# Patient Record
Sex: MALE | Race: WHITE | NOT HISPANIC OR LATINO | Employment: OTHER | RURAL
[De-identification: names, ages, dates, MRNs, and addresses within clinical notes are randomized per-mention and may not be internally consistent; named-entity substitution may affect disease eponyms.]

---

## 2017-02-14 ENCOUNTER — TELEPHONE (OUTPATIENT)
Dept: CARDIOLOGY | Facility: CLINIC | Age: 77
End: 2017-02-14

## 2017-02-14 NOTE — TELEPHONE ENCOUNTER
Patient dr office called in to have a new patient appointment with  in the [place] office. Is it okay to schedule the patient in the hold spot for 3/23? Pt has a new onset of afib,SOA, & Right bbb.    Douglas CASON-PCP/DX:NEW ONSET AFIB;RIGHT BBB;SOA/[facility] PPO/SW CANDY

## 2017-02-23 ENCOUNTER — OFFICE VISIT (OUTPATIENT)
Dept: CARDIOLOGY | Facility: CLINIC | Age: 77
End: 2017-02-23

## 2017-02-23 VITALS
HEART RATE: 101 BPM | WEIGHT: 230 LBS | HEIGHT: 72 IN | SYSTOLIC BLOOD PRESSURE: 170 MMHG | DIASTOLIC BLOOD PRESSURE: 94 MMHG | BODY MASS INDEX: 31.15 KG/M2

## 2017-02-23 DIAGNOSIS — R06.09 DYSPNEA ON EXERTION: ICD-10-CM

## 2017-02-23 DIAGNOSIS — I20.9 ANGINA PECTORIS (HCC): ICD-10-CM

## 2017-02-23 DIAGNOSIS — I48.19 PERSISTENT ATRIAL FIBRILLATION (HCC): Primary | ICD-10-CM

## 2017-02-23 PROCEDURE — 93000 ELECTROCARDIOGRAM COMPLETE: CPT | Performed by: INTERNAL MEDICINE

## 2017-02-23 PROCEDURE — 99204 OFFICE O/P NEW MOD 45 MIN: CPT | Performed by: INTERNAL MEDICINE

## 2017-02-23 NOTE — PROGRESS NOTES
Subjective:     Encounter Date:02/23/2017      Patient ID: Nito Corado is a 76 y.o. male.    Chief Complaint: AF, dyspnea    History of Present Illness     Dear Dr. Be,      I had the pleasure of seeing the patient in cardiac evaluation today.  As you well know, he is a libia 76-year-old man with a history of prior smoking.  He quit in 1987.   He had a knee replacement in the past, but this is the first time he has seen a doctor in many years.    He presented to your office with complaints of dyspnea.   He was found to be in atrial fibrillation with a right bundle branch block.   He states that walking from the parking lot into the office, or just up one flight of stairs is enough to make him out of breath.  He also has a dry nonproductive cough.        Because of these symptoms, he has been sent to me for evaluation.          Review of Systems   All other systems reviewed and are negative.    Family History   Problem Relation Age of Onset   • Tuberculosis Father      Social History   Substance Use Topics   • Smoking status: Never Smoker   • Smokeless tobacco: None   • Alcohol use No           ECG 12 Lead  Date/Time: 2/23/2017 2:52 PM  Performed by: EDUARDO OSHEA  Authorized by: EDUARDO OSHEA   Previous ECG: no previous ECG available  Rhythm: atrial fibrillation  BPM: 101  Conduction: right bundle branch block and LAFB               Objective:     Physical Exam   Constitutional: He is oriented to person, place, and time. He appears well-developed and well-nourished.   HENT:   Head: Normocephalic and atraumatic.   Neck: Normal range of motion. Neck supple.   Cardiovascular: Normal rate and normal heart sounds.  An irregularly irregular rhythm present.   Pulmonary/Chest: Effort normal and breath sounds normal.   Abdominal: Soft. Bowel sounds are normal.   Musculoskeletal: Normal range of motion.   Neurological: He is alert and oriented to person, place, and time.   Skin: Skin is warm and dry.   Psychiatric: He  has a normal mood and affect. His behavior is normal. Thought content normal.   Vitals reviewed.      Lab Review:       Assessment:          Diagnosis Plan   1. Persistent atrial fibrillation     2. Dyspnea on exertion     3. Angina pectoris            Plan:        It was a pleasure to  see the patient in cardiac evaluation today.  He is a libia 76-year-old man with multiple cardiac risk factors.  He has not seen a physician in many years.   He now has atrial fibrillation of unclear duration. He has a conduction abnormality and he has a dry cough with severe dyspnea.       There are many possible causes of his symptoms.  I think the atrial fibrillation is a big clue, but it may not be the only possibility.       I have talked to him at length about anticoagulation for stroke prevention. He is not interested in warfarin due to the need to check his blood on a regular basis.   He will start Eliquis 5 mg twice daily.       He will start on Metoprolol 25 mg twice daily for rate control.  I will see him back in four weeks to see how he is tolerating this treatment.  Once he has been anticoagulated for a sufficient duration of time, I will consider cardioversion at that point.       He has significant symptoms that may represent other underlying heart disease.   He will get an echocardiogram, as well as a Lexiscan Nuclear Stress test to assess this further.  If no other symptoms or cardiac causes of his symptoms can be identified, then he may have to look at a pulmonary evaluation, as well.       Atrial Fibrillation and Atrial Flutter  Assessment  • The patient has atrial fibrillation-initial episode  • This is non-valvular in etiology  • The patient's CHADS2-VASc score is 1  • A [id number]-CZXu score of 1 is considered an intermediate risk for a thromboembolic event    Plan  • Attempt to maintain sinus rhythm  • Add apixaban for antithrombotic therapy  • Add beta blocker for rate control

## 2017-03-02 ENCOUNTER — TELEPHONE (OUTPATIENT)
Dept: CARDIOLOGY | Facility: CLINIC | Age: 77
End: 2017-03-02

## 2017-03-02 NOTE — TELEPHONE ENCOUNTER
Patient calling to get Stress Test & Echo scheduled at [facility].    Dr Ramirez please place order so this can be scheduled.    Thanks!

## 2017-03-03 DIAGNOSIS — I20.9 ANGINA PECTORIS (HCC): Primary | ICD-10-CM

## 2017-03-03 DIAGNOSIS — I48.19 PERSISTENT ATRIAL FIBRILLATION (HCC): ICD-10-CM

## 2017-03-03 DIAGNOSIS — R06.09 DYSPNEA ON EXERTION: ICD-10-CM

## 2017-03-03 NOTE — TELEPHONE ENCOUNTER
These were already ordered on 2/27/17.  [facility] says that I shouldn't order them a second time.

## 2017-03-06 NOTE — TELEPHONE ENCOUNTER
Su,   I scheduled these test last week for [facility]. He was scheduled for 3/3/17 for 8am and 9am.   Thanks,   [place]

## 2017-03-08 ENCOUNTER — TELEPHONE (OUTPATIENT)
Dept: CARDIOLOGY | Facility: CLINIC | Age: 77
End: 2017-03-08

## 2017-03-08 NOTE — TELEPHONE ENCOUNTER
Patient's wife calling for Echo & Stress Test results from [facility].  I have requested them to fax results and will place them in your INBOX for review.    Also, she wanted me to let you know that he is still not feeling well, nauseated and dizzy, and want to know if that could still be side effects from medication.    Please call her at [phone].    Thanks!

## 2017-03-10 NOTE — TELEPHONE ENCOUNTER
Patient's wife calling back for test results ( in your INBOX).  She is concerned that he has been really dizzy lately.    Please call her at [phone].    Thanks!

## 2017-03-23 ENCOUNTER — OFFICE VISIT (OUTPATIENT)
Dept: CARDIOLOGY | Facility: CLINIC | Age: 77
End: 2017-03-23

## 2017-03-23 VITALS
DIASTOLIC BLOOD PRESSURE: 96 MMHG | WEIGHT: 227 LBS | HEIGHT: 72 IN | BODY MASS INDEX: 30.75 KG/M2 | HEART RATE: 79 BPM | SYSTOLIC BLOOD PRESSURE: 158 MMHG

## 2017-03-23 DIAGNOSIS — I48.19 PERSISTENT ATRIAL FIBRILLATION (HCC): Primary | ICD-10-CM

## 2017-03-23 PROCEDURE — 93000 ELECTROCARDIOGRAM COMPLETE: CPT | Performed by: INTERNAL MEDICINE

## 2017-03-23 PROCEDURE — 99213 OFFICE O/P EST LOW 20 MIN: CPT | Performed by: INTERNAL MEDICINE

## 2017-03-23 NOTE — PROGRESS NOTES
Subjective:     Encounter Date:03/23/2017      Patient ID: Nito Corado is a 76 y.o. male.    Chief Complaint: Persistent AF    History of Present Illness     Dear Dr. Be,     I had the pleasure of seeing your patient in cardiac followup today.  As you well know, he is a libia 76-year-old man with history of persistent atrial fibrillation.  This was a new diagnosis for which he was seen one month ago.  I started him on Eliquis which he seems to tolerate well.  He has been rate controlled.  He has significant exertional dyspnea but it is not clear this is related to his atrial fibrillation.      He was in the emergency room for a headache associated with nausea.  His workup there was negative for any significant pathology.  He is concerned about possible side effects to medications.          Review of Systems   All other systems reviewed and are negative.        ECG 12 Lead  Date/Time: 3/23/2017 2:21 PM  Performed by: EDUARDO OSHEA  Authorized by: EDUARDO OSHEA   Comparison: compared with previous ECG   Similar to previous ECG  Rhythm: atrial fibrillation  BPM: 79  Conduction: right bundle branch block  QRS axis: left                 Objective:     Physical Exam   Constitutional: He is oriented to person, place, and time. He appears well-developed and well-nourished.   HENT:   Head: Normocephalic and atraumatic.   Neck: Normal range of motion. Neck supple.   Cardiovascular: Normal rate and normal heart sounds.  An irregularly irregular rhythm present.   Pulmonary/Chest: Effort normal and breath sounds normal.   Abdominal: Soft. Bowel sounds are normal.   Musculoskeletal: Normal range of motion.   Neurological: He is alert and oriented to person, place, and time.   Skin: Skin is warm and dry.   Psychiatric: He has a normal mood and affect. His behavior is normal. Thought content normal.   Vitals reviewed.      Lab Review:       Assessment:          Diagnosis Plan   1. Persistent atrial fibrillation   Cardioversion External in Cardiology Department          Plan:        It was a pleasure to see the patient in cardiac followup today. He is doing the same after anticoagulation for atrial fibrillation. He is fairly limited in that he cannot walk across the room without getting out of breath. On the other hand, he really does not want aggressive therapy as far as atrial fibrillation. I did think 1 attempt at cardioversion would be reasonable to see if we can get him back to a sinus rhythm. We will see if his symptoms improve with this.     I do not think he is interested in intensification of his medical regimen at all. He will schedule his cardioversion at his earliest convenience.     Atrial Fibrillation and Atrial Flutter  Assessment  • The patient has persistent atrial fibrillation  • This is non-valvular in etiology  • The patient's CHADS2-VASc score is 2  • A GVX3FL6-HBOo score of 2 or more is considered a high risk for a thromboembolic event  • Apixaban prescribed    Plan  • Attempt to maintain sinus rhythm  • Continue apixaban for antithrombotic therapy, bleeding issues discussed  • Continue beta blocker for rate control

## 2017-04-06 ENCOUNTER — OUTSIDE FACILITY SERVICE (OUTPATIENT)
Dept: CARDIOLOGY | Facility: CLINIC | Age: 77
End: 2017-04-06

## 2017-04-06 DIAGNOSIS — I48.19 PERSISTENT ATRIAL FIBRILLATION (HCC): ICD-10-CM

## 2017-04-06 PROCEDURE — 92960 CARDIOVERSION ELECTRIC EXT: CPT | Performed by: INTERNAL MEDICINE

## 2017-05-04 ENCOUNTER — OFFICE VISIT (OUTPATIENT)
Dept: CARDIOLOGY | Facility: CLINIC | Age: 77
End: 2017-05-04

## 2017-05-04 VITALS
DIASTOLIC BLOOD PRESSURE: 78 MMHG | BODY MASS INDEX: 30.75 KG/M2 | HEART RATE: 69 BPM | SYSTOLIC BLOOD PRESSURE: 142 MMHG | HEIGHT: 72 IN | WEIGHT: 227 LBS

## 2017-05-04 DIAGNOSIS — I48.0 AF (PAROXYSMAL ATRIAL FIBRILLATION) (HCC): Primary | ICD-10-CM

## 2017-05-04 PROCEDURE — 93000 ELECTROCARDIOGRAM COMPLETE: CPT | Performed by: INTERNAL MEDICINE

## 2017-05-04 PROCEDURE — 99213 OFFICE O/P EST LOW 20 MIN: CPT | Performed by: INTERNAL MEDICINE

## 2017-05-04 RX ORDER — PROPAFENONE HYDROCHLORIDE 300 MG/1
150 TABLET, COATED ORAL EVERY 8 HOURS
Qty: 60 TABLET | Refills: 11 | Status: SHIPPED | OUTPATIENT
Start: 2017-05-04 | End: 2017-06-29 | Stop reason: SDUPTHER

## 2017-05-08 ENCOUNTER — TELEPHONE (OUTPATIENT)
Dept: CARDIOLOGY | Facility: CLINIC | Age: 77
End: 2017-05-08

## 2017-06-02 ENCOUNTER — TELEPHONE (OUTPATIENT)
Dept: CARDIOLOGY | Facility: CLINIC | Age: 77
End: 2017-06-02

## 2017-06-05 ENCOUNTER — TELEPHONE (OUTPATIENT)
Dept: CARDIOLOGY | Facility: CLINIC | Age: 77
End: 2017-06-05

## 2017-06-05 NOTE — TELEPHONE ENCOUNTER
Per Dr. Ramirez, patient should increase propafenone to a full tab TID, and patient will call back to let us know how he is doing.  Possible EKG later this week.  Spoke to patient's wife.

## 2017-06-05 NOTE — TELEPHONE ENCOUNTER
Patient's wife says that patient is short of breath and they feel like he may be back in afib.  They say that he is having difficulty walking very short distances.  They want him to come in and see Dr. Ramirez sooner than the 6/29 appt.  Please call her back and let her know what you would like for them to do .

## 2017-06-15 ENCOUNTER — OFFICE VISIT (OUTPATIENT)
Dept: CARDIOLOGY | Facility: CLINIC | Age: 77
End: 2017-06-15

## 2017-06-15 VITALS
SYSTOLIC BLOOD PRESSURE: 142 MMHG | BODY MASS INDEX: 30.75 KG/M2 | HEART RATE: 78 BPM | WEIGHT: 227 LBS | HEIGHT: 72 IN | DIASTOLIC BLOOD PRESSURE: 76 MMHG

## 2017-06-15 DIAGNOSIS — I48.19 PERSISTENT ATRIAL FIBRILLATION (HCC): Primary | ICD-10-CM

## 2017-06-15 PROCEDURE — 99213 OFFICE O/P EST LOW 20 MIN: CPT | Performed by: INTERNAL MEDICINE

## 2017-06-15 PROCEDURE — 93000 ELECTROCARDIOGRAM COMPLETE: CPT | Performed by: INTERNAL MEDICINE

## 2017-06-15 NOTE — PROGRESS NOTES
Subjective:     Encounter Date:06/15/2017      Patient ID: Nito Corado is a 76 y.o. male.    Chief Complaint: PAF    History of Present Illness     Dear Dr. Be:    I had the pleasure of seeing the patient in cardiac followup today. As you well know, he is a libia 76-year-old man with history of paroxysmal atrial fibrillation. He has been treated with apixaban for stroke prevention, which he has tolerated well. His atrial fibrillation makes him tired. He complains of dyspnea, palpitations and fatigue.    He had a cardioversion in the past, but this was successful in restoring sinus rhythm only for 10 minutes. I then loaded him on propafenone and performed a repeat cardioversion. This time he held in sinus rhythm just for about a day.     Unfortunately, he did not have sinus rhythm long enough to know whether he felt significantly better in sinus rhythm.    In general, he reports his continued complaints of generalized fatigue, dyspnea and palpitations.         Review of Systems   All other systems reviewed and are negative.        ECG 12 Lead  Date/Time: 6/15/2017 4:23 PM  Performed by: EDUARDO OSHEA  Authorized by: EDUARDO OSHEA   Comparison: compared with previous ECG   Similar to previous ECG  Rhythm: atrial fibrillation  BPM: 78  Conduction: right bundle branch block               Objective:     Physical Exam   Constitutional: He is oriented to person, place, and time. He appears well-developed and well-nourished.   HENT:   Head: Normocephalic and atraumatic.   Neck: Normal range of motion. Neck supple.   Cardiovascular: Normal rate and normal heart sounds.  An irregularly irregular rhythm present.   Pulmonary/Chest: Effort normal and breath sounds normal.   Abdominal: Soft. Bowel sounds are normal.   Musculoskeletal: Normal range of motion.   Neurological: He is alert and oriented to person, place, and time.   Skin: Skin is warm and dry.   Psychiatric: He has a normal mood and affect. His behavior is  normal. Thought content normal.   Vitals reviewed.      Lab Review:       Assessment:          Diagnosis Plan   1. Persistent atrial fibrillation            Plan:        It was a pleasure to see the patient in cardiac followup today. He has had 2 cardioversions, one of which occurred on propafenone. He was unable to maintain sinus rhythm for more than a day. He still thinks that it might be worthwhile to do something more aggressive to treat his atrial fibrillation. To this end, I have recommended that he see Dr. Marti for EP evaluation. Further management will depend on the results of this consultation.    Atrial Fibrillation and Atrial Flutter  Assessment  • The patient has persistent atrial fibrillation  • This is non-valvular in etiology  • The patient's CHADS2-VASc score is 1  • A [id number]-JNSz score of 1 is considered an intermediate risk for a thromboembolic event  • Apixaban prescribed    Plan  • Attempt to maintain sinus rhythm  • Continue apixaban for antithrombotic therapy, bleeding issues discussed  • Continue propafenone for rhythm control  • Continue beta blocker for rate control    Subjective - Objective  • The patient underwent cardioversion

## 2017-06-29 RX ORDER — PROPAFENONE HYDROCHLORIDE 300 MG/1
300 TABLET, COATED ORAL EVERY 8 HOURS
Qty: 270 TABLET | Refills: 1 | Status: SHIPPED | OUTPATIENT
Start: 2017-06-29 | End: 2017-07-12

## 2017-07-12 ENCOUNTER — OFFICE VISIT (OUTPATIENT)
Dept: CARDIOLOGY | Facility: CLINIC | Age: 77
End: 2017-07-12

## 2017-07-12 VITALS
SYSTOLIC BLOOD PRESSURE: 172 MMHG | WEIGHT: 226 LBS | DIASTOLIC BLOOD PRESSURE: 102 MMHG | BODY MASS INDEX: 30.61 KG/M2 | HEIGHT: 72 IN | HEART RATE: 78 BPM

## 2017-07-12 DIAGNOSIS — I48.19 PERSISTENT ATRIAL FIBRILLATION (HCC): Primary | ICD-10-CM

## 2017-07-12 DIAGNOSIS — E66.3 OVERWEIGHT ON EXAMINATION: ICD-10-CM

## 2017-07-12 PROCEDURE — 93000 ELECTROCARDIOGRAM COMPLETE: CPT | Performed by: INTERNAL MEDICINE

## 2017-07-12 PROCEDURE — 99203 OFFICE O/P NEW LOW 30 MIN: CPT | Performed by: INTERNAL MEDICINE

## 2017-07-12 NOTE — PROGRESS NOTES
Date of Office Visit: 2017  Encounter Provider: Scooby Marti MD  Place of Service: [facility] CARDIOLOGY  Patient Name: Nito Corado  : 1940    Subjective:     Encounter Date:2017      Patient ID: Nito Corado is a 76 y.o. male who has a cc of  Persistent AF/     Cc is soa, pretty and fatigue and weakness.   Fatigue started two years ago.   This spring he started with soa.   Staggers a little bit.     No sig CAD   No anginal chest pain,   No sig pretty,   No soa,   No fainting,  No orthostasis.   No edema.   Exercise tolerance: pretty poor exercise tolerance.     There have been no hospital admission since the last visit.     There have been no bleeding events.       Past Medical History:   Diagnosis Date   • Allergic rhinitis    • Benign hypertension    • History of bleeding ulcers    • Marginal perforation of tympanic membrane    • Otorrhea    • PAF (paroxysmal atrial fibrillation)        Social History     Social History   • Marital status:      Spouse name: N/A   • Number of children: N/A   • Years of education: N/A     Occupational History   • Not on file.     Social History Main Topics   • Smoking status: Never Smoker   • Smokeless tobacco: Not on file   • Alcohol use No   • Drug use: Not on file   • Sexual activity: Not on file     Other Topics Concern   • Not on file     Social History Narrative       Review of Systems   Constitution: Negative for fever and night sweats.   HENT: Negative for ear pain and stridor.    Eyes: Negative for discharge and visual halos.   Cardiovascular: Negative for cyanosis.   Respiratory: Negative for hemoptysis and sputum production.    Hematologic/Lymphatic: Negative for adenopathy.   Skin: Negative for nail changes and unusual hair distribution.   Musculoskeletal: Negative for gout and joint swelling.   Gastrointestinal: Negative for bowel incontinence and flatus.   Genitourinary: Negative for dysuria and flank pain.  "  Neurological: Negative for seizures and tremors.   Psychiatric/Behavioral: Negative for altered mental status. The patient is not nervous/anxious.             Objective:     Vitals:    07/12/17 0937   BP: (!) 172/102   Pulse: 78   Weight: 226 lb (103 kg)   Height: 72\" (182.9 cm)         Physical Exam   Constitutional: He is oriented to person, place, and time.   HENT:   Head: Normocephalic and atraumatic.   Eyes: Right eye exhibits no discharge. Left eye exhibits no discharge.   Neck: No JVD present. No thyromegaly present.   Cardiovascular: Normal rate.  An irregularly irregular rhythm present. Exam reveals no gallop and no friction rub.    No murmur heard.  Pulmonary/Chest: Effort normal and breath sounds normal. He has no rales.   Abdominal: Soft. Bowel sounds are normal. There is no tenderness.   Musculoskeletal: Normal range of motion. He exhibits no edema or deformity.   Neurological: He is alert and oriented to person, place, and time. He exhibits normal muscle tone.   Skin: Skin is warm and dry. No erythema.   Psychiatric: He has a normal mood and affect. His behavior is normal. Thought content normal.         ECG 12 Lead  Date/Time: 7/12/2017 10:11 AM  Performed by: PENELOPE PABLO  Authorized by: PENELOPE PABLO   Comparison: compared with previous ECG   Similar to previous ECG  Rhythm: atrial fibrillation  Conduction: right bundle branch block  Clinical impression: abnormal ECG            Lab Review:       Assessment:          Diagnosis Plan   1. Persistent atrial fibrillation            Plan:       The CHADSVASC score is 2   Anticoagulation a-ban   Heart rate control bb      For the problem of overweight/obesity, we discussed the importance of lifestyle measures and strategies for weight loss, such as improved nutrition, regular exercise and sleep hygiene.     We should stop propafenone--and probably abandon rhythm control     We should get him a sleep eval -- highly likely that he has it    I told him " he needs to eat less and move more.

## 2017-07-31 ENCOUNTER — TELEPHONE (OUTPATIENT)
Dept: CARDIOLOGY | Facility: CLINIC | Age: 77
End: 2017-07-31

## 2017-08-14 NOTE — TELEPHONE ENCOUNTER
No neeraj not scheduled him to see anyone else. When we spoke about it, I mentioned to you that I was going to message JM and ask him for a referral for the patient to go see another doctor that will prescribe him a medicine, but you said you would ask him. I've not heard anything from you. I'll message him and see if he has any recommendations for the patient, because I don't know of any.    Dr. Marti,   Do you know of any doctors the patient can go to that he can be prescribed sleep meds?  trm

## 2017-08-16 DIAGNOSIS — G47.33 OBSTRUCTIVE SLEEP APNEA SYNDROME: Primary | ICD-10-CM

## 2017-08-16 DIAGNOSIS — I48.91 ATRIAL FIBRILLATION, UNSPECIFIED TYPE (HCC): ICD-10-CM

## 2017-10-17 ENCOUNTER — APPOINTMENT (OUTPATIENT)
Dept: SLEEP MEDICINE | Facility: HOSPITAL | Age: 77
End: 2017-10-17
Attending: INTERNAL MEDICINE

## 2017-11-27 ENCOUNTER — TELEPHONE (OUTPATIENT)
Dept: CARDIOLOGY | Facility: CLINIC | Age: 77
End: 2017-11-27

## 2017-11-27 NOTE — TELEPHONE ENCOUNTER
"Pt's wife called.  She states Mr. Corado has been using a CPAP machine for the past 5 wks and he is still SOA.  He is also in Afib.  They would like an appt with Dr. Marti to see what the \"next step\" would be.  Can you please help with an appt?  Or maybe he can see Marci?  Thanks/[facility]    # [phone]  "

## 2017-12-07 ENCOUNTER — APPOINTMENT (OUTPATIENT)
Dept: GENERAL RADIOLOGY | Facility: HOSPITAL | Age: 77
End: 2017-12-07
Attending: INTERNAL MEDICINE

## 2017-12-07 ENCOUNTER — HOSPITAL ENCOUNTER (OUTPATIENT)
Facility: HOSPITAL | Age: 77
Setting detail: OBSERVATION
Discharge: HOME | End: 2017-12-08
Attending: INTERNAL MEDICINE | Admitting: INTERNAL MEDICINE

## 2017-12-07 PROBLEM — D72.829 ELEVATED WBC COUNT: Status: ACTIVE | Noted: 2017-12-07

## 2017-12-07 PROBLEM — R06.02 SHORTNESS OF BREATH: Status: ACTIVE | Noted: 2017-12-07

## 2017-12-07 PROBLEM — R06.00 DYSPNEA: Status: ACTIVE | Noted: 2017-12-07

## 2017-12-07 PROBLEM — R10.9 ABDOMINAL PAIN: Status: ACTIVE | Noted: 2017-12-07

## 2017-12-07 PROBLEM — I48.91 ATRIAL FIBRILLATION (HCC): Status: ACTIVE | Noted: 2017-07-12

## 2017-12-07 PROBLEM — R11.0 NAUSEA: Status: ACTIVE | Noted: 2017-12-07

## 2017-12-07 LAB
ALBUMIN SERPL-MCNC: 3.6 G/DL (ref 3.5–5.2)
ALBUMIN/GLOB SERPL: 0.8 G/DL
ALP SERPL-CCNC: 59 U/L (ref 39–117)
ALT SERPL W P-5'-P-CCNC: 18 U/L (ref 1–41)
ANION GAP SERPL CALCULATED.3IONS-SCNC: 12.9 MMOL/L
AST SERPL-CCNC: 17 U/L (ref 1–40)
B PERT DNA SPEC QL NAA+PROBE: NOT DETECTED
BACTERIA UR QL AUTO: ABNORMAL /HPF
BASOPHILS # BLD AUTO: 0.01 10*3/MM3 (ref 0–0.2)
BASOPHILS NFR BLD AUTO: 0.1 % (ref 0–1.5)
BILIRUB SERPL-MCNC: 1.1 MG/DL (ref 0.1–1.2)
BILIRUB UR QL STRIP: NEGATIVE
BUN BLD-MCNC: 21 MG/DL (ref 8–23)
BUN/CREAT SERPL: 19.6 (ref 7–25)
C PNEUM DNA NPH QL NAA+NON-PROBE: NOT DETECTED
CALCIUM SPEC-SCNC: 9 MG/DL (ref 8.6–10.5)
CHLORIDE SERPL-SCNC: 101 MMOL/L (ref 98–107)
CLARITY UR: CLEAR
CO2 SERPL-SCNC: 23.1 MMOL/L (ref 22–29)
COLOR UR: ABNORMAL
CREAT BLD-MCNC: 1.07 MG/DL (ref 0.76–1.27)
D-LACTATE SERPL-SCNC: 1.2 MMOL/L (ref 0.5–2)
DEPRECATED RDW RBC AUTO: 45 FL (ref 37–54)
EOSINOPHIL # BLD AUTO: 0.01 10*3/MM3 (ref 0–0.7)
EOSINOPHIL NFR BLD AUTO: 0.1 % (ref 0.3–6.2)
ERYTHROCYTE [DISTWIDTH] IN BLOOD BY AUTOMATED COUNT: 13.2 % (ref 11.5–14.5)
FLUAV H1 2009 PAND RNA NPH QL NAA+PROBE: NOT DETECTED
FLUAV H1 HA GENE NPH QL NAA+PROBE: NOT DETECTED
FLUAV H3 RNA NPH QL NAA+PROBE: NOT DETECTED
FLUAV SUBTYP SPEC NAA+PROBE: NOT DETECTED
FLUBV RNA ISLT QL NAA+PROBE: NOT DETECTED
GFR SERPL CREATININE-BSD FRML MDRD: 67 ML/MIN/1.73
GLOBULIN UR ELPH-MCNC: 4.7 GM/DL
GLUCOSE BLD-MCNC: 137 MG/DL (ref 65–99)
GLUCOSE UR STRIP-MCNC: NEGATIVE MG/DL
HADV DNA SPEC NAA+PROBE: NOT DETECTED
HCOV 229E RNA SPEC QL NAA+PROBE: NOT DETECTED
HCOV HKU1 RNA SPEC QL NAA+PROBE: NOT DETECTED
HCOV NL63 RNA SPEC QL NAA+PROBE: NOT DETECTED
HCOV OC43 RNA SPEC QL NAA+PROBE: NOT DETECTED
HCT VFR BLD AUTO: 44.2 % (ref 40.4–52.2)
HGB BLD-MCNC: 14.4 G/DL (ref 13.7–17.6)
HGB UR QL STRIP.AUTO: ABNORMAL
HMPV RNA NPH QL NAA+NON-PROBE: NOT DETECTED
HPIV1 RNA SPEC QL NAA+PROBE: NOT DETECTED
HPIV2 RNA SPEC QL NAA+PROBE: NOT DETECTED
HPIV3 RNA NPH QL NAA+PROBE: NOT DETECTED
HPIV4 P GENE NPH QL NAA+PROBE: NOT DETECTED
HYALINE CASTS UR QL AUTO: ABNORMAL /LPF
IMM GRANULOCYTES # BLD: 0.05 10*3/MM3 (ref 0–0.03)
IMM GRANULOCYTES NFR BLD: 0.3 % (ref 0–0.5)
KETONES UR QL STRIP: NEGATIVE
LEUKOCYTE ESTERASE UR QL STRIP.AUTO: NEGATIVE
LYMPHOCYTES # BLD AUTO: 0.93 10*3/MM3 (ref 0.9–4.8)
LYMPHOCYTES NFR BLD AUTO: 5 % (ref 19.6–45.3)
M PNEUMO IGG SER IA-ACNC: NOT DETECTED
MCH RBC QN AUTO: 30.8 PG (ref 27–32.7)
MCHC RBC AUTO-ENTMCNC: 32.6 G/DL (ref 32.6–36.4)
MCV RBC AUTO: 94.4 FL (ref 79.8–96.2)
MONOCYTES # BLD AUTO: 1.69 10*3/MM3 (ref 0.2–1.2)
MONOCYTES NFR BLD AUTO: 9.1 % (ref 5–12)
NEUTROPHILS # BLD AUTO: 15.87 10*3/MM3 (ref 1.9–8.1)
NEUTROPHILS NFR BLD AUTO: 85.4 % (ref 42.7–76)
NITRITE UR QL STRIP: NEGATIVE
NT-PROBNP SERPL-MCNC: 2516 PG/ML (ref 0–1800)
PH UR STRIP.AUTO: <=5 [PH] (ref 5–8)
PLATELET # BLD AUTO: 176 10*3/MM3 (ref 140–500)
PMV BLD AUTO: 10.2 FL (ref 6–12)
POTASSIUM BLD-SCNC: 4.2 MMOL/L (ref 3.5–5.2)
PROCALCITONIN SERPL-MCNC: 0.21 NG/ML (ref 0.1–0.25)
PROT SERPL-MCNC: 8.3 G/DL (ref 6–8.5)
PROT UR QL STRIP: ABNORMAL
RBC # BLD AUTO: 4.68 10*6/MM3 (ref 4.6–6)
RBC # UR: ABNORMAL /HPF
REF LAB TEST METHOD: ABNORMAL
RHINOVIRUS RNA SPEC NAA+PROBE: NOT DETECTED
RSV RNA NPH QL NAA+NON-PROBE: NOT DETECTED
SODIUM BLD-SCNC: 137 MMOL/L (ref 136–145)
SP GR UR STRIP: >=1.03 (ref 1–1.03)
SQUAMOUS #/AREA URNS HPF: ABNORMAL /HPF
TROPONIN T SERPL-MCNC: <0.01 NG/ML (ref 0–0.03)
UROBILINOGEN UR QL STRIP: ABNORMAL
WBC NRBC COR # BLD: 18.56 10*3/MM3 (ref 4.5–10.7)
WBC UR QL AUTO: ABNORMAL /HPF

## 2017-12-07 PROCEDURE — 87633 RESP VIRUS 12-25 TARGETS: CPT | Performed by: HOSPITALIST

## 2017-12-07 PROCEDURE — G0378 HOSPITAL OBSERVATION PER HR: HCPCS

## 2017-12-07 PROCEDURE — 81001 URINALYSIS AUTO W/SCOPE: CPT | Performed by: HOSPITALIST

## 2017-12-07 PROCEDURE — 87798 DETECT AGENT NOS DNA AMP: CPT | Performed by: HOSPITALIST

## 2017-12-07 PROCEDURE — 71020 HC CHEST PA AND LATERAL: CPT

## 2017-12-07 PROCEDURE — 83880 ASSAY OF NATRIURETIC PEPTIDE: CPT | Performed by: NURSE PRACTITIONER

## 2017-12-07 PROCEDURE — 93010 ELECTROCARDIOGRAM REPORT: CPT | Performed by: INTERNAL MEDICINE

## 2017-12-07 PROCEDURE — 80053 COMPREHEN METABOLIC PANEL: CPT | Performed by: INTERNAL MEDICINE

## 2017-12-07 PROCEDURE — 87486 CHLMYD PNEUM DNA AMP PROBE: CPT | Performed by: HOSPITALIST

## 2017-12-07 PROCEDURE — G0379 DIRECT REFER HOSPITAL OBSERV: HCPCS

## 2017-12-07 PROCEDURE — 96374 THER/PROPH/DIAG INJ IV PUSH: CPT

## 2017-12-07 PROCEDURE — 25010000002 ONDANSETRON PER 1 MG: Performed by: NURSE PRACTITIONER

## 2017-12-07 PROCEDURE — 93005 ELECTROCARDIOGRAM TRACING: CPT | Performed by: INTERNAL MEDICINE

## 2017-12-07 PROCEDURE — 96375 TX/PRO/DX INJ NEW DRUG ADDON: CPT

## 2017-12-07 PROCEDURE — 87581 M.PNEUMON DNA AMP PROBE: CPT | Performed by: HOSPITALIST

## 2017-12-07 PROCEDURE — 99219 PR INITIAL OBSERVATION CARE/DAY 50 MINUTES: CPT | Performed by: INTERNAL MEDICINE

## 2017-12-07 PROCEDURE — 25010000002 FUROSEMIDE PER 20 MG: Performed by: INTERNAL MEDICINE

## 2017-12-07 PROCEDURE — 83605 ASSAY OF LACTIC ACID: CPT | Performed by: HOSPITALIST

## 2017-12-07 PROCEDURE — 84145 PROCALCITONIN (PCT): CPT | Performed by: HOSPITALIST

## 2017-12-07 PROCEDURE — 84484 ASSAY OF TROPONIN QUANT: CPT | Performed by: INTERNAL MEDICINE

## 2017-12-07 PROCEDURE — 85025 COMPLETE CBC W/AUTO DIFF WBC: CPT | Performed by: INTERNAL MEDICINE

## 2017-12-07 RX ORDER — FUROSEMIDE 10 MG/ML
60 INJECTION INTRAMUSCULAR; INTRAVENOUS ONCE
Status: COMPLETED | OUTPATIENT
Start: 2017-12-07 | End: 2017-12-07

## 2017-12-07 RX ORDER — POTASSIUM CHLORIDE 750 MG/1
40 CAPSULE, EXTENDED RELEASE ORAL ONCE
Status: COMPLETED | OUTPATIENT
Start: 2017-12-07 | End: 2017-12-07

## 2017-12-07 RX ORDER — ONDANSETRON 2 MG/ML
4 INJECTION INTRAMUSCULAR; INTRAVENOUS ONCE
Status: COMPLETED | OUTPATIENT
Start: 2017-12-07 | End: 2017-12-07

## 2017-12-07 RX ADMIN — FUROSEMIDE 60 MG: 10 INJECTION, SOLUTION INTRAMUSCULAR; INTRAVENOUS at 17:21

## 2017-12-07 RX ADMIN — METOPROLOL TARTRATE 25 MG: 25 TABLET ORAL at 20:39

## 2017-12-07 RX ADMIN — POTASSIUM CHLORIDE 40 MEQ: 750 CAPSULE, EXTENDED RELEASE ORAL at 17:21

## 2017-12-07 RX ADMIN — APIXABAN 5 MG: 2.5 TABLET, FILM COATED ORAL at 17:21

## 2017-12-07 RX ADMIN — METOPROLOL TARTRATE 25 MG: 25 TABLET ORAL at 12:17

## 2017-12-07 RX ADMIN — APIXABAN 5 MG: 2.5 TABLET, FILM COATED ORAL at 12:17

## 2017-12-07 RX ADMIN — ONDANSETRON 4 MG: 2 INJECTION INTRAMUSCULAR; INTRAVENOUS at 09:30

## 2017-12-07 NOTE — H&P
Electrophysiology History & Physical    Date of Hospital Visit: 2017  5:08 AM  Encounter Provider: Scooby Marti MD  Place of Service: [facility]  Patient Name: Nito Corado  :1940      Chief complaint: shortness of breath     [id number] Score: 2      History of Present Illness: Mr. Corado is a 77 year old patient of [facility] and Dr. Lee Ramirez that has a documented history of Paroxysmal  Afib (on apixaban) s/p DCCV, sleep apnea (on CPAP), HTN and obesity. His last stress test was in  at [facility] that showed small to moderate sized area of reversible ischemia involving the apex and inferior wall at the apex. His last echo was in  at [facility] with an EF of 50-55 % moderately dilated LA, moderate AR and mild TR.     He had last seen me in the office in July at which time he reported that he was short of breath and fatigued. He denied any chest pain, palpitations or syncope. At the time of this appointment he was in atrial fibrillation with a VR of 78 and /102. It was decided to stop his propafenone and abandon rhythm control.      He presented to [facility] ED with the complaint of cough, shortness of breath and palpitations that started a day or 2 ago but was worse yesterday morning. He of course was in afib but HR was elevated, 130.-150. This was accompanied with SOA, chest tightness that both became worse with exertion. He reports that he has had a cold over the last few days and has been feeling bloated and nauseated.  Chest x-ray was performed that showed mild vascular prominence without infiltrate. He was given 20 mg of IV Lasix and started on Cardizem drip. He has an elevated white count and received one dose of Rocephin. He is now off the Cardizem gtt and feel nauseated.         Past Medical History:   Diagnosis Date   • Allergic rhinitis    • Atrial fibrillation    • Benign hypertension    • History of bleeding ulcers    • Marginal perforation of  tympanic membrane    • Otorrhea    • PAF (paroxysmal atrial fibrillation)          Past Surgical History:   Procedure Laterality Date   • KNEE SURGERY         Family History   Problem Relation Age of Onset   • Tuberculosis Father        Prescriptions Prior to Admission   Medication Sig Dispense Refill Last Dose   • apixaban (ELIQUIS) 5 MG tablet tablet Take 1 tablet by mouth 2 (Two) Times a Day. 60 tablet 11 Taking   • metoprolol tartrate (LOPRESSOR) 25 MG tablet Take 1 tablet by mouth 2 (Two) Times a Day. 60 tablet 11 Taking   • triamcinolone (KENALOG) 0.1 % ointment Apply  topically 1 (One) Time As Needed.   Taking       Current Meds  No current facility-administered medications on file prior to encounter.      Current Outpatient Prescriptions on File Prior to Encounter   Medication Sig Dispense Refill   • apixaban (ELIQUIS) 5 MG tablet tablet Take 1 tablet by mouth 2 (Two) Times a Day. 60 tablet 11   • metoprolol tartrate (LOPRESSOR) 25 MG tablet Take 1 tablet by mouth 2 (Two) Times a Day. 60 tablet 11   • triamcinolone (KENALOG) 0.1 % ointment Apply  topically 1 (One) Time As Needed.           Social History     Social History   • Marital status:      Spouse name: N/A   • Number of children: N/A   • Years of education: N/A     Occupational History   • Not on file.     Social History Main Topics   • Smoking status: Former Smoker     Quit date: 12/7/1970   • Smokeless tobacco: Former User   • Alcohol use 0.6 oz/week     1 Shots of liquor per week   • Drug use: No   • Sexual activity: Defer     Other Topics Concern   • Not on file     Social History Narrative         REVIEW OF SYSTEMS: All systems reviewed. Pertinent positives identified in HPI. All other systems are negative.     12 point ROS was performed and is negative except as outlined in HPI           Objective:     Vitals:    12/07/17 0522 12/07/17 0552 12/07/17 0722 12/07/17 1136   BP: (!) 182/88 157/85 159/88 137/80   BP Location: Left arm  Left  "arm Left arm   Patient Position: Lying  Lying Lying   Pulse: 115 96 95 92   Resp: 21      Temp: 97.4 °F (36.3 °C)  98 °F (36.7 °C) 97.6 °F (36.4 °C)   TempSrc: Oral  Oral Oral   SpO2: 94% 94%  90%   Weight: 102 kg (224 lb 3.2 oz)      Height: 182.9 cm (72\")        Body mass index is 30.41 kg/(m^2).  Flowsheet Rows         First Filed Value    Admission Height  182.9 cm (72\") Documented at 12/07/2017 0522    Admission Weight  102 kg (224 lb 3.2 oz) Documented at 12/07/2017 0522                           Physical Exam:   Physical Exam   Constitutional: He is oriented to person, place, and time. He appears well-developed and well-nourished. No distress.   HENT:   Head: Normocephalic and atraumatic.   Eyes: Conjunctivae are normal. No scleral icterus.   Neck: Neck supple.   Cardiovascular: Normal rate.  An irregularly irregular rhythm present.   Irregularly, irregular   Pulmonary/Chest: Effort normal. No respiratory distress. He has rhonchi.   Abdominal: Soft.   Musculoskeletal: Normal range of motion. He exhibits no edema.   Neurological: He is alert and oriented to person, place, and time.   Skin: Skin is warm and dry.   Psychiatric: He has a normal mood and affect.                                                            Lab Review:        Results from last 7 days  Lab Units 12/07/17  0959   SODIUM mmol/L 137   POTASSIUM mmol/L 4.2   CHLORIDE mmol/L 101   CO2 mmol/L 23.1   BUN mg/dL 21   CREATININE mg/dL 1.07   GLUCOSE mg/dL 137*   CALCIUM mg/dL 9.0   AST (SGOT) U/L 17   ALT (SGPT) U/L 18    KDH:  WBC 21.1, hemoglobin 15, hematocrit 47, platelets 233  , K4.4, BUN 21, creatinine 1.10  Troponin 0.02, proBNP 3220    Results from last 7 days  Lab Units 12/07/17  0937   TROPONIN T ng/mL <0.010       Results from last 7 days  Lab Units 12/07/17  0937   WBC 10*3/mm3 18.56*   HEMOGLOBIN g/dL 14.4   HEMATOCRIT % 44.2   PLATELETS 10*3/mm3 176                                         Imaging:     Imaging Results (most " recent)     None            EKG:       I personally viewed and interpreted the patient's EKG/Telemetry data    Assessment:    Principal Problem:    Elevated WBC count  Active Problems:    Atrial fibrillation    Abdominal pain    Nausea    Dyspnea    Shortness of breath       Plan:       This is a patient of Dr. Ramirez.   I saw one time for consult for AF  See my note. I rec a trial of rate-control. Not a good candidate for rhythm control as he had not yet had MAXINE eval--among other reasons.   He then had an abrupt onset of weakness and cough and generalized weakness.,   He was transferred here from [place] and unbeknownst to me was admitted to me. Not sure why.   On exam today he has controlled af, no jvd, no edema and b/l rhonchi --   His wbc count is high and his symptoms are c/w with an infectious etiology.     His trop was negative. BNP was slightly high.     We will ask [facility] to admit, we will recheck a CXR, and bnp and repeat an echo.     He could have a mixed diastolic HF picture (previous echo shows normal LV function; nuclear also normal)           Scooby Marti MD  [facility]  12/07/17  1:35 PM

## 2017-12-07 NOTE — PLAN OF CARE
Problem: Patient Care Overview (Adult)  Goal: Plan of Care Review  Outcome: Ongoing (interventions implemented as appropriate)    12/07/17 0026   Coping/Psychosocial Response Interventions   Plan Of Care Reviewed With patient   Patient Care Overview   Progress progress towards functional goals is fair   Outcome Evaluation   Outcome Summary/Follow up Plan PT remains in Afib HR 90's. VSS. C/O nausea this am, tx with Zofran with positive results. [facility] consulted for cold/flu s/s. RT viral and flu swabs negative. UA sent. Had CXR. Got 1X dose of Lasix and KCL. WBC's remain elevated. Will continue to monitor.        Goal: Adult Individualization and Mutuality  Outcome: Ongoing (interventions implemented as appropriate)  Goal: Discharge Needs Assessment  Outcome: Ongoing (interventions implemented as appropriate)    Problem: Arrhythmia/Dysrhythmia (Symptomatic) (Adult)  Goal: Signs and Symptoms of Listed Potential Problems Will be Absent or Manageable (Arrhythmia/Dysrhythmia)  Outcome: Ongoing (interventions implemented as appropriate)    Problem: Pain, Acute (Adult)  Goal: Identify Related Risk Factors and Signs and Symptoms  Outcome: Ongoing (interventions implemented as appropriate)  Goal: Acceptable Pain Control/Comfort Level  Outcome: Ongoing (interventions implemented as appropriate)

## 2017-12-07 NOTE — CONSULTS
Consults    Patient Care Team:  No Known Provider as PCP - General    Chief complaint:soa    Subjective     History of Present Illness      Mr. Corado is a 77 year old patient who is a patient of Dr. Lee Ramirez and Dr Marti that has a history of Paroxysmal  Afib (on apixaban), sleep apnea (on CPAP), HTN and obesity. His last stress test was in 2015 at [facility] that showed small to moderate sized area of reversible ischemia involving the apex and inferior wall at the apex. Her last echo was in 2015 at [facility] with an EF of 50-55 %.        The patient presented to [facility] ED with the complaint of palpitations and was found to be in atrial fibrillation with a VR of 130-150. This was accompanied with SOA, chest tightness that both became worse with exertion. The patient was started on diltiazem for rate control and was transferred to [facility] for further eval.  Here at [facility] the patient's heart rate is better and he is off cardizem.    The patient also reports that he has had a cold over the last few days and has been feeling bloated and nauseated.   Chest x-ray was performed that showed mild vascular prominence without infiltrate, the patient was foud to have an elevated WBCof 21K.  He received one dose of Rocephin at the outside hospital. The patient this am continue to have nausea. The patient denies any abd pain.               Review of Systems   Constitutional: Negative for activity change and appetite change.   HENT: Positive for rhinorrhea. Negative for dental problem.    Respiratory: Negative for apnea and shortness of breath.    Cardiovascular: Negative for chest pain and palpitations.   Gastrointestinal: Positive for nausea. Negative for abdominal distention and abdominal pain.   Endocrine: Negative for cold intolerance and polydipsia.   Genitourinary: Negative for difficulty urinating and dysuria.   Musculoskeletal: Negative for arthralgias.   Skin: Negative for color change and pallor.   Neurological:  Negative for dizziness and numbness.   Hematological: Negative for adenopathy.   Psychiatric/Behavioral: Negative for agitation and behavioral problems.        Past Medical History:   Diagnosis Date   • Allergic rhinitis    • Atrial fibrillation    • Benign hypertension    • History of bleeding ulcers    • Marginal perforation of tympanic membrane    • Otorrhea    • PAF (paroxysmal atrial fibrillation)    ,   Past Surgical History:   Procedure Laterality Date   • KNEE SURGERY     ,   Family History   Problem Relation Age of Onset   • Tuberculosis Father    ,   Social History   Substance Use Topics   • Smoking status: Former Smoker     Quit date: 12/7/1970   • Smokeless tobacco: Former User   • Alcohol use 0.6 oz/week     1 Shots of liquor per week   ,   Prescriptions Prior to Admission   Medication Sig Dispense Refill Last Dose   • apixaban (ELIQUIS) 5 MG tablet tablet Take 1 tablet by mouth 2 (Two) Times a Day. 60 tablet 11 Taking   • metoprolol tartrate (LOPRESSOR) 25 MG tablet Take 1 tablet by mouth 2 (Two) Times a Day. 60 tablet 11 Taking   • triamcinolone (KENALOG) 0.1 % ointment Apply  topically 1 (One) Time As Needed.   Taking   , Scheduled Meds:    apixaban 5 mg Oral BID   metoprolol tartrate 25 mg Oral BID   ondansetron 4 mg Intravenous Once   , Continuous Infusions:   , PRN Meds:   and Allergies:  Review of patient's allergies indicates no known allergies.    Objective      Vital Signs  Temp:  [97.4 °F (36.3 °C)-98 °F (36.7 °C)] 98 °F (36.7 °C)  Heart Rate:  [] 95  Resp:  [21] 21  BP: (157-188)/(85-88) 159/88    Physical Exam   Constitutional: He appears well-developed and well-nourished. No distress.   HENT:   Head: Normocephalic and atraumatic.   Nose: Nose normal.   Mouth/Throat: Oropharynx is clear and moist. No oropharyngeal exudate.   Eyes: Conjunctivae and EOM are normal. No scleral icterus.   Neck: No JVD present. No tracheal deviation present. No thyromegaly present.   Cardiovascular: Normal  rate and normal heart sounds.  Exam reveals no gallop and no friction rub.    No murmur heard.  irregular   Pulmonary/Chest: Effort normal and breath sounds normal. No stridor. No respiratory distress. He has no wheezes. He has no rales.   Abdominal: Soft. Bowel sounds are normal. He exhibits no distension and no mass. Tenderness:  minimal. There is no rebound and no guarding.   abd is soft and non tender with particular attention to the RUQ   Musculoskeletal: He exhibits no edema, tenderness or deformity.   Lymphadenopathy:     He has no cervical adenopathy.   Neurological: He is alert. No cranial nerve deficit.   Skin: Skin is warm and dry. No rash noted. He is not diaphoretic.   Psychiatric: He has a normal mood and affect. His behavior is normal.       Results Review:    I reviewed the patient's new clinical results.  I personally viewed and interpreted the patient's EKG/Telemetry data      Assessment/Plan     Principal Problem:    Elevated WBC count  Active Problems:    Abdominal pain    Atrial fibrillation    Nausea    Dyspnea      Assessment & Plan      Elevated WBC count- check u/a, repeat cbc and cmp      Atrial fibrillation- the patient came in on cardizem but is now off of it      Nausea- better, he denies any abdominal pain- cmp to check on the LFT    Likely upper resp infection      Dyspnea- is resolved since the improvement of the HR    Discussed with Nerissa Perez    Addendum:  Labs reviewed 12/7 pm and unremarkable, wbc is still elevated    I discussed the patients findings and my recommendations with patient and family    Stewart Vizcarra MD  12/07/17  9:43 AM    Time:

## 2017-12-07 NOTE — PLAN OF CARE
Problem: Patient Care Overview (Adult)  Goal: Plan of Care Review  Outcome: Ongoing (interventions implemented as appropriate)    12/07/17 0550   Coping/Psychosocial Response Interventions   Plan Of Care Reviewed With patient   Patient Care Overview   Progress progress towards functional goals is fair   Outcome Evaluation   Outcome Summary/Follow up Plan pt is a direct admit from [facility] for a fib will keep npo till seen vitals are stable admission assessment done awaiting md to see pt this am

## 2017-12-08 ENCOUNTER — APPOINTMENT (OUTPATIENT)
Dept: CARDIOLOGY | Facility: HOSPITAL | Age: 77
End: 2017-12-08
Attending: INTERNAL MEDICINE

## 2017-12-08 VITALS
WEIGHT: 224.2 LBS | SYSTOLIC BLOOD PRESSURE: 125 MMHG | TEMPERATURE: 97.5 F | OXYGEN SATURATION: 95 % | BODY MASS INDEX: 30.37 KG/M2 | HEIGHT: 72 IN | HEART RATE: 87 BPM | RESPIRATION RATE: 18 BRPM | DIASTOLIC BLOOD PRESSURE: 90 MMHG

## 2017-12-08 LAB
ALBUMIN SERPL-MCNC: 3.8 G/DL (ref 3.5–5.2)
ALBUMIN/GLOB SERPL: 0.8 G/DL
ALP SERPL-CCNC: 64 U/L (ref 39–117)
ALT SERPL W P-5'-P-CCNC: 13 U/L (ref 1–41)
ANION GAP SERPL CALCULATED.3IONS-SCNC: 12.7 MMOL/L
AST SERPL-CCNC: 13 U/L (ref 1–40)
BASOPHILS # BLD AUTO: 0.02 10*3/MM3 (ref 0–0.2)
BASOPHILS NFR BLD AUTO: 0.2 % (ref 0–1.5)
BH CV ECHO MEAS - ACS: 1.8 CM
BH CV ECHO MEAS - AI DEC SLOPE: 304 CM/SEC^2
BH CV ECHO MEAS - AI MAX PG: 75 MMHG
BH CV ECHO MEAS - AI MAX VEL: 433 CM/SEC
BH CV ECHO MEAS - AI P1/2T: 417.2 MSEC
BH CV ECHO MEAS - AO MEAN PG (FULL): 2 MMHG
BH CV ECHO MEAS - AO MEAN PG: 5 MMHG
BH CV ECHO MEAS - AO ROOT AREA (BSA CORRECTED): 1.3
BH CV ECHO MEAS - AO ROOT AREA: 6.6 CM^2
BH CV ECHO MEAS - AO ROOT DIAM: 2.9 CM
BH CV ECHO MEAS - AO V2 MAX: 149 CM/SEC
BH CV ECHO MEAS - AO V2 MEAN: 96 CM/SEC
BH CV ECHO MEAS - AO V2 VTI: 25.3 CM
BH CV ECHO MEAS - ASC AORTA: 3 CM
BH CV ECHO MEAS - AVA(I,A): 2.8 CM^2
BH CV ECHO MEAS - AVA(I,D): 2.8 CM^2
BH CV ECHO MEAS - BSA(HAYCOCK): 2.3 M^2
BH CV ECHO MEAS - BSA: 2.2 M^2
BH CV ECHO MEAS - BZI_BMI: 30.4 KILOGRAMS/M^2
BH CV ECHO MEAS - BZI_METRIC_HEIGHT: 182.9 CM
BH CV ECHO MEAS - BZI_METRIC_WEIGHT: 101.6 KG
BH CV ECHO MEAS - CONTRAST EF (2CH): 62.6 ML/M^2
BH CV ECHO MEAS - CONTRAST EF 4CH: 59.3 ML/M^2
BH CV ECHO MEAS - EDV(CUBED): 91.1 ML
BH CV ECHO MEAS - EDV(MOD-SP2): 123 ML
BH CV ECHO MEAS - EDV(MOD-SP4): 123 ML
BH CV ECHO MEAS - EDV(TEICH): 92.4 ML
BH CV ECHO MEAS - EF(CUBED): 75.9 %
BH CV ECHO MEAS - EF(MOD-SP2): 62.6 %
BH CV ECHO MEAS - EF(MOD-SP4): 59.3 %
BH CV ECHO MEAS - EF(TEICH): 68 %
BH CV ECHO MEAS - ESV(CUBED): 22 ML
BH CV ECHO MEAS - ESV(MOD-SP2): 46 ML
BH CV ECHO MEAS - ESV(MOD-SP4): 50 ML
BH CV ECHO MEAS - ESV(TEICH): 29.6 ML
BH CV ECHO MEAS - FS: 37.8 %
BH CV ECHO MEAS - IVS/LVPW: 1
BH CV ECHO MEAS - IVSD: 1.2 CM
BH CV ECHO MEAS - LAT PEAK E' VEL: 8 CM/SEC
BH CV ECHO MEAS - LV DIASTOLIC VOL/BSA (35-75): 55 ML/M^2
BH CV ECHO MEAS - LV MASS(C)D: 198.1 GRAMS
BH CV ECHO MEAS - LV MASS(C)DI: 88.6 GRAMS/M^2
BH CV ECHO MEAS - LV MEAN PG: 3 MMHG
BH CV ECHO MEAS - LV SYSTOLIC VOL/BSA (12-30): 22.4 ML/M^2
BH CV ECHO MEAS - LV V1 MAX: 130 CM/SEC
BH CV ECHO MEAS - LV V1 MEAN: 83.9 CM/SEC
BH CV ECHO MEAS - LV V1 VTI: 22.7 CM
BH CV ECHO MEAS - LVIDD: 4.5 CM
BH CV ECHO MEAS - LVIDS: 2.8 CM
BH CV ECHO MEAS - LVLD AP2: 7.4 CM
BH CV ECHO MEAS - LVLD AP4: 7.7 CM
BH CV ECHO MEAS - LVLS AP2: 6.2 CM
BH CV ECHO MEAS - LVLS AP4: 6.5 CM
BH CV ECHO MEAS - LVOT AREA (M): 3.1 CM^2
BH CV ECHO MEAS - LVOT AREA: 3.1 CM^2
BH CV ECHO MEAS - LVOT DIAM: 2 CM
BH CV ECHO MEAS - LVPWD: 1.2 CM
BH CV ECHO MEAS - MED PEAK E' VEL: 7 CM/SEC
BH CV ECHO MEAS - MV DEC SLOPE: 419 CM/SEC^2
BH CV ECHO MEAS - MV DEC TIME: 0.14 SEC
BH CV ECHO MEAS - MV E MAX VEL: 97.1 CM/SEC
BH CV ECHO MEAS - MV MEAN PG: 3 MMHG
BH CV ECHO MEAS - MV P1/2T MAX VEL: 113 CM/SEC
BH CV ECHO MEAS - MV P1/2T: 79 MSEC
BH CV ECHO MEAS - MV V2 MEAN: 75.2 CM/SEC
BH CV ECHO MEAS - MV V2 VTI: 30.4 CM
BH CV ECHO MEAS - MVA P1/2T LCG: 1.9 CM^2
BH CV ECHO MEAS - MVA(P1/2T): 2.8 CM^2
BH CV ECHO MEAS - MVA(VTI): 2.3 CM^2
BH CV ECHO MEAS - PA ACC SLOPE: 0 CM/SEC^2
BH CV ECHO MEAS - PA ACC TIME: 0.11 SEC
BH CV ECHO MEAS - PA MAX PG: 4.5 MMHG
BH CV ECHO MEAS - PA PR(ACCEL): 31.3 MMHG
BH CV ECHO MEAS - PA V2 MAX: 106 CM/SEC
BH CV ECHO MEAS - QP/QS: 1
BH CV ECHO MEAS - RAP SYSTOLE: 3 MMHG
BH CV ECHO MEAS - RV MEAN PG: 2 MMHG
BH CV ECHO MEAS - RV V1 MEAN: 64.7 CM/SEC
BH CV ECHO MEAS - RV V1 VTI: 17.6 CM
BH CV ECHO MEAS - RVOT AREA: 4.2 CM^2
BH CV ECHO MEAS - RVOT DIAM: 2.3 CM
BH CV ECHO MEAS - RVSP: 18.8 MMHG
BH CV ECHO MEAS - SI(AO): 74.8 ML/M^2
BH CV ECHO MEAS - SI(CUBED): 30.9 ML/M^2
BH CV ECHO MEAS - SI(LVOT): 31.9 ML/M^2
BH CV ECHO MEAS - SI(MOD-SP2): 34.4 ML/M^2
BH CV ECHO MEAS - SI(MOD-SP4): 32.7 ML/M^2
BH CV ECHO MEAS - SI(TEICH): 28.1 ML/M^2
BH CV ECHO MEAS - SV(AO): 167.1 ML
BH CV ECHO MEAS - SV(CUBED): 69.2 ML
BH CV ECHO MEAS - SV(LVOT): 71.3 ML
BH CV ECHO MEAS - SV(MOD-SP2): 77 ML
BH CV ECHO MEAS - SV(MOD-SP4): 73 ML
BH CV ECHO MEAS - SV(RVOT): 73.1 ML
BH CV ECHO MEAS - SV(TEICH): 62.9 ML
BH CV ECHO MEAS - TAPSE (>1.6): 3.4 CM2
BH CV ECHO MEAS - TR MAX VEL: 199 CM/SEC
BH CV VAS BP RIGHT ARM: NORMAL MMHG
BH CV XLRA - RV BASE: 3.4 CM
BH CV XLRA - TDI S': 13 CM/SEC
BILIRUB SERPL-MCNC: 1 MG/DL (ref 0.1–1.2)
BUN BLD-MCNC: 33 MG/DL (ref 8–23)
BUN/CREAT SERPL: 23.6 (ref 7–25)
CALCIUM SPEC-SCNC: 9.1 MG/DL (ref 8.6–10.5)
CHLORIDE SERPL-SCNC: 101 MMOL/L (ref 98–107)
CO2 SERPL-SCNC: 23.3 MMOL/L (ref 22–29)
CREAT BLD-MCNC: 1.4 MG/DL (ref 0.76–1.27)
DEPRECATED RDW RBC AUTO: 45.1 FL (ref 37–54)
E/E' RATIO: 13
EOSINOPHIL # BLD AUTO: 0.13 10*3/MM3 (ref 0–0.7)
EOSINOPHIL NFR BLD AUTO: 1.1 % (ref 0.3–6.2)
ERYTHROCYTE [DISTWIDTH] IN BLOOD BY AUTOMATED COUNT: 13 % (ref 11.5–14.5)
GFR SERPL CREATININE-BSD FRML MDRD: 49 ML/MIN/1.73
GLOBULIN UR ELPH-MCNC: 4.9 GM/DL
GLUCOSE BLD-MCNC: 140 MG/DL (ref 65–99)
HCT VFR BLD AUTO: 46.2 % (ref 40.4–52.2)
HGB BLD-MCNC: 14.9 G/DL (ref 13.7–17.6)
IMM GRANULOCYTES # BLD: 0.03 10*3/MM3 (ref 0–0.03)
IMM GRANULOCYTES NFR BLD: 0.3 % (ref 0–0.5)
LEFT ATRIUM VOLUME INDEX: 33 ML/M2
LV EF 2D ECHO EST: 59 %
LYMPHOCYTES # BLD AUTO: 1.24 10*3/MM3 (ref 0.9–4.8)
LYMPHOCYTES NFR BLD AUTO: 10.4 % (ref 19.6–45.3)
MAXIMAL PREDICTED HEART RATE: 145 BPM
MCH RBC QN AUTO: 30.5 PG (ref 27–32.7)
MCHC RBC AUTO-ENTMCNC: 32.3 G/DL (ref 32.6–36.4)
MCV RBC AUTO: 94.5 FL (ref 79.8–96.2)
MONOCYTES # BLD AUTO: 1.16 10*3/MM3 (ref 0.2–1.2)
MONOCYTES NFR BLD AUTO: 9.8 % (ref 5–12)
NEUTROPHILS # BLD AUTO: 9.3 10*3/MM3 (ref 1.9–8.1)
NEUTROPHILS NFR BLD AUTO: 78.2 % (ref 42.7–76)
NRBC BLD MANUAL-RTO: 0 /100 WBC (ref 0–0)
PLATELET # BLD AUTO: 197 10*3/MM3 (ref 140–500)
PMV BLD AUTO: 10.8 FL (ref 6–12)
POTASSIUM BLD-SCNC: 3.9 MMOL/L (ref 3.5–5.2)
PROCALCITONIN SERPL-MCNC: 0.25 NG/ML (ref 0.1–0.25)
PROT SERPL-MCNC: 8.7 G/DL (ref 6–8.5)
RBC # BLD AUTO: 4.89 10*6/MM3 (ref 4.6–6)
SODIUM BLD-SCNC: 137 MMOL/L (ref 136–145)
STRESS TARGET HR: 123 BPM
WBC NRBC COR # BLD: 11.88 10*3/MM3 (ref 4.5–10.7)

## 2017-12-08 PROCEDURE — 93306 TTE W/DOPPLER COMPLETE: CPT | Performed by: INTERNAL MEDICINE

## 2017-12-08 PROCEDURE — 80053 COMPREHEN METABOLIC PANEL: CPT | Performed by: INTERNAL MEDICINE

## 2017-12-08 PROCEDURE — 25010000002 PERFLUTREN (DEFINITY) 8.476 MG IN SODIUM CHLORIDE 0.9 % 10 ML INJECTION: Performed by: INTERNAL MEDICINE

## 2017-12-08 PROCEDURE — 84145 PROCALCITONIN (PCT): CPT | Performed by: HOSPITALIST

## 2017-12-08 PROCEDURE — G0378 HOSPITAL OBSERVATION PER HR: HCPCS

## 2017-12-08 PROCEDURE — 85025 COMPLETE CBC W/AUTO DIFF WBC: CPT | Performed by: HOSPITALIST

## 2017-12-08 PROCEDURE — 99217 PR OBSERVATION CARE DISCHARGE MANAGEMENT: CPT | Performed by: INTERNAL MEDICINE

## 2017-12-08 PROCEDURE — 93306 TTE W/DOPPLER COMPLETE: CPT

## 2017-12-08 RX ADMIN — APIXABAN 5 MG: 2.5 TABLET, FILM COATED ORAL at 08:17

## 2017-12-08 RX ADMIN — PERFLUTREN 3 ML: 6.52 INJECTION, SUSPENSION INTRAVENOUS at 11:26

## 2017-12-08 RX ADMIN — METOPROLOL TARTRATE 25 MG: 25 TABLET ORAL at 04:58

## 2017-12-08 NOTE — PLAN OF CARE
Problem: Patient Care Overview (Adult)  Goal: Plan of Care Review  Outcome: Ongoing (interventions implemented as appropriate)  Goal: Adult Individualization and Mutuality  Outcome: Ongoing (interventions implemented as appropriate)  Goal: Discharge Needs Assessment  Outcome: Ongoing (interventions implemented as appropriate)    Problem: Arrhythmia/Dysrhythmia (Symptomatic) (Adult)  Goal: Signs and Symptoms of Listed Potential Problems Will be Absent or Manageable (Arrhythmia/Dysrhythmia)  Outcome: Ongoing (interventions implemented as appropriate)    Problem: Pain, Acute (Adult)  Goal: Identify Related Risk Factors and Signs and Symptoms  Outcome: Ongoing (interventions implemented as appropriate)  Goal: Acceptable Pain Control/Comfort Level  Outcome: Ongoing (interventions implemented as appropriate)

## 2017-12-08 NOTE — DISCHARGE SUMMARY
Nito Corado  [id number]    Date of Admit: 12/7/2017  Date of Discharge:  12/8/2017    Discharge Diagnosis:  Active Hospital Problems (** Indicates Principal Problem)    Diagnosis Date Noted   • **Elevated WBC count [D72.829] 12/07/2017   • Abdominal pain [R10.9] 12/07/2017   • Nausea [R11.0] 12/07/2017   • Dyspnea [R06.00] 12/07/2017   • Shortness of breath [R06.02] 12/07/2017   • Atrial fibrillation [I48.91] 07/12/2017      Resolved Hospital Problems    Diagnosis Date Noted Date Resolved   No resolved problems to display.           Hospital Course:     Mr Corado is a 77 year old patient of esme and Dr Marti with a documented history of PAF on apixaban s/p cardioversion, sleep apnea on CPAP, HTN, and obesity.  He presented to [facility] ED yesterday with complaints of palpitations and was found to be in atrial fibrillation with RVR and -150.  This was accompanied with SOA, chest tightness.  The patient was started on diltiazem drip and transferred here for further evaluation.  He also complained of having a cold over the last few day. His WBC was elevated to 21,000 and one dose of IV Rocephin was given there     Upon arrival, he was seen by Dr Marti.  His heart rate was down and Cardizem drip was stopped.  He recommended rate control not rhythm control.  ECHO was done that showed EF 59%, mild to moderate aortic regurgitation, RA dilated, and LA volume increased.  His HR has stayed down in the 80's, WBC have decreased from 18 to 11.8 creatinine is elevated from 1.0 to 1.4 after Lasix 80 mg given yesterday. He is feeling much better today and wants to go home.  He will follow up with me in 4 weeks and will need to watch his creatinine.     I suspect he had a viral illness with transient AF/RVR.  Now better.  OK to home.    Procedures Performed    ECHO  · Limited 2D imaging of cardiac valves  · Left ventricular systolic function is normal. Calculated EF = 59.3%. Estimated EF was in agreement with the  calculated EF. Estimated EF = 59%. Normal left ventricular cavity size noted. All left ventricular wall segments contract normally. Left ventricular wall thickness is consistent with mild concentric hypertrophy. Left ventricular diastolic function was indeterminate. Elevated left atrial pressure.  · Left atrial volume is mildly increased.  · Right atrial cavity size is mildly dilated.  · Mild to moderate aortic valve regurgitation is present.  · The tricuspid valve is grossly normal. Trace tricuspid valve regurgitation is present. Estimated right ventricular systolic pressure from tricuspid regurgitation is normal (<35 mmHg).           Consults     Date and Time Order Name Status Description    12/7/2017 0921 Inpatient Consult to Internal Medicine            Discharge Medications     Your medication list      CONTINUE taking these medications       Instructions Last Dose Given Next Dose Due    apixaban 5 MG tablet tablet   Commonly known as:  ELIQUIS        Take 1 tablet by mouth 2 (Two) Times a Day.         metoprolol tartrate 25 MG tablet   Commonly known as:  LOPRESSOR        Take 1 tablet by mouth 2 (Two) Times a Day.         triamcinolone 0.1 % ointment   Commonly known as:  KENALOG                    Discharge Diet:   Diet Instructions     **Follow a heart healthy diet.**           Healthy Heart Diet    Activity at Discharge:   Activity Instructions     *Gradually resume normal activity.**           As tolerated    Discharge disposition: Home    Condition on Discharge: Stable    Follow-up Appointments  Future Appointments  Date Time Provider Department Center   3/2/2018 11:20 AM Scooby Marti MD [id number] None     Additional Instructions for the Follow-ups that You Need to Schedule     Discharge Follow-up with Specified Provider: Dr. Ramirez; 1 Month    As directed    To:  Dr. Ramirez   Follow Up:  1 Month                 Test Results Pending at Discharge       Lee Ramirez MD  12/08/17  4:30  PM

## 2017-12-08 NOTE — PLAN OF CARE
Problem: Patient Care Overview (Adult)  Goal: Plan of Care Review  Outcome: Outcome(s) achieved Date Met:  12/08/17  Goal: Adult Individualization and Mutuality  Outcome: Outcome(s) achieved Date Met:  12/08/17  Goal: Discharge Needs Assessment  Outcome: Outcome(s) achieved Date Met:  12/08/17    Problem: Arrhythmia/Dysrhythmia (Symptomatic) (Adult)  Goal: Signs and Symptoms of Listed Potential Problems Will be Absent or Manageable (Arrhythmia/Dysrhythmia)  Outcome: Outcome(s) achieved Date Met:  12/08/17    Problem: Pain, Acute (Adult)  Goal: Identify Related Risk Factors and Signs and Symptoms  Outcome: Outcome(s) achieved Date Met:  12/08/17  Goal: Acceptable Pain Control/Comfort Level  Outcome: Ongoing (interventions implemented as appropriate)

## 2017-12-08 NOTE — PROGRESS NOTES
[facility]     LOS: 0 days     Subjective:  soa is much better  No chest pain  No abdominal pain  Eat good  Energy is much better    Objective:    Vital Signs:  Temp:  [97.6 °F (36.4 °C)-98 °F (36.7 °C)] 98 °F (36.7 °C)  Heart Rate:  [] 115  Resp:  [16-18] 18  BP: (107-151)/(66-85) 107/78    General Appearance: no acute distress, appears comfortable  Heart: regular rate and rhythm  Lungs: clear to auscultation bilaterally, respirations unlabored, good air entry  Abdomen: soft, non-tender, no guarding, no rebound, non-distended  Extremities: no edema, no cyanosis  Neurology: speech normal, CN grossly normal  Psychiatric: normal mood and affect    Results Review:    Glucose   Date Value Ref Range Status   12/08/2017 140 (H) 65 - 99 mg/dL Final   12/07/2017 137 (H) 65 - 99 mg/dL Final       Results from last 7 days  Lab Units 12/08/17  0401   WBC 10*3/mm3 11.88*   HEMOGLOBIN g/dL 14.9   HEMATOCRIT % 46.2   PLATELETS 10*3/mm3 197       Results from last 7 days  Lab Units 12/08/17  0401   SODIUM mmol/L 137   POTASSIUM mmol/L 3.9   CHLORIDE mmol/L 101   CO2 mmol/L 23.3   BUN mg/dL 33*   CREATININE mg/dL 1.40*   CALCIUM mg/dL 9.1   BILIRUBIN mg/dL 1.0   ALK PHOS U/L 64   ALT (SGPT) U/L 13   AST (SGOT) U/L 13   GLUCOSE mg/dL 140*               Results from last 7 days  Lab Units 12/07/17  0937   TROPONIN T ng/mL <0.010     Cultures:       I have reviewed daily medications and changes in CPOE    Scheduled meds    apixaban 5 mg Oral BID   metoprolol tartrate 25 mg Oral Q8H          PRN meds        Principal Problem:    Elevated WBC count  Active Problems:    Abdominal pain    Atrial fibrillation    Nausea    Dyspnea    Shortness of breath  creatinine elevation likely from the Iv lasix       Assessment/Plan:      Elevated WBC count is resolved     nausea but denies abdominal pain      Atrial fibrillation with rapid response    Nausea      Dyspnea      Shortness of breath  procal is up  slightly but wbc is much better and temperature curve is totally normal    Heart rate is noted to be elevated this am      Plan:  Cbc and bmp in one week    procal minimally higher but this is likely related to the slight worse kidney function.    Chest xray may now show pneumonia but patient is feeling much better and temp curve is totally normal and o2 sats 94%. WBC is now near normal. He did get a dose of antibiotics at [facility] per the family prior to coming to [facility].  Patient needs to do deep breathing exercised.  -f/u chest xray in 1 weeks or as recommended by PMD    Stewart Vizcarra MD  12/08/17  8:38 AM

## 2017-12-08 NOTE — PLAN OF CARE
Problem: Patient Care Overview (Adult)  Goal: Plan of Care Review  Outcome: Ongoing (interventions implemented as appropriate)    12/08/17 0506   Coping/Psychosocial Response Interventions   Plan Of Care Reviewed With patient   Patient Care Overview   Progress no change   Outcome Evaluation   Outcome Summary/Follow up Plan Pt remains in afib 80's-90's, denies chest pain/discomfort, palpatations or soa, VSS       Goal: Discharge Needs Assessment  Outcome: Ongoing (interventions implemented as appropriate)    Problem: Arrhythmia/Dysrhythmia (Symptomatic) (Adult)  Goal: Signs and Symptoms of Listed Potential Problems Will be Absent or Manageable (Arrhythmia/Dysrhythmia)  Outcome: Ongoing (interventions implemented as appropriate)    Problem: Pain, Acute (Adult)  Goal: Identify Related Risk Factors and Signs and Symptoms  Outcome: Ongoing (interventions implemented as appropriate)  Goal: Acceptable Pain Control/Comfort Level  Outcome: Ongoing (interventions implemented as appropriate)

## 2017-12-08 NOTE — DISCHARGE INSTR - LAB
**You will need to have a CMP, CBC and CXR with your Primary care doctor in one week. Call to set this up.**

## 2018-01-11 ENCOUNTER — OFFICE VISIT (OUTPATIENT)
Dept: CARDIOLOGY | Facility: CLINIC | Age: 78
End: 2018-01-11

## 2018-01-11 VITALS
HEIGHT: 72 IN | DIASTOLIC BLOOD PRESSURE: 70 MMHG | SYSTOLIC BLOOD PRESSURE: 132 MMHG | BODY MASS INDEX: 27.5 KG/M2 | WEIGHT: 203 LBS | HEART RATE: 82 BPM

## 2018-01-11 DIAGNOSIS — I48.20 CHRONIC ATRIAL FIBRILLATION (HCC): Primary | ICD-10-CM

## 2018-01-11 PROCEDURE — 93000 ELECTROCARDIOGRAM COMPLETE: CPT | Performed by: INTERNAL MEDICINE

## 2018-01-11 PROCEDURE — 99213 OFFICE O/P EST LOW 20 MIN: CPT | Performed by: INTERNAL MEDICINE

## 2018-02-05 NOTE — PROGRESS NOTES
Subjective:     Encounter Date:01/11/2018      Patient ID: Nito Corado is a 77 y.o. male.    Chief Complaint: CAF    History of Present Illness    Dear Dr. Be,     I had the pleasure of seeing your patient Nito Corado in cardiac followup today.  As you well know, he is a libia 77-year-old man with history of paroxysmal atrial fibrillation.  He is on apixaban for stroke prevention.  He has had cardioversion in the past which maintained sinus rhythm only for a short.  The propafenone did not help maintain sinus rhythm.      He was admitted to the hospital last month with some dyspnea and palpitations.  He was treated for atrial fibrillation with rapid response and did well.      Since his hospitalization, he continues to do well.  He has no complaints of angina or heart failure.  He remains in atrial fibrillation, but his rate is now well controlled.          Review of Systems   All other systems reviewed and are negative.        ECG 12 Lead  Date/Time: 1/11/2018 11:53 AM  Performed by: EDUARDO OSHEA  Authorized by: EDUARDO OSHEA   Comparison: compared with previous ECG   Similar to previous ECG  Rhythm: atrial fibrillation  BPM: 82                 Objective:     Physical Exam   Constitutional: He is oriented to person, place, and time. He appears well-developed and well-nourished.   HENT:   Head: Normocephalic and atraumatic.   Neck: Normal range of motion. Neck supple.   Cardiovascular: Normal rate and normal heart sounds.  An irregularly irregular rhythm present.   Pulmonary/Chest: Effort normal and breath sounds normal.   Abdominal: Soft. Bowel sounds are normal.   Musculoskeletal: Normal range of motion.   Neurological: He is alert and oriented to person, place, and time.   Skin: Skin is warm and dry.   Psychiatric: He has a normal mood and affect. His behavior is normal. Thought content normal.   Vitals reviewed.      Lab Review:       Assessment:          Diagnosis Plan   1. AF (paroxysmal atrial  fibrillation)            Plan:       It was a pleasure to see your patient in cardiac follow-up today.  He is doing very well from the cardiac standpoint.  His atrial fibrillation is well controlled on his current regimen.  He remains anticoagulated.      I have made no changes at this time.  He will see me again at his previously scheduled appointment in six months.      Atrial Fibrillation and Atrial Flutter  Assessment  • The patient has permanent atrial fibrillation  • This is non-valvular in etiology  • The patient's CHADS2-VASc score is 2  • A CDT1GV0-DPGl score of 2 or more is considered a high risk for a thromboembolic event  • Apixaban prescribed    Plan  • Continue in atrial fibrillation with rate control  • Continue apixaban for antithrombotic therapy, bleeding issues discussed  • Continue beta blocker for rate control    Subjective - Objective  • The patient underwent cardioversion

## 2018-04-02 RX ORDER — APIXABAN 5 MG/1
TABLET, FILM COATED ORAL
Qty: 60 TABLET | Refills: 11 | Status: SHIPPED | OUTPATIENT
Start: 2018-04-02 | End: 2019-02-14 | Stop reason: SDUPTHER

## 2018-07-12 ENCOUNTER — OFFICE VISIT (OUTPATIENT)
Dept: CARDIOLOGY | Facility: CLINIC | Age: 78
End: 2018-07-12

## 2018-07-12 VITALS
HEIGHT: 72 IN | SYSTOLIC BLOOD PRESSURE: 144 MMHG | HEART RATE: 76 BPM | WEIGHT: 232 LBS | BODY MASS INDEX: 31.42 KG/M2 | DIASTOLIC BLOOD PRESSURE: 82 MMHG

## 2018-07-12 DIAGNOSIS — G47.33 OBSTRUCTIVE SLEEP APNEA SYNDROME: ICD-10-CM

## 2018-07-12 DIAGNOSIS — R06.09 DYSPNEA ON EXERTION: ICD-10-CM

## 2018-07-12 DIAGNOSIS — I48.20 CHRONIC ATRIAL FIBRILLATION (HCC): Primary | ICD-10-CM

## 2018-07-12 PROCEDURE — 93000 ELECTROCARDIOGRAM COMPLETE: CPT | Performed by: INTERNAL MEDICINE

## 2018-07-12 PROCEDURE — 99213 OFFICE O/P EST LOW 20 MIN: CPT | Performed by: INTERNAL MEDICINE

## 2018-07-12 NOTE — PROGRESS NOTES
Subjective:     Encounter Date:07/12/2018      Patient ID: Nito Corado is a 77 y.o. male.    Chief Complaint: PAF, sleep apnea, dyspnea    History of Present Illness    He is a libia 77-year-old man with chronic atrial fibrillation. I have tried twice to cardiovert him, both of which only lasted a short period of time. Propafenone did not seem to help maintain sinus rhythm. The longest he was in normal rhythm was about 1 day. Over that time, he did not notice a difference in his symptoms.    He has been maintained on apixaban for stroke prevention and metoprolol for rate control.    He continues to suffer from chronic dyspnea. He thinks that it may be atrial fibrillation related. He saw Dr. Marti of the arrhythmia service who did not think ablation would be appropriate for him.        Review of Systems   All other systems reviewed and are negative.        ECG 12 Lead  Date/Time: 7/12/2018 11:22 AM  Performed by: EDUARDO OSHEA  Authorized by: EDUARDO OSHEA   Comparison: compared with previous ECG   Similar to previous ECG  Rhythm: atrial fibrillation  BPM: 76  Q waves: II, III and aVF                 Objective:     Physical Exam   Constitutional: He is oriented to person, place, and time. He appears well-developed and well-nourished.   HENT:   Head: Normocephalic and atraumatic.   Neck: Normal range of motion. Neck supple.   Cardiovascular: Normal rate and normal heart sounds.  An irregularly irregular rhythm present.   Pulmonary/Chest: Effort normal and breath sounds normal.   Abdominal: Soft. Bowel sounds are normal.   Musculoskeletal: Normal range of motion.   Neurological: He is alert and oriented to person, place, and time.   Skin: Skin is warm and dry.   Psychiatric: He has a normal mood and affect. His behavior is normal. Thought content normal.   Vitals reviewed.      Lab Review:       Assessment:          Diagnosis Plan   1. Chronic atrial fibrillation (CMS/HCC)     2. Obstructive sleep apnea syndrome      3. Dyspnea on exertion            Plan:       It was a pleasure to see your patient in cardiac followup today. He continues to suffer from chronic atrial fibrillation. He is well protected with apixaban for stroke prevention and metoprolol for rate control.    I do not think that further attempts at treating his atrial fibrillation will be fruitful. I have urged him to try to find other ways to help improve his dyspnea and fatigue. I think he needs to find a new primary care physician, as Dr. Be as retired. He will see me again in 6 months or sooner if symptoms warrant.    Atrial Fibrillation and Atrial Flutter  Assessment  • The patient has permanent atrial fibrillation  • This is non-valvular in etiology  • The patient's CHADS2-VASc score is 2  • A [id number]-VGDp score of 2 or more is considered a high risk for a thromboembolic event  • Apixaban prescribed    Plan  • Continue in atrial fibrillation with rate control  • Continue apixaban for antithrombotic therapy, bleeding issues discussed  • Continue beta blocker for rate control    Subjective - Objective  • The patient underwent cardioversion

## 2018-09-20 ENCOUNTER — TELEPHONE (OUTPATIENT)
Dept: CARDIOLOGY | Facility: CLINIC | Age: 78
End: 2018-09-20

## 2019-01-10 ENCOUNTER — OFFICE VISIT (OUTPATIENT)
Dept: CARDIOLOGY | Facility: CLINIC | Age: 79
End: 2019-01-10

## 2019-01-10 VITALS
WEIGHT: 235 LBS | HEART RATE: 69 BPM | SYSTOLIC BLOOD PRESSURE: 122 MMHG | HEIGHT: 72 IN | DIASTOLIC BLOOD PRESSURE: 62 MMHG | BODY MASS INDEX: 31.83 KG/M2

## 2019-01-10 DIAGNOSIS — R06.09 DYSPNEA ON EXERTION: ICD-10-CM

## 2019-01-10 DIAGNOSIS — I48.20 CHRONIC ATRIAL FIBRILLATION (HCC): Primary | ICD-10-CM

## 2019-01-10 PROCEDURE — 99213 OFFICE O/P EST LOW 20 MIN: CPT | Performed by: INTERNAL MEDICINE

## 2019-01-10 PROCEDURE — 93000 ELECTROCARDIOGRAM COMPLETE: CPT | Performed by: INTERNAL MEDICINE

## 2019-01-10 RX ORDER — BUDESONIDE AND FORMOTEROL FUMARATE DIHYDRATE 80; 4.5 UG/1; UG/1
2 AEROSOL RESPIRATORY (INHALATION)
COMMUNITY
End: 2020-07-17

## 2019-01-10 NOTE — PROGRESS NOTES
Subjective:     Encounter Date:01/10/2019      Patient ID: Nito Corado is a 78 y.o. male.    Chief Complaint: CAF, dyspnea    History of Present Illness    Dear Dr. Weaver:    I had the pleasure of seeing your patient in cardiac followup today. As you well know, he is a libia 78-year-old man with history of chronic atrial fibrillation. We tried cardioverting him twice and he only lasted in sinus rhythm a short time. When he was in sinus rhythm, he did not have any change in his symptoms. He tried propafenone which did not help maintain in sinus rhythm.    He remains on apixaban for stroke prevention. He is on metoprolol for rate control. He saw Dr. Marti for possible AFib ablation, but he was advised to just stay on rate control and anticoagulation.    The patient continues to complain of generalized fatigue. He does not do much exercise. He says that if he can hold onto something like a shopping cart, he can actually walk quite a bit.        Review of Systems   All other systems reviewed and are negative.        ECG 12 Lead  Date/Time: 1/10/2019 11:55 AM  Performed by: Lee Ramirez MD  Authorized by: Lee Ramirez MD   Comparison: compared with previous ECG   Similar to previous ECG  Rhythm: atrial fibrillation  BPM: 69  Conduction: LAFB               Objective:     Physical Exam   Constitutional: He is oriented to person, place, and time. He appears well-developed and well-nourished.   HENT:   Head: Normocephalic and atraumatic.   Neck: Normal range of motion. Neck supple.   Cardiovascular: Normal rate and normal heart sounds. An irregularly irregular rhythm present.   Pulmonary/Chest: Effort normal and breath sounds normal.   Abdominal: Soft. Bowel sounds are normal.   Musculoskeletal: Normal range of motion.   Neurological: He is alert and oriented to person, place, and time.   Skin: Skin is warm and dry.   Psychiatric: He has a normal mood and affect. His behavior is normal. Thought content normal.    Vitals reviewed.      Lab Review:       Assessment:          Diagnosis Plan   1. Chronic atrial fibrillation (CMS/HCC)     2. Dyspnea on exertion            Plan:       It was a pleasure to see your patient in cardiac followup today. He is stable from the cardiac standpoint. His symptoms limit his activity. I do not see any major cardiac cause for this. I think he would benefit from increasing his physical activity. He states that you have recommended an exercise program, which I strongly support. He will see me again in 6 months or sooner if symptoms warrant.        Atrial Fibrillation and Atrial Flutter  Assessment  • The patient has permanent atrial fibrillation  • This is non-valvular in etiology  • The patient's CHADS2-VASc score is 2  • A [id number] score of 2 or more is considered a high risk for a thromboembolic event  • Apixaban prescribed    Plan  • Continue in atrial fibrillation with rate control  • Continue apixaban for antithrombotic therapy, bleeding issues discussed  • Continue beta blocker for rate control    Subjective - Objective  • The patient underwent cardioversion

## 2019-05-02 ENCOUNTER — OFFICE VISIT (OUTPATIENT)
Dept: CARDIOLOGY | Facility: CLINIC | Age: 79
End: 2019-05-02

## 2019-05-02 VITALS
HEIGHT: 72 IN | DIASTOLIC BLOOD PRESSURE: 110 MMHG | WEIGHT: 231 LBS | SYSTOLIC BLOOD PRESSURE: 196 MMHG | BODY MASS INDEX: 31.29 KG/M2 | HEART RATE: 72 BPM

## 2019-05-02 DIAGNOSIS — I48.20 CHRONIC ATRIAL FIBRILLATION (HCC): Primary | ICD-10-CM

## 2019-05-02 PROCEDURE — 99213 OFFICE O/P EST LOW 20 MIN: CPT | Performed by: INTERNAL MEDICINE

## 2019-05-02 PROCEDURE — 93000 ELECTROCARDIOGRAM COMPLETE: CPT | Performed by: INTERNAL MEDICINE

## 2019-05-02 NOTE — PROGRESS NOTES
Subjective:     Encounter Date:05/02/2019      Patient ID: Nito Corado is a 78 y.o. male.    Chief Complaint: CAF    History of Present Illness    Dear Dr. Be,     I had the pleasure of seeing your patient in cardiac followup today. As you well know, he is a libia 78-year-old man with history of chronic atrial fibrillation. He has had cardioversion twice and maintained sinus rhythm only for a short period of time. When he did so he had no change in his symptoms.     He tried propafenone which did not help maintain sinus rhythm. He saw Dr. Marti for possible Afib ablation but in the end decided to be treated medically.    Since I have last seen him he reports doing well. He has no complaints of angina, dyspnea, or palpitations. He remains in Afib and is anticoagulated with apixaban.     Recently he has been suffering from allergies. He was placed on several medications including steroids. I think this is the cause of his hypertension today.         Review of Systems   All other systems reviewed and are negative.        ECG 12 Lead  Date/Time: 5/2/2019 11:46 AM  Performed by: Lee Ramirez MD  Authorized by: Lee Ramirez MD   Comparison: compared with previous ECG   Similar to previous ECG  Rhythm: atrial fibrillation  BPM: 72                 Objective:     Physical Exam   Constitutional: He is oriented to person, place, and time. He appears well-developed and well-nourished.   HENT:   Head: Normocephalic and atraumatic.   Neck: Normal range of motion. Neck supple.   Cardiovascular: Normal rate and normal heart sounds. An irregularly irregular rhythm present.   Pulmonary/Chest: Effort normal and breath sounds normal.   Abdominal: Soft. Bowel sounds are normal.   Musculoskeletal: Normal range of motion.   Neurological: He is alert and oriented to person, place, and time.   Skin: Skin is warm and dry.   Psychiatric: He has a normal mood and affect. His behavior is normal. Thought content normal.   Vitals  reviewed.      Lab Review:       Assessment:          Diagnosis Plan   1. Chronic atrial fibrillation (CMS/HCC)            Plan:       It was a pleasure to see your patient in cardiac followup today. He is doing quite well from the cardiac standpoint without any complaints. I think his acute hypertension is likely due to his steroid use for allergies. I asked him to track his blood pressure and see that it resolves once his steroids wean down.    He remains on anticoagulation for atrial fibrillation. He will see us again in 6 months or sooner if symptoms warrant.         Atrial Fibrillation and Atrial Flutter  Assessment  • The patient has permanent atrial fibrillation  • This is non-valvular in etiology  • The patient's CHADS2-VASc score is 2  • A AIL9RR3-YALc score of 2 or more is considered a high risk for a thromboembolic event  • Apixaban prescribed    Plan  • Continue in atrial fibrillation with rate control  • Continue apixaban for antithrombotic therapy, bleeding issues discussed  • Continue beta blocker for rate control    Subjective - Objective  • The patient underwent cardioversion

## 2020-01-02 RX ORDER — METOPROLOL TARTRATE 50 MG/1
50 TABLET, FILM COATED ORAL 2 TIMES DAILY
Refills: 0 | COMMUNITY
Start: 2019-10-07

## 2020-01-03 ENCOUNTER — OFFICE VISIT (OUTPATIENT)
Dept: CARDIOLOGY | Facility: CLINIC | Age: 80
End: 2020-01-03

## 2020-01-03 VITALS
DIASTOLIC BLOOD PRESSURE: 80 MMHG | SYSTOLIC BLOOD PRESSURE: 148 MMHG | BODY MASS INDEX: 31.26 KG/M2 | HEART RATE: 65 BPM | HEIGHT: 72 IN | WEIGHT: 230.8 LBS

## 2020-01-03 DIAGNOSIS — R53.83 OTHER FATIGUE: ICD-10-CM

## 2020-01-03 DIAGNOSIS — I48.21 PERMANENT ATRIAL FIBRILLATION (HCC): Primary | ICD-10-CM

## 2020-01-03 DIAGNOSIS — I10 ESSENTIAL HYPERTENSION: ICD-10-CM

## 2020-01-03 DIAGNOSIS — R06.09 DYSPNEA ON EXERTION: ICD-10-CM

## 2020-01-03 PROCEDURE — 99214 OFFICE O/P EST MOD 30 MIN: CPT | Performed by: INTERNAL MEDICINE

## 2020-01-03 PROCEDURE — 93000 ELECTROCARDIOGRAM COMPLETE: CPT | Performed by: INTERNAL MEDICINE

## 2020-01-03 RX ORDER — AMLODIPINE AND OLMESARTAN MEDOXOMIL 5; 40 MG/1; MG/1
1 TABLET ORAL DAILY
COMMUNITY
End: 2020-07-17 | Stop reason: DRUGHIGH

## 2020-01-03 NOTE — PROGRESS NOTES
Subjective:     Encounter Date:01/03/2020      Patient ID: Nito Corado is a 79 y.o. male.    Chief Complaint:  History of Present Illness    This is a 79-year-old with a history of chronic atrial fibrillation, hypertension, who presents for follow-up.      The patient was previously followed by Dr. Ramirez who he last saw in 5/2019.  He initially saw Dr. Ramirez in 2/2017 with new diagnosis of atrial fibrillation.  When he saw Dr. Ramirez at the time was the first time he had began seeing physicians again.  He was seen by Dr. Be around that time with complaints of dyspnea.  He was noted to be in atrial fibrillation with a new right bundle branch block at the time.  It was unclear how long he had been in atrial fibrillation.  It was recommended that he start metoprolol tartrate 25 mg twice a day for rate control and apixaban for anticoagulation.  It was also set up for a stress test and an echocardiogram.  These were performed in 3/2017.  His stress test showed evidence of apical ischemia.  His echocardiogram showed normal left ventricular systolic function wall motion with an EF of 50 to 55%, moderately dilated left atrium, and moderate aortic regurgitation.      At the time Dr. Ramirez recommended proceeding with a cardioversion to see if this would help with his symptoms.  He underwent cardioversion in 3/2017 but he only lasted in sinus rhythm for about 10 minutes.  He was then placed on propafenone and known and another attempted a cardioversion was performed in 5/2017.  This time around he remained in atrial fibrillation for just about a day.  He noted no significant change in his symptoms the day he remained in sinus rhythm.  He was then evaluated by Dr. Marti in 7/2017.  When he saw Dr. Marti at that time he reported fatigue starting 2 years prior but that the shortness of breath started earlier that year.  Dr. Marti did not recommend any further attempts to obtain sinus rhythm and recommended that the  patient stop propafenone and undergo a sleep evaluation for sleep apnea.  It appears that since then he has been diagnosed with sleep apnea and is been compliant with CPAP.    He presents today for routine follow-up.  He continues to have fatigue, dyspnea on exertion that is largely unchanged.  He denies any chest pain, palpitations, orthopnea, near-syncope or syncope, or lower extremity edema.  His activity is largely limited by the above symptoms.  He has not really made any efforts to increase his activity or exercise.  He remains compliant with his metoprolol and apixaban.  He has been started on olmesartan/amlodipine by Dr. Patel for his hypertension.    Review of Systems   Constitution: Positive for malaise/fatigue.   HENT: Negative for hearing loss, hoarse voice, nosebleeds and sore throat.    Eyes: Negative for pain.   Cardiovascular: Positive for dyspnea on exertion. Negative for chest pain, claudication, cyanosis, irregular heartbeat, leg swelling, near-syncope, orthopnea, palpitations, paroxysmal nocturnal dyspnea and syncope.   Respiratory: Negative for shortness of breath and snoring.    Endocrine: Negative for cold intolerance, heat intolerance, polydipsia, polyphagia and polyuria.   Skin: Negative for itching and rash.   Musculoskeletal: Negative for arthritis, falls, joint pain, joint swelling, muscle cramps, muscle weakness and myalgias.   Gastrointestinal: Negative for constipation, diarrhea, dysphagia, heartburn, hematemesis, hematochezia, melena, nausea and vomiting.   Genitourinary: Negative for frequency, hematuria and hesitancy.   Neurological: Positive for light-headedness. Negative for excessive daytime sleepiness, dizziness, headaches, numbness and weakness.   Psychiatric/Behavioral: Negative for depression. The patient is not nervous/anxious.          Current Outpatient Medications:   •  amlodipine-olmesartan (CAROLANN) 5-40 MG per tablet, Take 1 tablet by mouth Daily., Disp: , Rfl:   •   "apixaban (ELIQUIS) 5 MG tablet tablet, Take 1 tablet by mouth 2 (Two) Times a Day., Disp: 180 tablet, Rfl: 3  •  budesonide-formoterol (SYMBICORT) 80-4.5 MCG/ACT inhaler, Inhale 2 puffs 2 (Two) Times a Day., Disp: , Rfl:   •  metoprolol tartrate (LOPRESSOR) 50 MG tablet, Take 50 mg by mouth 2 (Two) Times a Day., Disp: , Rfl: 0    Past Medical History:   Diagnosis Date   • Allergic rhinitis    • Atrial fibrillation (CMS/HCC)    • Benign hypertension    • History of bleeding ulcers    • Marginal perforation of tympanic membrane    • Otorrhea    • PAF (paroxysmal atrial fibrillation) (CMS/HCC)        Past Surgical History:   Procedure Laterality Date   • KNEE SURGERY         Family History   Problem Relation Age of Onset   • Tuberculosis Father        Social History     Tobacco Use   • Smoking status: Former Smoker     Last attempt to quit: 1970     Years since quittin.1   • Smokeless tobacco: Former User   Substance Use Topics   • Alcohol use: Yes     Alcohol/week: 1.0 standard drinks     Types: 1 Shots of liquor per week   • Drug use: No         ECG 12 Lead  Date/Time: 1/3/2020 3:51 PM  Performed by: Nita Kumar MD  Authorized by: Nita Kumar MD   Comparison: compared with previous ECG   Similar to previous ECG  Rhythm: atrial fibrillation  Conduction: left anterior fascicular block               Objective:     Visit Vitals  /80   Pulse 65   Ht 182.9 cm (72\")   Wt 105 kg (230 lb 12.8 oz)   BMI 31.30 kg/m²         Physical Exam   Constitutional: He is oriented to person, place, and time. He appears well-developed and well-nourished.   HENT:   Head: Normocephalic and atraumatic.   Neck: No JVD present. Carotid bruit is not present.   Cardiovascular: Normal rate, S1 normal and S2 normal. An irregularly irregular rhythm present. Exam reveals no gallop.   No murmur heard.  Pulses:       Radial pulses are 2+ on the right side, and 2+ on the left side.   No bilateral lower extremity edema "   Pulmonary/Chest: Effort normal and breath sounds normal.   Abdominal: Soft. Normal appearance.   Neurological: He is alert and oriented to person, place, and time.   Skin: Skin is warm, dry and intact.   Psychiatric: He has a normal mood and affect.       Lab Review:       Assessment:          Diagnosis Plan   1. Permanent atrial fibrillation     2. Essential hypertension     3. Dyspnea on exertion     4. Other fatigue            Plan:       1.  Dyspnea on exertion/fatigue.  Symptoms appear to be largely stable.  It is unclear how much of his symptoms are related to his atrial fibrillation if at all.  Unfortunately we do not have any options to get him back into sinus rhythm.  On review of his prior cardiac work-up and it does appear that his stress test did show a mild abnormality in 2017 and his echocardiogram at that time showed aortic regurgitation.  Since his symptoms appear to be largely stable I do not think we need to repeat any work-up at this point but will consider a repeat stress test and echocardiogram if his symptoms continue to progress.  I also encouraged the patient to try and work on increasing his activity since this may help improve or at least prevent his exercise tolerance from continuing to worsen.  2.  Hypertension.  Mildly elevated in the office today.  He may need further titration of his antihypertensive medications.  Will defer this to Dr. Weaver.   3.  Atrial fibrillation.  Continue rate control with metoprolol tartrate.  Continue anticoagulation with apixaban.    We will plan on seeing the patient back again in 6 months.

## 2020-07-17 ENCOUNTER — OFFICE VISIT (OUTPATIENT)
Dept: CARDIOLOGY | Facility: CLINIC | Age: 80
End: 2020-07-17

## 2020-07-17 VITALS
OXYGEN SATURATION: 98 % | HEART RATE: 55 BPM | DIASTOLIC BLOOD PRESSURE: 88 MMHG | WEIGHT: 224.8 LBS | BODY MASS INDEX: 30.45 KG/M2 | HEIGHT: 72 IN | SYSTOLIC BLOOD PRESSURE: 154 MMHG

## 2020-07-17 DIAGNOSIS — G47.33 OSA ON CPAP: ICD-10-CM

## 2020-07-17 DIAGNOSIS — I10 ESSENTIAL HYPERTENSION: ICD-10-CM

## 2020-07-17 DIAGNOSIS — R06.09 DYSPNEA ON EXERTION: ICD-10-CM

## 2020-07-17 DIAGNOSIS — R53.83 OTHER FATIGUE: ICD-10-CM

## 2020-07-17 DIAGNOSIS — E66.3 OVERWEIGHT ON EXAMINATION: ICD-10-CM

## 2020-07-17 DIAGNOSIS — I48.21 PERMANENT ATRIAL FIBRILLATION (HCC): Primary | ICD-10-CM

## 2020-07-17 DIAGNOSIS — Z99.89 OSA ON CPAP: ICD-10-CM

## 2020-07-17 PROBLEM — R06.02 SHORTNESS OF BREATH: Status: RESOLVED | Noted: 2017-12-07 | Resolved: 2020-07-17

## 2020-07-17 PROBLEM — D72.829 ELEVATED WBC COUNT: Status: RESOLVED | Noted: 2017-12-07 | Resolved: 2020-07-17

## 2020-07-17 PROBLEM — R10.9 ABDOMINAL PAIN: Status: RESOLVED | Noted: 2017-12-07 | Resolved: 2020-07-17

## 2020-07-17 PROBLEM — R11.0 NAUSEA: Status: RESOLVED | Noted: 2017-12-07 | Resolved: 2020-07-17

## 2020-07-17 PROCEDURE — 99214 OFFICE O/P EST MOD 30 MIN: CPT | Performed by: INTERNAL MEDICINE

## 2020-07-17 PROCEDURE — 93000 ELECTROCARDIOGRAM COMPLETE: CPT | Performed by: INTERNAL MEDICINE

## 2020-07-17 RX ORDER — CETIRIZINE HYDROCHLORIDE 10 MG/1
10 TABLET ORAL DAILY
COMMUNITY
Start: 2020-06-22

## 2020-07-17 RX ORDER — MONTELUKAST SODIUM 10 MG/1
10 TABLET ORAL NIGHTLY
COMMUNITY

## 2020-07-17 RX ORDER — AMLODIPINE AND OLMESARTAN MEDOXOMIL 10; 40 MG/1; MG/1
1 TABLET ORAL DAILY
COMMUNITY
Start: 2020-06-01

## 2020-07-17 NOTE — PROGRESS NOTES
Subjective:     Encounter Date:07/17/2020      Patient ID: Nito Corado is a 79 y.o. male.    Chief Complaint:  History of Present Illness    This is a 79-year-old with a history of chronic atrial fibrillation, hypertension, who presents for follow-up.       He presents for follow-up today.  Since I saw him last he reports that he feels like his dyspnea on exertion and fatigue has been progressing.  He was seen by Dr. Patel in follow-up echocardiogram was ordered.  He reports that he ended up postponing this because of the COVID-19 pandemic.  He denies any chest pain, palpitations, orthopnea, near-syncope or syncope, or lower extremity edema.  He admits he really has not tried increasing his activity because he just got so out of breath with any attempt.    Prior History:  The patient was previously followed by Dr. Ramirez who he last saw in 5/2019.  He initially saw Dr. Ramirez in 2/2017 with new diagnosis of atrial fibrillation.  When he saw Dr. Ramirez at the time was the first time he had began seeing physicians again.  He was seen by Dr. Be around that time with complaints of dyspnea.  He was noted to be in atrial fibrillation with a new right bundle branch block at the time.  It was unclear how long he had been in atrial fibrillation.  It was recommended that he start metoprolol tartrate 25 mg twice a day for rate control and apixaban for anticoagulation.  It was also set up for a stress test and an echocardiogram.  These were performed in 3/2017.  His stress test showed evidence of apical ischemia.  His echocardiogram showed normal left ventricular systolic function wall motion with an EF of 50 to 55%, moderately dilated left atrium, and moderate aortic regurgitation.       At the time Dr. Ramirez recommended proceeding with a cardioversion to see if this would help with his symptoms.  He underwent cardioversion in 3/2017 but he only lasted in sinus rhythm for about 10 minutes.  He was then placed on propafenone  and known and another attempted a cardioversion was performed in 5/2017.  This time around he remained in atrial fibrillation for just about a day.  He noted no significant change in his symptoms the day he remained in sinus rhythm.  He was then evaluated by Dr. Marti in 7/2017.  When he saw Dr. Marti at that time he reported fatigue starting 2 years prior but that the shortness of breath started earlier that year.  Dr. Marti did not recommend any further attempts to obtain sinus rhythm and recommended that the patient stop propafenone and undergo a sleep evaluation for sleep apnea.  It appears that since then he has been diagnosed with sleep apnea and has been compliant with CPAP.     I saw him initially in 1/2020 at which time his symptoms were largely unchanged.  I recommended that he try and increase his activity before proceeding with further cardiac work up.       Review of Systems   Constitution: Negative for malaise/fatigue.   HENT: Negative for hearing loss, hoarse voice, nosebleeds and sore throat.    Eyes: Negative for pain.   Cardiovascular: Positive for dyspnea on exertion and leg swelling. Negative for chest pain, claudication, cyanosis, irregular heartbeat, near-syncope, orthopnea, palpitations, paroxysmal nocturnal dyspnea and syncope.   Respiratory: Negative for shortness of breath and snoring.    Endocrine: Negative for cold intolerance, heat intolerance, polydipsia, polyphagia and polyuria.   Skin: Negative for itching and rash.   Musculoskeletal: Negative for arthritis, falls, joint pain, joint swelling, muscle cramps, muscle weakness and myalgias.   Gastrointestinal: Negative for constipation, diarrhea, dysphagia, heartburn, hematemesis, hematochezia, melena, nausea and vomiting.   Genitourinary: Negative for frequency, hematuria and hesitancy.   Neurological: Negative for excessive daytime sleepiness, dizziness, headaches, light-headedness, numbness and weakness.  "  Psychiatric/Behavioral: Negative for depression. The patient is not nervous/anxious.          Current Outpatient Medications:   •  amlodipine-olmesartan (CAROLANN) 10-40 MG per tablet, Take 1 tablet by mouth Daily., Disp: , Rfl:   •  apixaban (Eliquis) 5 MG tablet tablet, Take 1 tablet by mouth 2 (Two) Times a Day., Disp: 180 tablet, Rfl: 3  •  cetirizine (zyrTEC) 10 MG tablet, Take 10 mg by mouth Daily., Disp: , Rfl:   •  Fluticasone Furoate-Vilanterol (Breo Ellipta) 200-25 MCG/INH inhaler, Inhale 1 puff Daily., Disp: , Rfl:   •  metoprolol tartrate (LOPRESSOR) 50 MG tablet, Take 50 mg by mouth 2 (Two) Times a Day., Disp: , Rfl: 0  •  montelukast (SINGULAIR) 10 MG tablet, Take 10 mg by mouth Every Night., Disp: , Rfl:     Past Medical History:   Diagnosis Date   • Allergic rhinitis    • Atrial fibrillation (CMS/HCC)    • Benign hypertension    • History of bleeding ulcers    • Marginal perforation of tympanic membrane    • Otorrhea    • PAF (paroxysmal atrial fibrillation) (CMS/HCC)        Past Surgical History:   Procedure Laterality Date   • KNEE SURGERY         Family History   Problem Relation Age of Onset   • Tuberculosis Father        Social History     Tobacco Use   • Smoking status: Former Smoker     Last attempt to quit: 1970     Years since quittin.6   • Smokeless tobacco: Former User   Substance Use Topics   • Alcohol use: Yes     Alcohol/week: 1.0 standard drinks     Types: 1 Shots of liquor per week   • Drug use: No         ECG 12 Lead  Date/Time: 2020 1:37 PM  Performed by: Nita Kumar MD  Authorized by: Nita Kumar MD   Comparison: compared with previous ECG   Similar to previous ECG  Rhythm: atrial fibrillation  Conduction: left anterior fascicular block               Objective:     Visit Vitals  /88 (BP Location: Right arm, Patient Position: Sitting, Cuff Size: Adult)   Pulse 55   Ht 182.9 cm (72\")   Wt 102 kg (224 lb 12.8 oz)   SpO2 98%   BMI 30.49 kg/m²         " Physical Exam   Constitutional: He is oriented to person, place, and time. He appears well-developed and well-nourished.   HENT:   Head: Normocephalic and atraumatic.   Eyes: Pupils are equal, round, and reactive to light. Conjunctivae, EOM and lids are normal.   Neck: Normal range of motion and full passive range of motion without pain. Neck supple. No JVD present. Carotid bruit is not present.   Cardiovascular: Normal rate, S1 normal and S2 normal. An irregularly irregular rhythm present. Exam reveals no gallop.   No murmur heard.  Pulses:       Radial pulses are 2+ on the right side, and 2+ on the left side.   Pulmonary/Chest: Effort normal and breath sounds normal.   Abdominal: Soft. Normal appearance.   Musculoskeletal: He exhibits no edema.   Lymphadenopathy:     He has no cervical adenopathy.   Neurological: He is alert and oriented to person, place, and time.   Skin: Skin is warm, dry and intact.   Psychiatric: He has a normal mood and affect.         Assessment:          Diagnosis Plan   1. Permanent atrial fibrillation (CMS/HCC)     2. Essential hypertension     3. Other fatigue     4. Dyspnea on exertion     5. Overweight on examination     6. MAXINE on CPAP            Plan:       1.  Dyspnea on exertion/fatigue.  Symptoms are ongoing continue to progress.  His cardiac work-ups in the past have been unremarkable.  His symptoms never really improved when he was briefly cardioverted to sinus rhythm.  I still suspect that deconditioning is playing a role in his symptoms but I think it is reasonable to repeat a cardiac work-up since his been about 3 years since he is had anything evaluated.  I recommended the patient that we go ahead and pursue a stress test and echocardiogram since the pandemic is likely to be an ongoing issue.  We will going to place another order for stress test and an echocardiogram.  2.  Hypertension.  Elevated in the office today but he reports it is normally well controlled.  3.  Chronic  atrial fibrillation.  Rate controlled on metoprolol tartrate.  On anticoagulation with apixaban.  Again I do not believe that his symptoms are related to his atrial fibrillation since he had no improvement when he was briefly cardioverted to sinus rhythm.    We will call and discuss results of the stress test and the echocardiogram and determine further recommendations based on those results.

## 2020-08-03 ENCOUNTER — TELEPHONE (OUTPATIENT)
Dept: CARDIOLOGY | Facility: CLINIC | Age: 80
End: 2020-08-03

## 2020-08-03 NOTE — TELEPHONE ENCOUNTER
Patient's wife calling to get Nito scheduled for Echo and Stress test at [facility].    She can be reached at [phone].    Thanks!

## 2020-08-14 NOTE — TELEPHONE ENCOUNTER
Patient is scheduled for Echo and Nuclear Stress test on 8/24 and Honey with [facility] Pre Cert Dept is needing orders and authorizations.    Honey can be reached at [phone].    Thanks!

## 2020-08-20 NOTE — TELEPHONE ENCOUNTER
[phone]    Honey herrera/ [facility] re: wanting to know if these tests need Authorizations.  Please advise.    EZ Hoffman

## 2020-08-21 NOTE — TELEPHONE ENCOUNTER
Patient is scheduled on Monday for these tests.    Can either of you help with precerting these so that patient doesn't have to be rescheduled.  He is on the schedule for Monday at [facility].    Thank you!

## 2020-08-31 NOTE — TELEPHONE ENCOUNTER
Honey with [facility] called today stating that she has not received authorization codes for testing yet.  Patient's tests have been moved to 9/3.    Please fax authorizations to [phone].  Questions?  She can be reached at [phone].    Thanks!

## 2020-09-03 ENCOUNTER — TELEPHONE (OUTPATIENT)
Dept: CARDIOLOGY | Facility: CLINIC | Age: 80
End: 2020-09-03

## 2020-09-03 DIAGNOSIS — R94.39 ABNORMAL NUCLEAR STRESS TEST: Primary | ICD-10-CM

## 2020-09-03 NOTE — TELEPHONE ENCOUNTER
GENNARO in case the patient calls back.  I called and left a message asking for call back to go over his stress test results.

## 2020-09-04 NOTE — TELEPHONE ENCOUNTER
Wife called back, states that he would like to think about this and he will let us know if/when he decides to move forward.

## 2020-09-04 NOTE — TELEPHONE ENCOUNTER
Called and discussed stress test and echo results.  He is still having dyspnea and fatigue on exertion.  Recommended proceeding with cardiac catheterization due to findings of inferior and apical ischemia.  I went over the procedure, risk, benefits at length with the patient's wife.  She is going to discuss this with the patient and will let us know if they want to proceed.

## 2020-09-15 NOTE — TELEPHONE ENCOUNTER
Patient's wife called today, Mr. Corado would like to go ahead and move forward with procedure.

## 2020-09-16 ENCOUNTER — TRANSCRIBE ORDERS (OUTPATIENT)
Dept: OBSTETRICS AND GYNECOLOGY | Facility: CLINIC | Age: 80
End: 2020-09-16

## 2020-09-16 DIAGNOSIS — Z01.818 OTHER SPECIFIED PRE-OPERATIVE EXAMINATION: Primary | ICD-10-CM

## 2020-09-16 PROBLEM — R94.39 ABNORMAL NUCLEAR STRESS TEST: Status: ACTIVE | Noted: 2020-09-16

## 2020-09-17 ENCOUNTER — TRANSCRIBE ORDERS (OUTPATIENT)
Dept: CARDIOLOGY | Facility: CLINIC | Age: 80
End: 2020-09-17

## 2020-09-17 ENCOUNTER — TELEPHONE (OUTPATIENT)
Dept: CARDIOLOGY | Facility: CLINIC | Age: 80
End: 2020-09-17

## 2020-09-17 DIAGNOSIS — Z01.810 PREPROCEDURAL CARDIOVASCULAR EXAMINATION: ICD-10-CM

## 2020-09-17 DIAGNOSIS — Z13.6 SCREENING FOR CARDIOVASCULAR CONDITION: Primary | ICD-10-CM

## 2020-09-17 NOTE — TELEPHONE ENCOUNTER
Called patient's wife.  She is requesting couple of doses of antianxiety medication.  He is required this in the past before procedures.  Can this be something sent in through epic?

## 2020-09-17 NOTE — TELEPHONE ENCOUNTER
Patient is scheduled for cath on Monday and his wife is calling today requesting something for Danial's nerves.  Anytime he has to go to hospital for anything it worries him to death and he gets really bad anxiety.  She said it would help him tremendously.    Please advise or call patient to discuss.    She can be reached at [phone].    Thanks!

## 2020-09-18 ENCOUNTER — LAB (OUTPATIENT)
Dept: LAB | Facility: HOSPITAL | Age: 80
End: 2020-09-18

## 2020-09-18 DIAGNOSIS — Z13.6 SCREENING FOR CARDIOVASCULAR CONDITION: ICD-10-CM

## 2020-09-18 DIAGNOSIS — Z01.818 OTHER SPECIFIED PRE-OPERATIVE EXAMINATION: ICD-10-CM

## 2020-09-18 DIAGNOSIS — Z01.810 PREPROCEDURAL CARDIOVASCULAR EXAMINATION: ICD-10-CM

## 2020-09-18 LAB
ANION GAP SERPL CALCULATED.3IONS-SCNC: 11.3 MMOL/L (ref 5–15)
BASOPHILS # BLD AUTO: 0.04 10*3/MM3 (ref 0–0.2)
BASOPHILS NFR BLD AUTO: 0.6 % (ref 0–1.5)
BUN SERPL-MCNC: 29 MG/DL (ref 8–23)
BUN/CREAT SERPL: 22.3 (ref 7–25)
CALCIUM SPEC-SCNC: 9.7 MG/DL (ref 8.6–10.5)
CHLORIDE SERPL-SCNC: 108 MMOL/L (ref 98–107)
CO2 SERPL-SCNC: 17.7 MMOL/L (ref 22–29)
CREAT SERPL-MCNC: 1.3 MG/DL (ref 0.76–1.27)
DEPRECATED RDW RBC AUTO: 42.7 FL (ref 37–54)
EOSINOPHIL # BLD AUTO: 0.29 10*3/MM3 (ref 0–0.4)
EOSINOPHIL NFR BLD AUTO: 4.3 % (ref 0.3–6.2)
ERYTHROCYTE [DISTWIDTH] IN BLOOD BY AUTOMATED COUNT: 13.2 % (ref 12.3–15.4)
GFR SERPL CREATININE-BSD FRML MDRD: 53 ML/MIN/1.73
GLUCOSE SERPL-MCNC: 100 MG/DL (ref 65–99)
HCT VFR BLD AUTO: 43.9 % (ref 37.5–51)
HGB BLD-MCNC: 15 G/DL (ref 13–17.7)
IMM GRANULOCYTES # BLD AUTO: 0.02 10*3/MM3 (ref 0–0.05)
IMM GRANULOCYTES NFR BLD AUTO: 0.3 % (ref 0–0.5)
LYMPHOCYTES # BLD AUTO: 1.85 10*3/MM3 (ref 0.7–3.1)
LYMPHOCYTES NFR BLD AUTO: 27.4 % (ref 19.6–45.3)
MCH RBC QN AUTO: 29.9 PG (ref 26.6–33)
MCHC RBC AUTO-ENTMCNC: 34.2 G/DL (ref 31.5–35.7)
MCV RBC AUTO: 87.6 FL (ref 79–97)
MONOCYTES # BLD AUTO: 0.91 10*3/MM3 (ref 0.1–0.9)
MONOCYTES NFR BLD AUTO: 13.5 % (ref 5–12)
NEUTROPHILS NFR BLD AUTO: 3.65 10*3/MM3 (ref 1.7–7)
NEUTROPHILS NFR BLD AUTO: 53.9 % (ref 42.7–76)
NRBC BLD AUTO-RTO: 0 /100 WBC (ref 0–0.2)
PLATELET # BLD AUTO: 215 10*3/MM3 (ref 140–450)
PMV BLD AUTO: 9.6 FL (ref 6–12)
POTASSIUM SERPL-SCNC: 4.4 MMOL/L (ref 3.5–5.2)
RBC # BLD AUTO: 5.01 10*6/MM3 (ref 4.14–5.8)
SODIUM SERPL-SCNC: 137 MMOL/L (ref 136–145)
WBC # BLD AUTO: 6.76 10*3/MM3 (ref 3.4–10.8)

## 2020-09-18 PROCEDURE — C9803 HOPD COVID-19 SPEC COLLECT: HCPCS

## 2020-09-18 PROCEDURE — 36415 COLL VENOUS BLD VENIPUNCTURE: CPT

## 2020-09-18 PROCEDURE — 85025 COMPLETE CBC W/AUTO DIFF WBC: CPT

## 2020-09-18 PROCEDURE — 80048 BASIC METABOLIC PNL TOTAL CA: CPT

## 2020-09-18 PROCEDURE — U0004 COV-19 TEST NON-CDC HGH THRU: HCPCS

## 2020-09-19 LAB — SARS-COV-2 RNA NOSE QL NAA+PROBE: NOT DETECTED

## 2020-09-21 ENCOUNTER — HOSPITAL ENCOUNTER (OUTPATIENT)
Facility: HOSPITAL | Age: 80
Setting detail: HOSPITAL OUTPATIENT SURGERY
Discharge: HOME | End: 2020-09-21
Attending: INTERNAL MEDICINE | Admitting: INTERNAL MEDICINE

## 2020-09-21 VITALS
OXYGEN SATURATION: 96 % | SYSTOLIC BLOOD PRESSURE: 134 MMHG | RESPIRATION RATE: 18 BRPM | HEIGHT: 72 IN | WEIGHT: 225 LBS | BODY MASS INDEX: 30.48 KG/M2 | HEART RATE: 61 BPM | DIASTOLIC BLOOD PRESSURE: 84 MMHG | TEMPERATURE: 97.1 F

## 2020-09-21 DIAGNOSIS — R94.39 ABNORMAL NUCLEAR STRESS TEST: ICD-10-CM

## 2020-09-21 LAB — ACT BLD: 318 SECONDS (ref 82–152)

## 2020-09-21 PROCEDURE — 93458 L HRT ARTERY/VENTRICLE ANGIO: CPT | Performed by: INTERNAL MEDICINE

## 2020-09-21 PROCEDURE — 25010000002 MIDAZOLAM PER 1 MG: Performed by: INTERNAL MEDICINE

## 2020-09-21 PROCEDURE — C1769 GUIDE WIRE: HCPCS | Performed by: INTERNAL MEDICINE

## 2020-09-21 PROCEDURE — C1887 CATHETER, GUIDING: HCPCS | Performed by: INTERNAL MEDICINE

## 2020-09-21 PROCEDURE — 93571 IV DOP VEL&/PRESS C FLO 1ST: CPT | Performed by: INTERNAL MEDICINE

## 2020-09-21 PROCEDURE — 25010000002 FENTANYL CITRATE (PF) 100 MCG/2ML SOLUTION: Performed by: INTERNAL MEDICINE

## 2020-09-21 PROCEDURE — 85347 COAGULATION TIME ACTIVATED: CPT

## 2020-09-21 PROCEDURE — C1894 INTRO/SHEATH, NON-LASER: HCPCS | Performed by: INTERNAL MEDICINE

## 2020-09-21 PROCEDURE — 99153 MOD SED SAME PHYS/QHP EA: CPT | Performed by: INTERNAL MEDICINE

## 2020-09-21 PROCEDURE — 25010000002 BH (CUPID ONLY) ADENOSINE 6 MG/100ML MIXTURE: Performed by: INTERNAL MEDICINE

## 2020-09-21 PROCEDURE — 25010000002 HEPARIN (PORCINE) PER 1000 UNITS: Performed by: INTERNAL MEDICINE

## 2020-09-21 PROCEDURE — S0260 H&P FOR SURGERY: HCPCS | Performed by: INTERNAL MEDICINE

## 2020-09-21 PROCEDURE — 0 IOPAMIDOL PER 1 ML: Performed by: INTERNAL MEDICINE

## 2020-09-21 PROCEDURE — 99152 MOD SED SAME PHYS/QHP 5/>YRS: CPT | Performed by: INTERNAL MEDICINE

## 2020-09-21 RX ORDER — LIDOCAINE HYDROCHLORIDE 10 MG/ML
0.1 INJECTION, SOLUTION EPIDURAL; INFILTRATION; INTRACAUDAL; PERINEURAL ONCE AS NEEDED
Status: DISCONTINUED | OUTPATIENT
Start: 2020-09-21 | End: 2020-09-21 | Stop reason: HOSPADM

## 2020-09-21 RX ORDER — ACETAMINOPHEN 500 MG
500 TABLET ORAL EVERY 6 HOURS PRN
COMMUNITY
End: 2021-06-18

## 2020-09-21 RX ORDER — MIDAZOLAM HYDROCHLORIDE 1 MG/ML
1 INJECTION INTRAMUSCULAR; INTRAVENOUS ONCE
Status: COMPLETED | OUTPATIENT
Start: 2020-09-21 | End: 2020-09-21

## 2020-09-21 RX ORDER — SODIUM CHLORIDE 0.9 % (FLUSH) 0.9 %
10 SYRINGE (ML) INJECTION AS NEEDED
Status: DISCONTINUED | OUTPATIENT
Start: 2020-09-21 | End: 2020-09-21 | Stop reason: HOSPADM

## 2020-09-21 RX ORDER — SODIUM CHLORIDE 0.9 % (FLUSH) 0.9 %
3 SYRINGE (ML) INJECTION EVERY 12 HOURS SCHEDULED
Status: DISCONTINUED | OUTPATIENT
Start: 2020-09-21 | End: 2020-09-21 | Stop reason: HOSPADM

## 2020-09-21 RX ORDER — HEPARIN SODIUM 1000 [USP'U]/ML
INJECTION, SOLUTION INTRAVENOUS; SUBCUTANEOUS AS NEEDED
Status: DISCONTINUED | OUTPATIENT
Start: 2020-09-21 | End: 2020-09-21 | Stop reason: HOSPADM

## 2020-09-21 RX ORDER — LIDOCAINE HYDROCHLORIDE 20 MG/ML
INJECTION, SOLUTION INFILTRATION; PERINEURAL AS NEEDED
Status: DISCONTINUED | OUTPATIENT
Start: 2020-09-21 | End: 2020-09-21 | Stop reason: HOSPADM

## 2020-09-21 RX ORDER — SODIUM CHLORIDE 9 MG/ML
75 INJECTION, SOLUTION INTRAVENOUS CONTINUOUS
Status: DISCONTINUED | OUTPATIENT
Start: 2020-09-21 | End: 2020-09-21 | Stop reason: HOSPADM

## 2020-09-21 RX ORDER — ASPIRIN 81 MG/1
81 TABLET ORAL DAILY
Start: 2020-09-21

## 2020-09-21 RX ORDER — MIDAZOLAM HYDROCHLORIDE 1 MG/ML
INJECTION INTRAMUSCULAR; INTRAVENOUS AS NEEDED
Status: DISCONTINUED | OUTPATIENT
Start: 2020-09-21 | End: 2020-09-21 | Stop reason: HOSPADM

## 2020-09-21 RX ORDER — ACETAMINOPHEN 325 MG/1
650 TABLET ORAL EVERY 4 HOURS PRN
Status: DISCONTINUED | OUTPATIENT
Start: 2020-09-21 | End: 2020-09-21 | Stop reason: HOSPADM

## 2020-09-21 RX ORDER — FENTANYL CITRATE 50 UG/ML
INJECTION, SOLUTION INTRAMUSCULAR; INTRAVENOUS AS NEEDED
Status: DISCONTINUED | OUTPATIENT
Start: 2020-09-21 | End: 2020-09-21 | Stop reason: HOSPADM

## 2020-09-21 RX ADMIN — SODIUM CHLORIDE 75 ML/HR: 9 INJECTION, SOLUTION INTRAVENOUS at 08:57

## 2020-09-21 RX ADMIN — MIDAZOLAM 1 MG: 1 INJECTION INTRAMUSCULAR; INTRAVENOUS at 09:04

## 2020-09-21 NOTE — DISCHARGE INSTRUCTIONS
[facility]  [address] [place] [address]    Coronary Angiogram (Radial/Ulnar Approach) After Care    Refer to this sheet in the next few weeks. These instructions provide you with information on caring for yourself after your procedure. Your caregiver may also give you more specific instructions. Your treatment has been planned according to current medical practices, but problems sometimes occur. Call your caregiver if you have any problems or questions after your procedure.    Home Care Instructions:  · You may shower the day after the procedure. Remove the bandage (dressing) and gently wash the site with plain soap and water. Gently pat the site dry. You may apply a band aid daily for 2 days if desired.    · Do not apply powder or lotion to the site.  · Do not submerge the affected site in water for 3 to 5 days or until the site is completely healed.   · Do not lift, push or pull anything over 5 pounds for 5 days after your procedure. As a reference, a gallon of milk weighs 8 pounds.   · Inspect the site at least twice daily. You may notice some bruising at the site and it may be tender for 1 to 2 weeks.     · Increase your fluid intake for the next 2 days.    · Keep arm elevated for 24 hours. For the remainder of the day, keep your arm in “Pledge of Allegiance” position when up and about.     · You may drive 24 hours after the procedure unless otherwise instructed by your caregiver.  · Do not operate machinery or power tools for 24 hours.  · A responsible adult should be with you for the first 24 hours after you arrive home. Do not make any important legal decisions or sign legal papers for 24 hours.  Do not drink alcohol for 24 hours.    · Metformin or any medications containing Metformin should not be taken for 48 hours after your procedure.      Call Your Doctor if:   · You have unusual pain at the radial/ulnar (wrist) site.  · You have redness, warmth, swelling, or pain at the  radial/ulnar (wrist) site.  · You have drainage (other than a small amount of blood on the dressing).  · You have chills or a fever > 101.  · Your arm becomes pale or dark, cool, tingly, or numb.  · You have heavy bleeding from the site, hold pressure on the site for 20 minutes.  If the bleeding stops, apply a fresh bandage and call your cardiologist.  However, if you continue to have bleeding, call 911.

## 2020-09-21 NOTE — H&P
[facility] History And Physical Assessment    Patient Name: Nito Corado  Age/Sex: 80 y.o. male  : 1940  MRN: [id number]    Date of Hospital Admission: 2020  Date of Encounter Visit: 20  Encounter Provider: Nita Kumar MD  Place of Service: [facility] CARDIOLOGY  Patient Care Team:  Jean Weaver MD as PCP - General (Family Medicine)    Subjective:     Chief Complaint:  Abnormal stress test    History of Present Illness:  See HPI from 2020.  Since then a stress test showed evidence of inferior and apical ischemia.  He continues to have dyspnea and fatigue on exertion.  Plan for cardiac catheterization today.     HPI from 2020.  This is a 79-year-old with a history of chronic atrial fibrillation, hypertension, who presents for follow-up.       He presents for follow-up today.  Since I saw him last he reports that he feels like his dyspnea on exertion and fatigue has been progressing.  He was seen by Dr. Patel in follow-up echocardiogram was ordered.  He reports that he ended up postponing this because of the COVID-19 pandemic.  He denies any chest pain, palpitations, orthopnea, near-syncope or syncope, or lower extremity edema.  He admits he really has not tried increasing his activity because he just got so out of breath with any attempt.     Prior History:  The patient was previously followed by Dr. Ramirez who he last saw in 2019.  He initially saw Dr. Ramirez in 2017 with new diagnosis of atrial fibrillation.  When he saw Dr. Ramirez at the time was the first time he had began seeing physicians again.  He was seen by Dr. Be around that time with complaints of dyspnea.  He was noted to be in atrial fibrillation with a new right bundle branch block at the time.  It was unclear how long he had been in atrial fibrillation.  It was recommended that he start metoprolol tartrate 25 mg twice a day for rate control and apixaban for  anticoagulation.  It was also set up for a stress test and an echocardiogram.  These were performed in 3/2017.  His stress test showed evidence of apical ischemia.  His echocardiogram showed normal left ventricular systolic function wall motion with an EF of 50 to 55%, moderately dilated left atrium, and moderate aortic regurgitation.       At the time Dr. Ramirez recommended proceeding with a cardioversion to see if this would help with his symptoms.  He underwent cardioversion in 3/2017 but he only lasted in sinus rhythm for about 10 minutes.  He was then placed on propafenone and known and another attempted a cardioversion was performed in 5/2017.  This time around he remained in atrial fibrillation for just about a day.  He noted no significant change in his symptoms the day he remained in sinus rhythm.  He was then evaluated by Dr. Marti in 7/2017.  When he saw Dr. Marti at that time he reported fatigue starting 2 years prior but that the shortness of breath started earlier that year.  Dr. Marti did not recommend any further attempts to obtain sinus rhythm and recommended that the patient stop propafenone and undergo a sleep evaluation for sleep apnea.  It appears that since then he has been diagnosed with sleep apnea and has been compliant with CPAP.     I saw him initially in 1/2020 at which time his symptoms were largely unchanged.  I recommended that he try and increase his activity before proceeding with further cardiac work up.     Past Medical History:  Past Medical History:   Diagnosis Date   • Allergic rhinitis    • Atrial fibrillation (CMS/HCC)    • Benign hypertension    • History of bleeding ulcers    • Marginal perforation of tympanic membrane    • Otorrhea    • PAF (paroxysmal atrial fibrillation) (CMS/HCC)        Past Surgical History:   Procedure Laterality Date   • EXTERNAL EAR SURGERY Left    • KNEE SURGERY         Home Medications:   Medications Prior to Admission   Medication Sig  Dispense Refill Last Dose   • acetaminophen (TYLENOL) 500 MG tablet Take 500 mg by mouth Every 6 (Six) Hours As Needed for Mild Pain .   2020 at Unknown time   • amlodipine-olmesartan (CAROLANN) 10-40 MG per tablet Take 1 tablet by mouth Daily.   2020 at Unknown time   • apixaban (Eliquis) 5 MG tablet tablet Take 1 tablet by mouth 2 (Two) Times a Day. 180 tablet 3 2020   • cetirizine (zyrTEC) 10 MG tablet Take 10 mg by mouth Daily.   2020 at Unknown time   • metoprolol tartrate (LOPRESSOR) 50 MG tablet Take 50 mg by mouth 2 (Two) Times a Day.  0 2020 at Unknown time   • montelukast (SINGULAIR) 10 MG tablet Take 10 mg by mouth Every Night.   2020 at Unknown time   • Fluticasone Furoate-Vilanterol (Breo Ellipta) 200-25 MCG/INH inhaler Inhale 1 puff Daily.   More than a month at Unknown time       Allergies:  No Known Allergies    Past Social History:  Social History     Socioeconomic History   • Marital status:      Spouse name: Not on file   • Number of children: Not on file   • Years of education: Not on file   • Highest education level: Not on file   Tobacco Use   • Smoking status: Former Smoker     Quit date: 1970     Years since quittin.8   • Smokeless tobacco: Former User   Substance and Sexual Activity   • Alcohol use: Not Currently   • Drug use: No   • Sexual activity: Defer       Past Family History:  Family History   Problem Relation Age of Onset   • Tuberculosis Father        Review of Systems:   All systems reviewed. Pertinent positives identified in HPI. All other systems are negative.    Objective:   Temp:  [97.1 °F (36.2 °C)] 97.1 °F (36.2 °C)  Heart Rate:  [68] 68  Resp:  [20] 20  BP: (159)/(78) 159/78  No intake or output data in the 24 hours ending 20 0854  Body mass index is 30.52 kg/m².      20  0849   Weight: 102 kg (225 lb)     Weight change:     Physical Exam:   General Appearance:    Alert, cooperative, in no acute distress   Head:     Normocephalic, without obvious abnormality, atraumatic   Eyes:            Lids and lashes normal, conjunctivae and sclerae normal, no   icterus, no pallor, corneas clear, PERRLA   Ears:    Ears appear intact with no abnormalities noted   Neck:   No adenopathy, supple, trachea midline, no thyromegaly, no   carotid bruit, no JVD   Lungs:     Clear to auscultation,respirations regular, even and unlabored    Heart:    Regular rhythm and normal rate, normal S1 and S2, no murmur, no gallop, no rub, no click   Chest Wall:    No abnormalities observed   Abdomen:     Normal bowel sounds, no masses, no organomegaly, soft        non-tender, non-distended, no guarding, no rebound  tenderness   Extremities:   Moves all extremities well, no edema, no cyanosis, no redness   Pulses:   Pulses palpable and equal bilaterally. Normal radial, carotid, femoral, dorsalis pedis and posterior tibial pulses bilaterally. Normal abdominal aorta   Skin:  Psychiatric:   No bleeding, bruising or rash    Alert and oriented x 3, normal mood and affect         Lab Review:   Results from last 7 days   Lab Units 09/18/20  1036   SODIUM mmol/L 137   POTASSIUM mmol/L 4.4   CHLORIDE mmol/L 108*   CO2 mmol/L 17.7*   BUN mg/dL 29*   CREATININE mg/dL 1.30*   GLUCOSE mg/dL 100*   CALCIUM mg/dL 9.7         Results from last 7 days   Lab Units 09/18/20  1036   WBC 10*3/mm3 6.76   HEMOGLOBIN g/dL 15.0   HEMATOCRIT % 43.9   PLATELETS 10*3/mm3 215     I personally viewed and interpreted the patient's EKG    Assessment/Plan:     1. Abnormal stress test.  With inferior and apical ischemia.  Still with symptoms of dyspnea and fatigue with exertion.    2. Chronic atrial fibrillation. Apixaban on hold.   3. Hypertension    -  For cardiac catheterization today    Nita Kumar MD  09/21/20  08:54 EDT

## 2020-10-13 ENCOUNTER — TELEPHONE (OUTPATIENT)
Dept: CARDIOLOGY | Facility: CLINIC | Age: 80
End: 2020-10-13

## 2020-10-13 NOTE — TELEPHONE ENCOUNTER
Faxed request from Dr. Tristan Shultz, requesting clearance for patient to have laproscopic cholecystectomy.    He is on eliquis and aspirin 81mg.    Patient was last seen by you on 7/17/20.  Please advise.  Thanks!

## 2020-12-15 RX ORDER — TAMSULOSIN HYDROCHLORIDE 0.4 MG/1
1 CAPSULE ORAL DAILY
COMMUNITY
Start: 2020-10-15 | End: 2020-12-18 | Stop reason: ALTCHOICE

## 2020-12-15 RX ORDER — INFLUENZA A VIRUS A/MICHIGAN/45/2015 X-275 (H1N1) ANTIGEN (FORMALDEHYDE INACTIVATED), INFLUENZA A VIRUS A/SINGAPORE/INFIMH-16-0019/2016 IVR-186 (H3N2) ANTIGEN (FORMALDEHYDE INACTIVATED), INFLUENZA B VIRUS B/PHUKET/3073/2013 ANTIGEN (FORMALDEHYDE INACTIVATED), AND INFLUENZA B VIRUS B/MARYLAND/15/2016 BX-69A ANTIGEN (FORMALDEHYDE INACTIVATED) 60; 60; 60; 60 UG/.7ML; UG/.7ML; UG/.7ML; UG/.7ML
INJECTION, SUSPENSION INTRAMUSCULAR
COMMUNITY
Start: 2020-09-10 | End: 2020-12-18

## 2020-12-18 ENCOUNTER — OFFICE VISIT (OUTPATIENT)
Dept: CARDIOLOGY | Facility: CLINIC | Age: 80
End: 2020-12-18

## 2020-12-18 VITALS
DIASTOLIC BLOOD PRESSURE: 72 MMHG | BODY MASS INDEX: 29.26 KG/M2 | WEIGHT: 216 LBS | HEART RATE: 65 BPM | SYSTOLIC BLOOD PRESSURE: 140 MMHG | HEIGHT: 72 IN

## 2020-12-18 DIAGNOSIS — R53.83 OTHER FATIGUE: ICD-10-CM

## 2020-12-18 DIAGNOSIS — Z99.89 OSA ON CPAP: ICD-10-CM

## 2020-12-18 DIAGNOSIS — G47.33 OSA ON CPAP: ICD-10-CM

## 2020-12-18 DIAGNOSIS — I10 ESSENTIAL HYPERTENSION: ICD-10-CM

## 2020-12-18 DIAGNOSIS — I48.21 PERMANENT ATRIAL FIBRILLATION (HCC): Primary | ICD-10-CM

## 2020-12-18 DIAGNOSIS — I25.10 CORONARY ARTERY DISEASE INVOLVING NATIVE CORONARY ARTERY OF NATIVE HEART WITHOUT ANGINA PECTORIS: ICD-10-CM

## 2020-12-18 DIAGNOSIS — I35.1 AORTIC VALVE INSUFFICIENCY, ETIOLOGY OF CARDIAC VALVE DISEASE UNSPECIFIED: ICD-10-CM

## 2020-12-18 PROBLEM — R94.39 ABNORMAL NUCLEAR STRESS TEST: Status: RESOLVED | Noted: 2020-09-16 | Resolved: 2020-12-18

## 2020-12-18 PROCEDURE — 99214 OFFICE O/P EST MOD 30 MIN: CPT | Performed by: INTERNAL MEDICINE

## 2020-12-18 PROCEDURE — 93000 ELECTROCARDIOGRAM COMPLETE: CPT | Performed by: INTERNAL MEDICINE

## 2020-12-18 RX ORDER — ATORVASTATIN CALCIUM 10 MG/1
10 TABLET, FILM COATED ORAL DAILY
Qty: 30 TABLET | Refills: 5 | Status: SHIPPED | OUTPATIENT
Start: 2020-12-18 | End: 2021-06-18

## 2020-12-18 NOTE — PROGRESS NOTES
Subjective:     Encounter Date:12/18/2020      Patient ID: Nito Corado is a 80 y.o. male.    Chief Complaint:  History of Present Illness    This is a 80-year-old with a history of chronic atrial fibrillation, hypertension, who presents for follow-up.       He presents for follow-up.  He was started on aspirin following his cardiac catheterization.  Since his cardiac catheterization he was admitted with acute cholecystitis.  The plan is to proceed with cholecystectomy eventually.  He continues to complain of a lot of fatigue and dyspnea on exertion.  He reports he lost a lot of muscle mass during his hospitalization.  He has been able to gain some weight by eating a lot of sweets.  He denies any chest discomfort, orthopnea, near-syncope or syncope.  He has been having some episodes of palpitations.  He is not having any bleeding issues with the addition of aspirin.     Prior History:  The patient was previously followed by Dr. Ramirez who he last saw in 5/2019.  He initially saw Dr. Ramirez in 2/2017 with new diagnosis of atrial fibrillation.  When he saw Dr. Ramirez at the time was the first time he had began seeing physicians again.  He was seen by Dr. Be around that time with complaints of dyspnea.  He was noted to be in atrial fibrillation with a new right bundle branch block at the time.  It was unclear how long he had been in atrial fibrillation.  It was recommended that he start metoprolol tartrate 25 mg twice a day for rate control and apixaban for anticoagulation.  It was also set up for a stress test and an echocardiogram.  These were performed in 3/2017.  His stress test showed evidence of apical ischemia.  His echocardiogram showed normal left ventricular systolic function wall motion with an EF of 50 to 55%, moderately dilated left atrium, and moderate aortic regurgitation.       At the time Dr. Ramirez recommended proceeding with a cardioversion to see if this would help with his symptoms.  He underwent  cardioversion in 3/2017 but he only lasted in sinus rhythm for about 10 minutes.  He was then placed on propafenone and known and another attempted a cardioversion was performed in 5/2017.  This time around he remained in atrial fibrillation for just about a day.  He noted no significant change in his symptoms the day he remained in sinus rhythm.  He was then evaluated by Dr. Marti in 7/2017.  When he saw Dr. Marti at that time he reported fatigue starting 2 years prior but that the shortness of breath started earlier that year.  Dr. Marti did not recommend any further attempts to obtain sinus rhythm and recommended that the patient stop propafenone and undergo a sleep evaluation for sleep apnea.  It appears that since then he has been diagnosed with sleep apnea and has been compliant with CPAP.     I saw him initially in 1/2020 at which time his symptoms were largely unchanged.  I recommended that he try and increase his activity before proceeding with further cardiac work up.     In follow-up in 7/2020 he reported progression in his dyspnea on exertion and fatigue.  He admitted he really had not tried increasing his activity because of the dyspnea associated with any attempt.  Following that office visit I recommended proceeding with a stress test and an echocardiogram.  He underwent both a stress test and the echocardiogram on 9/3/2020.  His echocardiogram showed normal left ventricular systolic function wall motion with an EF of 55 to 60% and evidence of severe aortic regurgitation.  Stress test showed findings of inferior and apical ischemia.  Recommended proceeding with a cardiac catheterization which was performed on 9/21/2020.  This showed mild to moderate nonobstructive coronary artery disease primarily involving a ramus intermedius branch.  FFR of this vessel was normal at 0.95.  Recommended medical management at that time.    Review of Systems   Constitution: Positive for malaise/fatigue and  weight loss.   HENT: Negative for hearing loss, hoarse voice, nosebleeds and sore throat.    Eyes: Negative for pain.   Cardiovascular: Positive for dyspnea on exertion and palpitations. Negative for chest pain, claudication, cyanosis, irregular heartbeat, leg swelling, near-syncope, orthopnea, paroxysmal nocturnal dyspnea and syncope.   Respiratory: Negative for shortness of breath and snoring.    Endocrine: Negative for cold intolerance, heat intolerance, polydipsia, polyphagia and polyuria.   Skin: Negative for itching and rash.   Musculoskeletal: Negative for arthritis, falls, joint pain, joint swelling, muscle cramps, muscle weakness and myalgias.   Gastrointestinal: Negative for constipation, diarrhea, dysphagia, heartburn, hematemesis, hematochezia, melena, nausea and vomiting.   Genitourinary: Negative for frequency, hematuria and hesitancy.   Neurological: Negative for excessive daytime sleepiness, dizziness, headaches, light-headedness, numbness and weakness.   Psychiatric/Behavioral: Negative for depression. The patient is not nervous/anxious.          Current Outpatient Medications:   •  acetaminophen (TYLENOL) 500 MG tablet, Take 500 mg by mouth Every 6 (Six) Hours As Needed for Mild Pain ., Disp: , Rfl:   •  amlodipine-olmesartan (CAROLANN) 10-40 MG per tablet, Take 1 tablet by mouth Daily., Disp: , Rfl:   •  apixaban (Eliquis) 5 MG tablet tablet, Take 1 tablet by mouth 2 (Two) Times a Day. Resume on 9/22/2020, Disp: 180 tablet, Rfl: 3  •  aspirin (aspirin) 81 MG EC tablet, Take 1 tablet by mouth Daily., Disp:  , Rfl:   •  cetirizine (zyrTEC) 10 MG tablet, Take 10 mg by mouth Daily., Disp: , Rfl:   •  Fluticasone Furoate-Vilanterol (Breo Ellipta) 200-25 MCG/INH inhaler, Inhale 1 puff Daily., Disp: , Rfl:   •  metoprolol tartrate (LOPRESSOR) 50 MG tablet, Take 50 mg by mouth 2 (Two) Times a Day., Disp: , Rfl: 0  •  montelukast (SINGULAIR) 10 MG tablet, Take 10 mg by mouth Every Night., Disp: , Rfl:     Past  "Medical History:   Diagnosis Date   • Allergic rhinitis    • Atrial fibrillation (CMS/HCC)    • Benign hypertension    • History of bleeding ulcers    • Marginal perforation of tympanic membrane    • Otorrhea    • PAF (paroxysmal atrial fibrillation) (CMS/HCC)        Past Surgical History:   Procedure Laterality Date   • CARDIAC CATHETERIZATION N/A 2020    Procedure: Coronary angiography;  Surgeon: Nita Kumar MD;  Location:  [facility];  Service: Cardiovascular;  Laterality: N/A;   • CARDIAC CATHETERIZATION N/A 2020    Procedure: Left heart cath;  Surgeon: Nita Kumar MD;  Location:  [facility];  Service: Cardiovascular;  Laterality: N/A;   • CARDIAC CATHETERIZATION N/A 2020    Procedure: Left ventriculography;  Surgeon: Nita Kumar MD;  Location:  [facility];  Service: Cardiovascular;  Laterality: N/A;   • CARDIAC CATHETERIZATION  2020    Procedure: Functional Flow [place];  Surgeon: Nita Kumar MD;  Location:  [facility];  Service: Cardiovascular;;   • EXTERNAL EAR SURGERY Left    • KNEE SURGERY         Family History   Problem Relation Age of Onset   • Tuberculosis Father    • No Known Problems Mother        Social History     Tobacco Use   • Smoking status: Former Smoker     Quit date: 1970     Years since quittin.0   • Smokeless tobacco: Former User   Substance Use Topics   • Alcohol use: Not Currently   • Drug use: No         ECG 12 Lead    Date/Time: 2020 12:45 PM  Performed by: Nita Kumar MD  Authorized by: Nita Kumar MD   Rhythm: atrial fibrillation  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 Well-developed.   Eyes:      General: Lids are normal.    "   Conjunctiva/sclera: Conjunctivae normal.      Pupils: Pupils are equal, round, and reactive to light.   HENT:      Head: Normocephalic and atraumatic.   Neck:      Musculoskeletal: Full passive range of motion without pain, normal range of motion and neck supple.      Vascular: No carotid bruit or JVD.      Lymphadenopathy: No cervical adenopathy.   Pulmonary:      Effort: Pulmonary effort is normal.      Breath sounds: Normal breath sounds.   Cardiovascular:      Normal rate. Irregularly irregular rhythm.      No gallop.   Pulses:     Radial: 2+ bilaterally.  Edema:     Peripheral edema absent.   Abdominal:      Palpations: Abdomen is soft.   Skin:     General: Skin is warm and dry.   Neurological:      Mental Status: Alert and oriented to person, place, and time.               Assessment:          Diagnosis Plan   1. Permanent atrial fibrillation (CMS/HCC)     2. Coronary artery disease involving native coronary artery of native heart without angina pectoris     3. Essential hypertension     4. MAXINE on CPAP     5. Other fatigue            Plan:       1.  Dyspnea/fatigue.   2.  Aortic regurgitation  3.  Coronary artery disease.  Primarily moderate single-vessel nonobstructive disease.  On aspirin.  We will go ahead and start a low-dose of atorvastatin 10 mg daily.  4.  Hypertension.  Well-controlled on his current medications.  5.  Obstructive sleep apnea.  Compliant with CPAP.  6.  Chronic atrial fibrillation.  Rate controlled on metoprolol.  He is on chronic anticoagulation with apixaban.  7.  Cholecystitis    We will further review the echocardiogram images and aortic valve regurgitation and determine if further work-up of this is needed.    ADDENDUM (12/28/2020):  I reviewed the echocardiographic images with Dr. Dowling as a second opinion last week.  She agrees that the aortic valve regurgitation is at least moderate but there is some concern that it is more in the severe range.  Fortunately however his left  ventricular size and function remains normal.  She recommended that we arrange for a limited echocardiogram to further evaluate the aortic valve at the main office.  I called the patient's wife today and discussed this recommendation.  She reports that she will need to arrange for transportation.  I will go ahead and place an order.  I recommended that we get this done in the next couple of weeks.

## 2021-01-18 ENCOUNTER — HOSPITAL ENCOUNTER (OUTPATIENT)
Dept: CARDIOLOGY | Facility: HOSPITAL | Age: 81
Discharge: HOME | End: 2021-01-18
Admitting: INTERNAL MEDICINE

## 2021-01-18 VITALS
SYSTOLIC BLOOD PRESSURE: 120 MMHG | HEIGHT: 72 IN | BODY MASS INDEX: 29.26 KG/M2 | DIASTOLIC BLOOD PRESSURE: 80 MMHG | OXYGEN SATURATION: 95 % | WEIGHT: 216 LBS | HEART RATE: 62 BPM

## 2021-01-18 DIAGNOSIS — I35.1 AORTIC VALVE INSUFFICIENCY, ETIOLOGY OF CARDIAC VALVE DISEASE UNSPECIFIED: ICD-10-CM

## 2021-01-18 PROCEDURE — 93308 TTE F-UP OR LMTD: CPT | Performed by: INTERNAL MEDICINE

## 2021-01-18 PROCEDURE — 93321 DOPPLER ECHO F-UP/LMTD STD: CPT | Performed by: INTERNAL MEDICINE

## 2021-01-18 PROCEDURE — 93321 DOPPLER ECHO F-UP/LMTD STD: CPT

## 2021-01-18 PROCEDURE — 93325 DOPPLER ECHO COLOR FLOW MAPG: CPT

## 2021-01-18 PROCEDURE — 93308 TTE F-UP OR LMTD: CPT

## 2021-01-18 PROCEDURE — 93325 DOPPLER ECHO COLOR FLOW MAPG: CPT | Performed by: INTERNAL MEDICINE

## 2021-01-19 LAB
BH CV ECHO MEAS - ACS: 2.4 CM
BH CV ECHO MEAS - AI DEC SLOPE: 258.7 CM/SEC^2
BH CV ECHO MEAS - AI MAX PG: 84.8 MMHG
BH CV ECHO MEAS - AI MAX VEL: 460.4 CM/SEC
BH CV ECHO MEAS - AI P1/2T: 521.2 MSEC
BH CV ECHO MEAS - AO MAX PG (FULL): 4.2 MMHG
BH CV ECHO MEAS - AO MAX PG: 12.7 MMHG
BH CV ECHO MEAS - AO MEAN PG (FULL): 2.3 MMHG
BH CV ECHO MEAS - AO MEAN PG: 6.9 MMHG
BH CV ECHO MEAS - AO ROOT AREA (BSA CORRECTED): 1.4
BH CV ECHO MEAS - AO ROOT AREA: 7.8 CM^2
BH CV ECHO MEAS - AO ROOT DIAM: 3.2 CM
BH CV ECHO MEAS - AO V2 MAX: 178.1 CM/SEC
BH CV ECHO MEAS - AO V2 MEAN: 122.8 CM/SEC
BH CV ECHO MEAS - AO V2 VTI: 39.3 CM
BH CV ECHO MEAS - AVA(I,A): 3.2 CM^2
BH CV ECHO MEAS - AVA(I,D): 3.2 CM^2
BH CV ECHO MEAS - AVA(V,A): 3.3 CM^2
BH CV ECHO MEAS - AVA(V,D): 3.3 CM^2
BH CV ECHO MEAS - BSA(HAYCOCK): 2.3 M^2
BH CV ECHO MEAS - BSA: 2.2 M^2
BH CV ECHO MEAS - BZI_BMI: 29.3 KILOGRAMS/M^2
BH CV ECHO MEAS - BZI_METRIC_HEIGHT: 182.9 CM
BH CV ECHO MEAS - BZI_METRIC_WEIGHT: 98 KG
BH CV ECHO MEAS - EDV(MOD-SP2): 74 ML
BH CV ECHO MEAS - EDV(MOD-SP4): 89 ML
BH CV ECHO MEAS - EDV(TEICH): 127.8 ML
BH CV ECHO MEAS - EF(CUBED): 64 %
BH CV ECHO MEAS - EF(MOD-BP): 59.4 %
BH CV ECHO MEAS - EF(MOD-SP2): 56.8 %
BH CV ECHO MEAS - EF(MOD-SP4): 61.8 %
BH CV ECHO MEAS - EF(TEICH): 55.2 %
BH CV ECHO MEAS - ESV(MOD-SP2): 32 ML
BH CV ECHO MEAS - ESV(MOD-SP4): 34 ML
BH CV ECHO MEAS - ESV(TEICH): 57.3 ML
BH CV ECHO MEAS - FS: 28.9 %
BH CV ECHO MEAS - IVS/LVPW: 1.1
BH CV ECHO MEAS - IVSD: 1.4 CM
BH CV ECHO MEAS - LV DIASTOLIC VOL/BSA (35-75): 40.4 ML/M^2
BH CV ECHO MEAS - LV MASS(C)D: 307.1 GRAMS
BH CV ECHO MEAS - LV MASS(C)DI: 139.5 GRAMS/M^2
BH CV ECHO MEAS - LV MAX PG: 8.5 MMHG
BH CV ECHO MEAS - LV MEAN PG: 4.5 MMHG
BH CV ECHO MEAS - LV SYSTOLIC VOL/BSA (12-30): 15.4 ML/M^2
BH CV ECHO MEAS - LV V1 MAX: 145.5 CM/SEC
BH CV ECHO MEAS - LV V1 MEAN: 100.7 CM/SEC
BH CV ECHO MEAS - LV V1 VTI: 31.2 CM
BH CV ECHO MEAS - LVIDD: 5.2 CM
BH CV ECHO MEAS - LVIDS: 3.7 CM
BH CV ECHO MEAS - LVLD AP2: 8.1 CM
BH CV ECHO MEAS - LVLD AP4: 8.8 CM
BH CV ECHO MEAS - LVLS AP2: 8.3 CM
BH CV ECHO MEAS - LVLS AP4: 7.6 CM
BH CV ECHO MEAS - LVOT AREA (M): 4.2 CM^2
BH CV ECHO MEAS - LVOT AREA: 4.1 CM^2
BH CV ECHO MEAS - LVOT DIAM: 2.3 CM
BH CV ECHO MEAS - LVPWD: 1.4 CM
BH CV ECHO MEAS - SI(AO): 139.5 ML/M^2
BH CV ECHO MEAS - SI(CUBED): 40.2 ML/M^2
BH CV ECHO MEAS - SI(LVOT): 57.9 ML/M^2
BH CV ECHO MEAS - SI(MOD-SP2): 19.1 ML/M^2
BH CV ECHO MEAS - SI(MOD-SP4): 25 ML/M^2
BH CV ECHO MEAS - SI(TEICH): 32 ML/M^2
BH CV ECHO MEAS - SUP REN AO DIAM: 2.2 CM
BH CV ECHO MEAS - SV(AO): 307.1 ML
BH CV ECHO MEAS - SV(CUBED): 88.5 ML
BH CV ECHO MEAS - SV(LVOT): 127.5 ML
BH CV ECHO MEAS - SV(MOD-SP2): 42 ML
BH CV ECHO MEAS - SV(MOD-SP4): 55 ML
BH CV ECHO MEAS - SV(TEICH): 70.5 ML
BH CV VAS BP RIGHT ARM: NORMAL MMHG
LV EF 2D ECHO EST: 59 %
MAXIMAL PREDICTED HEART RATE: 142 BPM
STRESS TARGET HR: 121 BPM

## 2021-02-01 ENCOUNTER — ANTICOAGULATION VISIT (OUTPATIENT)
Dept: PHARMACY | Facility: HOSPITAL | Age: 81
End: 2021-02-01

## 2021-02-17 ENCOUNTER — ANTICOAGULATION VISIT (OUTPATIENT)
Dept: PHARMACY | Facility: HOSPITAL | Age: 81
End: 2021-02-17

## 2021-06-18 ENCOUNTER — OFFICE VISIT (OUTPATIENT)
Dept: CARDIOLOGY | Facility: CLINIC | Age: 81
End: 2021-06-18

## 2021-06-18 VITALS
BODY MASS INDEX: 28.94 KG/M2 | HEART RATE: 65 BPM | HEIGHT: 72 IN | SYSTOLIC BLOOD PRESSURE: 132 MMHG | DIASTOLIC BLOOD PRESSURE: 72 MMHG | WEIGHT: 213.7 LBS

## 2021-06-18 DIAGNOSIS — R53.83 OTHER FATIGUE: ICD-10-CM

## 2021-06-18 DIAGNOSIS — I10 ESSENTIAL HYPERTENSION: ICD-10-CM

## 2021-06-18 DIAGNOSIS — I48.21 PERMANENT ATRIAL FIBRILLATION (HCC): ICD-10-CM

## 2021-06-18 DIAGNOSIS — Z99.89 OSA ON CPAP: ICD-10-CM

## 2021-06-18 DIAGNOSIS — I25.10 CORONARY ARTERY DISEASE INVOLVING NATIVE CORONARY ARTERY OF NATIVE HEART WITHOUT ANGINA PECTORIS: Primary | ICD-10-CM

## 2021-06-18 DIAGNOSIS — G47.33 OSA ON CPAP: ICD-10-CM

## 2021-06-18 PROCEDURE — 99214 OFFICE O/P EST MOD 30 MIN: CPT | Performed by: INTERNAL MEDICINE

## 2021-06-18 PROCEDURE — 93000 ELECTROCARDIOGRAM COMPLETE: CPT | Performed by: INTERNAL MEDICINE

## 2021-06-18 NOTE — PROGRESS NOTES
Subjective:     Encounter Date:06/18/2021      Patient ID: Nito Corado is a 80 y.o. male.    Chief Complaint:  History of Present Illness    This is a 80-year-old with a history of chronic atrial fibrillation, hypertension, who presents for follow-up.       At his last office visit in 12/2020 patient continued to complain of dyspnea on exertion and fatigue.  I rereviewed his echocardiographic images with Dr. Dowling who agreed that his aortic valve regurgitation was at least moderate and could be severe.  We decided to repeat echocardiogram with limited images at the main office.  This was performed on 1/18/2021 and showed mild aortic regurgitation with normal left ventricular systolic function and an EF of 59%.    Since his last office visit the patient has developed issues with muscle pain.  He continues to have issues with dyspnea on exertion and fatigue which is largely unchanged.  Due to the muscle pain he ended up stopping the low-dose atorvastatin which I started at his last office visit.  Overall his muscle symptoms have improved but is unclear if this is related to continuation of the atorvastatin.  He is being evaluated by Dr. Ash for his symptoms who recommended proceeding with an nerve study.     He otherwise denies any chest pain, palpitations, orthopnea, near-syncope or syncope.    Prior History:  The patient was previously followed by Dr. Ramirez who he last saw in 5/2019.  He initially saw Dr. Ramirez in 2/2017 with new diagnosis of atrial fibrillation.  When he saw Dr. Ramirez at the time was the first time he had began seeing physicians again.  He was seen by Dr. Be around that time with complaints of dyspnea.  He was noted to be in atrial fibrillation with a new right bundle branch block at the time.  It was unclear how long he had been in atrial fibrillation.  It was recommended that he start metoprolol tartrate 25 mg twice a day for rate control and apixaban for anticoagulation.  It was also  set up for a stress test and an echocardiogram.  These were performed in 3/2017.  His stress test showed evidence of apical ischemia.  His echocardiogram showed normal left ventricular systolic function wall motion with an EF of 50 to 55%, moderately dilated left atrium, and moderate aortic regurgitation.       At the time Dr. Ramirez recommended proceeding with a cardioversion to see if this would help with his symptoms.  He underwent cardioversion in 3/2017 but he only lasted in sinus rhythm for about 10 minutes.  He was then placed on propafenone and known and another attempted a cardioversion was performed in 5/2017.  This time around he remained in atrial fibrillation for just about a day.  He noted no significant change in his symptoms the day he remained in sinus rhythm.  He was then evaluated by Dr. Marti in 7/2017.  When he saw Dr. Marti at that time he reported fatigue starting 2 years prior but that the shortness of breath started earlier that year.  Dr. Marti did not recommend any further attempts to obtain sinus rhythm and recommended that the patient stop propafenone and undergo a sleep evaluation for sleep apnea.  It appears that since then he has been diagnosed with sleep apnea and has been compliant with CPAP.     I saw him initially in 1/2020 at which time his symptoms were largely unchanged.  I recommended that he try and increase his activity before proceeding with further cardiac work up.      In follow-up in 7/2020 he reported progression in his dyspnea on exertion and fatigue.  He admitted he really had not tried increasing his activity because of the dyspnea associated with any attempt.  Following that office visit I recommended proceeding with a stress test and an echocardiogram.  He underwent both a stress test and the echocardiogram on 9/3/2020.  His echocardiogram showed normal left ventricular systolic function wall motion with an EF of 55 to 60% and evidence of severe aortic  regurgitation.  Stress test showed findings of inferior and apical ischemia.  Recommended proceeding with a cardiac catheterization which was performed on 9/21/2020.  This showed mild to moderate nonobstructive coronary artery disease primarily involving a ramus intermedius branch.  FFR of this vessel was normal at 0.95.  Recommended medical management at that time.    Review of Systems   Constitutional: Positive for malaise/fatigue.   HENT: Negative for hearing loss, hoarse voice, nosebleeds and sore throat.    Eyes: Negative for pain.   Cardiovascular: Positive for dyspnea on exertion. Negative for chest pain, claudication, cyanosis, irregular heartbeat, leg swelling, near-syncope, orthopnea, palpitations, paroxysmal nocturnal dyspnea and syncope.   Respiratory: Negative for shortness of breath and snoring.    Endocrine: Negative for cold intolerance, heat intolerance, polydipsia, polyphagia and polyuria.   Skin: Negative for itching and rash.   Musculoskeletal: Positive for myalgias. Negative for arthritis, falls, joint pain, joint swelling, muscle cramps and muscle weakness.   Gastrointestinal: Negative for constipation, diarrhea, dysphagia, heartburn, hematemesis, hematochezia, melena, nausea and vomiting.   Genitourinary: Negative for frequency, hematuria and hesitancy.   Neurological: Negative for excessive daytime sleepiness, dizziness, headaches, light-headedness, numbness and weakness.   Psychiatric/Behavioral: Negative for depression. The patient is not nervous/anxious.          Current Outpatient Medications:   •  amlodipine-olmesartan (CAROLANN) 10-40 MG per tablet, Take 1 tablet by mouth Daily., Disp: , Rfl:   •  apixaban (Eliquis) 5 MG tablet tablet, Take 1 tablet by mouth 2 (Two) Times a Day. Resume on 9/22/2020, Disp: 180 tablet, Rfl: 3  •  aspirin (aspirin) 81 MG EC tablet, Take 1 tablet by mouth Daily., Disp:  , Rfl:   •  cetirizine (zyrTEC) 10 MG tablet, Take 10 mg by mouth Daily., Disp: , Rfl:   •   Fluticasone Furoate-Vilanterol (Breo Ellipta) 200-25 MCG/INH inhaler, Inhale 1 puff Daily., Disp: , Rfl:   •  metoprolol tartrate (LOPRESSOR) 50 MG tablet, Take 50 mg by mouth 2 (Two) Times a Day., Disp: , Rfl: 0  •  montelukast (SINGULAIR) 10 MG tablet, Take 10 mg by mouth Every Night., Disp: , Rfl:     Past Medical History:   Diagnosis Date   • Allergic rhinitis    • Atrial fibrillation (CMS/HCC)    • Benign hypertension    • History of bleeding ulcers    • Marginal perforation of tympanic membrane    • Otorrhea    • PAF (paroxysmal atrial fibrillation) (CMS/HCC)        Past Surgical History:   Procedure Laterality Date   • CARDIAC CATHETERIZATION N/A 2020    Procedure: Coronary angiography;  Surgeon: Nita Kumar MD;  Location:  [facility];  Service: Cardiovascular;  Laterality: N/A;   • CARDIAC CATHETERIZATION N/A 2020    Procedure: Left heart cath;  Surgeon: Nita Kumar MD;  Location:  [facility];  Service: Cardiovascular;  Laterality: N/A;   • CARDIAC CATHETERIZATION N/A 2020    Procedure: Left ventriculography;  Surgeon: Nita Kumar MD;  Location:  [facility];  Service: Cardiovascular;  Laterality: N/A;   • CARDIAC CATHETERIZATION  2020    Procedure: Functional Flow [place];  Surgeon: Nita Kumar MD;  Location:  [facility];  Service: Cardiovascular;;   • EXTERNAL EAR SURGERY Left    • KNEE SURGERY         Family History   Problem Relation Age of Onset   • Tuberculosis Father    • No Known Problems Mother        Social History     Tobacco Use   • Smoking status: Former Smoker     Quit date: 1970     Years since quittin.5   • Smokeless tobacco: Former User   Substance Use Topics   • Alcohol use: Not Currently   • Drug use: No         ECG 12 Lead    Date/Time: 2021 12:57 PM  Performed by: Nita Kumar MD  Authorized by: Nita Kumar MD   Comparison: compared with previous ECG  "  Similar to previous ECG  Rhythm: atrial fibrillation  Conduction: right bundle branch block               Objective:     Visit Vitals  /72   Pulse 65   Ht 182.9 cm (72\")   Wt 96.9 kg (213 lb 11.2 oz)   BMI 28.98 kg/m²         Constitutional:       Appearance: Normal appearance. Well-developed.   Eyes:      General: Lids are normal.      Conjunctiva/sclera: Conjunctivae normal.      Pupils: Pupils are equal, round, and reactive to light.   HENT:      Head: Normocephalic and atraumatic.   Neck:      Vascular: No carotid bruit or JVD.      Lymphadenopathy: No cervical adenopathy.   Pulmonary:      Effort: Pulmonary effort is normal.      Breath sounds: Normal breath sounds.   Cardiovascular:      Normal rate. Irregularly irregular rhythm.      No gallop.   Pulses:     Radial: 2+ bilaterally.  Edema:     Peripheral edema absent.   Abdominal:      Palpations: Abdomen is soft.   Musculoskeletal:      Cervical back: Full passive range of motion without pain, normal range of motion and neck supple. Skin:     General: Skin is warm and dry.   Neurological:      Mental Status: Alert and oriented to person, place, and time.             Assessment:          Diagnosis Plan   1. Coronary artery disease involving native coronary artery of native heart without angina pectoris     2. Permanent atrial fibrillation (CMS/HCC)     3. Essential hypertension     4. MAXINE on CPAP     5. Other fatigue            Plan:         1.  Coronary artery disease.  Moderate nonobstructive disease involving his ramus branch.  He is on aspirin which he is tolerating well so far.  Atorvastatin was stopped after he developed issues with myalgias.  We will hold off on trying any other statin therapy until his work-up for his myalgias is completed.  2.  Hypertension.  Well-controlled on his current regimen medications.  3.  Aortic regurgitation.  Mild on repeat limited echocardiogram.  4.  Chronic atrial fibrillation.  Rate controlled on metoprolol.  " He remains on chronic anticoagulation with apixaban.  5.  Obstructive sleep apnea.  Compliant with CPAP.    I will plan on seeing the patient back again in 6 months.

## 2021-06-25 RX ORDER — APIXABAN 5 MG/1
TABLET, FILM COATED ORAL
Qty: 180 TABLET | Refills: 1 | Status: SHIPPED | OUTPATIENT
Start: 2021-06-25 | End: 2022-01-05

## 2021-06-25 NOTE — TELEPHONE ENCOUNTER
Last OV 6/18/21.  Next OV 12/17/21.  Labs 09/18/20.  [facility]

## 2022-01-05 RX ORDER — APIXABAN 5 MG/1
TABLET, FILM COATED ORAL
Qty: 180 TABLET | Refills: 1 | Status: SHIPPED | OUTPATIENT
Start: 2022-01-05 | End: 2022-07-25

## 2022-04-08 ENCOUNTER — OFFICE VISIT (OUTPATIENT)
Dept: CARDIOLOGY | Facility: CLINIC | Age: 82
End: 2022-04-08

## 2022-04-08 VITALS
SYSTOLIC BLOOD PRESSURE: 132 MMHG | HEART RATE: 68 BPM | HEIGHT: 72 IN | DIASTOLIC BLOOD PRESSURE: 78 MMHG | BODY MASS INDEX: 29.53 KG/M2 | WEIGHT: 218 LBS

## 2022-04-08 DIAGNOSIS — I10 ESSENTIAL HYPERTENSION: ICD-10-CM

## 2022-04-08 DIAGNOSIS — Z99.89 OSA ON CPAP: ICD-10-CM

## 2022-04-08 DIAGNOSIS — G47.33 OSA ON CPAP: ICD-10-CM

## 2022-04-08 DIAGNOSIS — I25.10 CORONARY ARTERY DISEASE INVOLVING NATIVE CORONARY ARTERY OF NATIVE HEART WITHOUT ANGINA PECTORIS: Primary | ICD-10-CM

## 2022-04-08 DIAGNOSIS — R53.83 OTHER FATIGUE: ICD-10-CM

## 2022-04-08 DIAGNOSIS — I48.21 PERMANENT ATRIAL FIBRILLATION: ICD-10-CM

## 2022-04-08 DIAGNOSIS — R06.09 DYSPNEA ON EXERTION: ICD-10-CM

## 2022-04-08 PROCEDURE — 99214 OFFICE O/P EST MOD 30 MIN: CPT | Performed by: INTERNAL MEDICINE

## 2022-04-08 PROCEDURE — 93000 ELECTROCARDIOGRAM COMPLETE: CPT | Performed by: INTERNAL MEDICINE

## 2022-04-08 NOTE — PROGRESS NOTES
Subjective:     Encounter Date:04/08/2022      Patient ID: Nito Corado is a 81 y.o. male.    Chief Complaint:  History of Present Illness    This is a 81-year-old with a history of chronic atrial fibrillation, hypertension, nonobstructive coronary artery disease, who presents for follow-up.       The patient presents today for routine follow-up.  He has undergone work-up with Dr. Piper for his muscle and joint discomfort that apparently was unremarkable.  He had further issues with joint issues and was given a steroid course with improvement of symptoms.  After best number of things done a rheumatologic work-up and suspects he may be suffering from.    He continues to have episodes of shortness of breath which mainly occurs at rest.  He admits that he is not very active but that he will likely have dyspnea on exertion when he increase his activity with the improvement in the weather.  Overall he feels like his breathing is stable.  He denies any chest pain, palpitations, orthopnea, near-syncope or syncope, or lower extremity edema.     Prior History:  The patient was previously followed by Dr. Ramirez who he last saw in 5/2019.  He initially saw Dr. Ramirez in 2/2017 with new diagnosis of atrial fibrillation.  When he saw Dr. Ramirez at the time was the first time he had began seeing physicians again.  He was seen by Dr. Be around that time with complaints of dyspnea.  He was noted to be in atrial fibrillation with a new right bundle branch block at the time.  It was unclear how long he had been in atrial fibrillation.  It was recommended that he start metoprolol tartrate 25 mg twice a day for rate control and apixaban for anticoagulation.  It was also set up for a stress test and an echocardiogram.  These were performed in 3/2017.  His stress test showed evidence of apical ischemia.  His echocardiogram showed normal left ventricular systolic function wall motion with an EF of 50 to 55%, moderately dilated left  atrium, and moderate aortic regurgitation.       At the time Dr. Ramirez recommended proceeding with a cardioversion to see if this would help with his symptoms.  He underwent cardioversion in 3/2017 but he only lasted in sinus rhythm for about 10 minutes.  He was then placed on propafenone and known and another attempted a cardioversion was performed in 5/2017.  This time around he remained in atrial fibrillation for just about a day.  He noted no significant change in his symptoms the day he remained in sinus rhythm.  He was then evaluated by Dr. Marti in 7/2017.  When he saw Dr. Marti at that time he reported fatigue starting 2 years prior but that the shortness of breath started earlier that year.  Dr. Marti did not recommend any further attempts to obtain sinus rhythm and recommended that the patient stop propafenone and undergo a sleep evaluation for sleep apnea.  It appears that since then he has been diagnosed with sleep apnea and has been compliant with CPAP.     I saw him initially in 1/2020 at which time his symptoms were largely unchanged.  I recommended that he try and increase his activity before proceeding with further cardiac work up.      In follow-up in 7/2020 he reported progression in his dyspnea on exertion and fatigue.  He admitted he really had not tried increasing his activity because of the dyspnea associated with any attempt.  Following that office visit I recommended proceeding with a stress test and an echocardiogram.  He underwent both a stress test and the echocardiogram on 9/3/2020.  His echocardiogram showed normal left ventricular systolic function wall motion with an EF of 55 to 60% and evidence of severe aortic regurgitation.  Stress test showed findings of inferior and apical ischemia.  Recommended proceeding with a cardiac catheterization which was performed on 9/21/2020.  This showed mild to moderate nonobstructive coronary artery disease primarily involving a ramus  intermedius branch.  FFR of this vessel was normal at 0.95.  Recommended medical management at that time.    In 12/2020 patient continued to complain of dyspnea on exertion and fatigue.  I rereviewed his echocardiographic images with Dr. Dowling who agreed that his aortic valve regurgitation was at least moderate and could be severe.  We decided to repeat echocardiogram with limited images at the main office.  This was performed on 1/18/2021 and showed mild aortic regurgitation with normal left ventricular systolic function and an EF of 59%.     In follow-up the patient developed issues with muscle and joint pain.  Ended up stopping the low-dose atorvastatin to see if this was contributing to her symptoms.  In follow-up he reported improvement in the symptoms but it was unclear if he is related to the discontinuation of atorvastatin.    Review of Systems   Constitutional: Negative for malaise/fatigue.   HENT: Negative for hearing loss, hoarse voice, nosebleeds and sore throat.    Eyes: Negative for pain.   Cardiovascular: Positive for dyspnea on exertion. Negative for chest pain, claudication, cyanosis, irregular heartbeat, leg swelling, near-syncope, orthopnea, palpitations, paroxysmal nocturnal dyspnea and syncope.   Respiratory: Positive for shortness of breath. Negative for snoring.    Endocrine: Negative for cold intolerance, heat intolerance, polydipsia, polyphagia and polyuria.   Skin: Negative for itching and rash.   Musculoskeletal: Positive for joint pain and joint swelling. Negative for arthritis, falls, muscle cramps, muscle weakness and myalgias.   Gastrointestinal: Negative for constipation, diarrhea, dysphagia, heartburn, hematemesis, hematochezia, melena, nausea and vomiting.   Genitourinary: Negative for frequency, hematuria and hesitancy.   Neurological: Negative for excessive daytime sleepiness, dizziness, headaches, light-headedness, numbness and weakness.   Psychiatric/Behavioral: Negative for  depression. The patient is not nervous/anxious.          Current Outpatient Medications:   •  amlodipine-olmesartan (CAROLANN) 10-40 MG per tablet, Take 1 tablet by mouth Daily., Disp: , Rfl:   •  aspirin (aspirin) 81 MG EC tablet, Take 1 tablet by mouth Daily., Disp:  , Rfl:   •  cetirizine (zyrTEC) 10 MG tablet, Take 10 mg by mouth Daily., Disp: , Rfl:   •  Eliquis 5 MG tablet tablet, Take 1 tablet by mouth twice daily, Disp: 180 tablet, Rfl: 1  •  Fluticasone Furoate-Vilanterol (Breo Ellipta) 200-25 MCG/INH inhaler, Inhale 1 puff Daily., Disp: , Rfl:   •  metoprolol tartrate (LOPRESSOR) 50 MG tablet, Take 50 mg by mouth 2 (Two) Times a Day., Disp: , Rfl: 0  •  montelukast (SINGULAIR) 10 MG tablet, Take 10 mg by mouth Every Night., Disp: , Rfl:     Past Medical History:   Diagnosis Date   • Allergic rhinitis    • Atrial fibrillation (CMS/HCC)    • Benign hypertension    • History of bleeding ulcers    • Marginal perforation of tympanic membrane    • Otorrhea    • PAF (paroxysmal atrial fibrillation) (CMS/HCC)        Past Surgical History:   Procedure Laterality Date   • CARDIAC CATHETERIZATION N/A 9/21/2020    Procedure: Coronary angiography;  Surgeon: Nita Kumar MD;  Location: [facility] CATH INVASIVE LOCATION;  Service: Cardiovascular;  Laterality: N/A;   • CARDIAC CATHETERIZATION N/A 9/21/2020    Procedure: Left heart cath;  Surgeon: Nita Kumar MD;  Location: [facility] CATH INVASIVE LOCATION;  Service: Cardiovascular;  Laterality: N/A;   • CARDIAC CATHETERIZATION N/A 9/21/2020    Procedure: Left ventriculography;  Surgeon: Nita Kumar MD;  Location:  [facility] CATH INVASIVE LOCATION;  Service: Cardiovascular;  Laterality: N/A;   • CARDIAC CATHETERIZATION  9/21/2020    Procedure: Functional Flow [place];  Surgeon: Nita Kumar MD;  Location:  [facility] CATH INVASIVE LOCATION;  Service: Cardiovascular;;   • EXTERNAL EAR SURGERY Left    • KNEE SURGERY         Family History   Problem Relation Age of Onset   •  "Tuberculosis Father    • No Known Problems Mother        Social History     Tobacco Use   • Smoking status: Former Smoker     Quit date: 1970     Years since quittin.3   • Smokeless tobacco: Former User   Substance Use Topics   • Alcohol use: Not Currently   • Drug use: No         ECG 12 Lead    Date/Time: 2022 10:59 AM  Performed by: Nita Kumar MD  Authorized by: Nita Kumar MD   Comparison: compared with previous ECG   Similar to previous ECG  Rhythm: atrial fibrillation  Conduction: left anterior fascicular block               Objective:     Visit Vitals  /78   Pulse 68   Ht 182.9 cm (72\")   Wt 98.9 kg (218 lb)   BMI 29.57 kg/m²         Constitutional:       Appearance: Normal appearance. Well-developed.   HENT:      Head: Normocephalic and atraumatic.   Neck:      Vascular: No carotid bruit or JVD.   Pulmonary:      Effort: Pulmonary effort is normal.      Breath sounds: Normal breath sounds.   Cardiovascular:      Normal rate. Regular rhythm.      No gallop.   Pulses:     Radial: 2+ bilaterally.  Edema:     Peripheral edema absent.   Abdominal:      Palpations: Abdomen is soft.   Skin:     General: Skin is warm and dry.   Neurological:      Mental Status: Alert and oriented to person, place, and time.             Assessment:          Diagnosis Plan   1. Coronary artery disease involving native coronary artery of native heart without angina pectoris     2. Permanent atrial fibrillation (HCC)     3. Essential hypertension     4. MAXINE on CPAP     5. Dyspnea on exertion     6. Other fatigue            Plan:       1.  Chronic atrial fibrillation.  Rate controlled on metoprolol.  On chronic anticoagulation with apixaban which he is tolerating.  Continue the same.  2.  Nonobstructive coronary artery disease.  No EKG changes or symptoms to suggest progression.  He is on low-dose aspirin.  He is still off of the statin.  We will keep him off of the statin for now until his joint and muscle " issues have been sorted out.  3.  Hypertension.  Well-controlled on his current regimen of medications.  4.  Obstructive sleep apnea.  On CPAP.  5.  Chronic shortness of breath.  Largely stable.  6.  Myalgias/joint discomfort.  Plans for rheumatologic work-up later this month noted.    I will see the patient back again in 6 months.

## 2022-07-25 RX ORDER — APIXABAN 5 MG/1
TABLET, FILM COATED ORAL
Qty: 180 TABLET | Refills: 3 | Status: SHIPPED | OUTPATIENT
Start: 2022-07-25

## 2022-10-31 RX ORDER — SULFASALAZINE 500 MG/1
500 TABLET ORAL
COMMUNITY
Start: 2022-10-06

## 2022-10-31 RX ORDER — NIRMATRELVIR AND RITONAVIR 300-100 MG
KIT ORAL
COMMUNITY
Start: 2022-08-29 | End: 2022-12-02 | Stop reason: ALTCHOICE

## 2022-10-31 RX ORDER — OMEPRAZOLE 20 MG/1
CAPSULE, DELAYED RELEASE ORAL
COMMUNITY
Start: 2022-10-06

## 2022-11-16 ENCOUNTER — TELEPHONE (OUTPATIENT)
Dept: CARDIOLOGY | Facility: CLINIC | Age: 82
End: 2022-11-16

## 2022-12-02 ENCOUNTER — OFFICE VISIT (OUTPATIENT)
Dept: CARDIOLOGY | Facility: CLINIC | Age: 82
End: 2022-12-02

## 2022-12-02 VITALS
BODY MASS INDEX: 27.09 KG/M2 | HEART RATE: 77 BPM | OXYGEN SATURATION: 97 % | WEIGHT: 200 LBS | DIASTOLIC BLOOD PRESSURE: 92 MMHG | SYSTOLIC BLOOD PRESSURE: 150 MMHG | HEIGHT: 72 IN

## 2022-12-02 DIAGNOSIS — Z99.89 OSA ON CPAP: ICD-10-CM

## 2022-12-02 DIAGNOSIS — I48.21 PERMANENT ATRIAL FIBRILLATION: ICD-10-CM

## 2022-12-02 DIAGNOSIS — R06.09 DYSPNEA ON EXERTION: ICD-10-CM

## 2022-12-02 DIAGNOSIS — I10 ESSENTIAL HYPERTENSION: ICD-10-CM

## 2022-12-02 DIAGNOSIS — G47.33 OSA ON CPAP: ICD-10-CM

## 2022-12-02 DIAGNOSIS — I35.1 NONRHEUMATIC AORTIC VALVE INSUFFICIENCY: ICD-10-CM

## 2022-12-02 DIAGNOSIS — I25.10 CORONARY ARTERY DISEASE INVOLVING NATIVE CORONARY ARTERY OF NATIVE HEART WITHOUT ANGINA PECTORIS: Primary | ICD-10-CM

## 2022-12-02 PROCEDURE — 93000 ELECTROCARDIOGRAM COMPLETE: CPT | Performed by: INTERNAL MEDICINE

## 2022-12-02 PROCEDURE — 99214 OFFICE O/P EST MOD 30 MIN: CPT | Performed by: INTERNAL MEDICINE

## 2022-12-02 RX ORDER — NITROFURANTOIN 25; 75 MG/1; MG/1
100 CAPSULE ORAL DAILY
COMMUNITY
Start: 2022-11-29

## 2022-12-02 RX ORDER — GUAIFENESIN 600 MG/1
1200 TABLET, EXTENDED RELEASE ORAL 2 TIMES DAILY
COMMUNITY

## 2022-12-02 RX ORDER — LEVOFLOXACIN 500 MG/1
500 TABLET, FILM COATED ORAL EVERY MORNING
COMMUNITY
Start: 2022-10-19

## 2022-12-02 RX ORDER — CEPHALEXIN 500 MG/1
500 CAPSULE ORAL DAILY
COMMUNITY
Start: 2022-10-11

## 2022-12-02 RX ORDER — PREDNISONE 1 MG/1
10 TABLET ORAL DAILY
COMMUNITY
Start: 2022-11-23

## 2022-12-02 NOTE — PROGRESS NOTES
Subjective:     Encounter Date:12/02/2022      Patient ID: Nito Corado is a 82 y.o. male.    Chief Complaint:  History of Present Illness    This is a 82-year-old with a history of chronic atrial fibrillation, hypertension, nonobstructive coronary artery disease, who presents for follow-up.       Patient presents today for follow-up.  Since I saw him last he has been diagnosed with rheumatoid.  He is currently on prednisone therapy.  There are some plans to start him on therapy per his rheumatologist but he required a bronchoscopy first which was performed about a month ago.  He is supposed to have a repeat chest x-ray performed before he can be cleared to undergo further therapy for his rheumatoid arthritis.  He is having a lot of issues with just generalized fatigue.  The patient's wife does not think the rheumatologist thinks is possible that some of his symptoms are due to his rheumatoid arthritis and/or chronic prednisone use.  He does not believe report any worsening dyspnea on exertion.  Denies any chest pain, palpitations, orthopnea, edema or syncope.  His chronic bilateral lower extremity swelling is unchanged.  He has had worsening issues with urinary retention due to BPH and currently has an indwelling Girard catheter in place.  He is scheduled to undergo prostate [facility] with Dr[facility]     Prior History:  The patient was previously followed by Dr. Ramirez who he last saw in 5/2019.  He initially saw Dr. Ramirez in 2/2017 with new diagnosis of atrial fibrillation.  When he saw Dr. Ramirez at the time was the first time he had began seeing physicians again.  He was seen by Dr. Be around that time with complaints of dyspnea.  He was noted to be in atrial fibrillation with a new right bundle branch block at the time.  It was unclear how long he had been in atrial fibrillation.  It was recommended that he start metoprolol tartrate 25 mg twice a day for rate control and  apixaban for anticoagulation.  It was also set up for a stress test and an echocardiogram.  These were performed in 3/2017.  His stress test showed evidence of apical ischemia.  His echocardiogram showed normal left ventricular systolic function wall motion with an EF of 50 to 55%, moderately dilated left atrium, and moderate aortic regurgitation.       At the time Dr. Ramirez recommended proceeding with a cardioversion to see if this would help with his symptoms.  He underwent cardioversion in 3/2017 but he only lasted in sinus rhythm for about 10 minutes.  He was then placed on propafenone and known and another attempted a cardioversion was performed in 5/2017.  This time around he remained in atrial fibrillation for just about a day.  He noted no significant change in his symptoms the day he remained in sinus rhythm.  He was then evaluated by Dr. Marti in 7/2017.  When he saw Dr. Marti at that time he reported fatigue starting 2 years prior but that the shortness of breath started earlier that year.  Dr. Marti did not recommend any further attempts to obtain sinus rhythm and recommended that the patient stop propafenone and undergo a sleep evaluation for sleep apnea.  It appears that since then he has been diagnosed with sleep apnea and has been compliant with CPAP.     I saw him initially in 1/2020 at which time his symptoms were largely unchanged.  I recommended that he try and increase his activity before proceeding with further cardiac work up.      In follow-up in 7/2020 he reported progression in his dyspnea on exertion and fatigue.  He admitted he really had not tried increasing his activity because of the dyspnea associated with any attempt.  Following that office visit I recommended proceeding with a stress test and an echocardiogram.  He underwent both a stress test and the echocardiogram on 9/3/2020.  His echocardiogram showed normal left ventricular systolic function wall motion with an EF of 55  to 60% and evidence of severe aortic regurgitation.  Stress test showed findings of inferior and apical ischemia.  Recommended proceeding with a cardiac catheterization which was performed on 9/21/2020.  This showed mild to moderate nonobstructive coronary artery disease primarily involving a ramus intermedius branch.  FFR of this vessel was normal at 0.95.  Recommended medical management at that time.     In 12/2020 patient continued to complain of dyspnea on exertion and fatigue.  I rereviewed his echocardiographic images with Dr. Dowling who agreed that his aortic valve regurgitation was at least moderate and could be severe.  We decided to repeat echocardiogram with limited images at the main office.  This was performed on 1/18/2021 and showed mild aortic regurgitation with normal left ventricular systolic function and an EF of 59%.     In follow-up the patient developed issues with muscle and joint pain.  Ended up stopping the low-dose atorvastatin to see if this was contributing to her symptoms.  In follow-up he reported improvement in the symptoms but it was unclear if he is related to the discontinuation of atorvastatin.    Review of Systems   Constitutional: Positive for malaise/fatigue.   HENT: Negative for hearing loss, hoarse voice, nosebleeds and sore throat.    Eyes: Negative for pain.   Cardiovascular: Positive for dyspnea on exertion and leg swelling. Negative for chest pain, claudication, cyanosis, irregular heartbeat, near-syncope, orthopnea, palpitations, paroxysmal nocturnal dyspnea and syncope.   Respiratory: Negative for shortness of breath and snoring.    Endocrine: Negative for cold intolerance, heat intolerance, polydipsia, polyphagia and polyuria.   Skin: Negative for itching and rash.   Musculoskeletal: Negative for arthritis, falls, joint pain, joint swelling, muscle cramps, muscle weakness and myalgias.   Gastrointestinal: Negative for constipation, diarrhea, dysphagia, heartburn,  hematemesis, hematochezia, melena, nausea and vomiting.   Genitourinary: Negative for frequency, hematuria and hesitancy.   Neurological: Negative for excessive daytime sleepiness, dizziness, headaches, light-headedness, numbness and weakness.   Psychiatric/Behavioral: Negative for depression. The patient is not nervous/anxious.          Current Outpatient Medications:   •  amlodipine-olmesartan (CAROLANN) 10-40 MG per tablet, Take 1 tablet by mouth Daily., Disp: , Rfl:   •  aspirin (aspirin) 81 MG EC tablet, Take 1 tablet by mouth Daily., Disp:  , Rfl:   •  cephalexin (KEFLEX) 500 MG capsule, Take 500 mg by mouth Daily., Disp: , Rfl:   •  cetirizine (zyrTEC) 10 MG tablet, Take 10 mg by mouth Daily., Disp: , Rfl:   •  Eliquis 5 MG tablet tablet, Take 1 tablet by mouth twice daily, Disp: 180 tablet, Rfl: 3  •  guaiFENesin (MUCINEX) 600 MG 12 hr tablet, Take 1,200 mg by mouth 2 (Two) Times a Day., Disp: , Rfl:   •  levoFLOXacin (LEVAQUIN) 500 MG tablet, Take 500 mg by mouth Every Morning., Disp: , Rfl:   •  metoprolol tartrate (LOPRESSOR) 50 MG tablet, Take 50 mg by mouth 2 (Two) Times a Day., Disp: , Rfl: 0  •  montelukast (SINGULAIR) 10 MG tablet, Take 10 mg by mouth Every Night., Disp: , Rfl:   •  nitrofurantoin, macrocrystal-monohydrate, (MACROBID) 100 MG capsule, Take 100 mg by mouth Daily., Disp: , Rfl:   •  omeprazole (priLOSEC) 20 MG capsule, , Disp: , Rfl:   •  predniSONE (DELTASONE) 5 MG tablet, Take 10 mg by mouth Daily., Disp: , Rfl:   •  sulfaSALAzine (AZULFIDINE) 500 MG tablet, 1 tablet., Disp: , Rfl:     Past Medical History:   Diagnosis Date   • Allergic rhinitis    • Atrial fibrillation (HCC)    • Benign hypertension    • History of bleeding ulcers    • Marginal perforation of tympanic membrane    • Otorrhea    • PAF (paroxysmal atrial fibrillation) (HCC)        Past Surgical History:   Procedure Laterality Date   • CARDIAC CATHETERIZATION N/A 9/21/2020    Procedure: Coronary angiography;  Surgeon: Stacy  "MD Nita;  Location:  [facility] CATH INVASIVE LOCATION;  Service: Cardiovascular;  Laterality: N/A;   • CARDIAC CATHETERIZATION N/A 2020    Procedure: Left heart cath;  Surgeon: Nita Kumar MD;  Location:  [facility] CATH INVASIVE LOCATION;  Service: Cardiovascular;  Laterality: N/A;   • CARDIAC CATHETERIZATION N/A 2020    Procedure: Left ventriculography;  Surgeon: Nita Kumar MD;  Location:  [facility] CATH INVASIVE LOCATION;  Service: Cardiovascular;  Laterality: N/A;   • CARDIAC CATHETERIZATION  2020    Procedure: Functional Flow [place];  Surgeon: Nita Kumar MD;  Location:  [facility] CATH INVASIVE LOCATION;  Service: Cardiovascular;;   • EXTERNAL EAR SURGERY Left    • KNEE SURGERY         Family History   Problem Relation Age of Onset   • Tuberculosis Father    • No Known Problems Mother        Social History     Tobacco Use   • Smoking status: Former     Types: Cigarettes     Quit date: 1970     Years since quittin.0   • Smokeless tobacco: Former   Substance Use Topics   • Alcohol use: Not Currently   • Drug use: No         ECG 12 Lead    Date/Time: 2022 12:27 PM  Performed by: Nita Kumar MD  Authorized by: Nita Kumar MD   Comparison: compared with previous ECG   Similar to previous ECG  Rhythm: atrial fibrillation  Ectopy: unifocal PVCs  Conduction: right bundle branch block               Objective:     Visit Vitals  /92 (BP Location: Left arm, Patient Position: Sitting, Cuff Size: Adult)   Pulse 77   Ht 182.9 cm (72\")   Wt 90.7 kg (200 lb)   SpO2 97%   BMI 27.12 kg/m²         Constitutional:       Appearance: Normal appearance. Well-developed.   HENT:      Head: Normocephalic and atraumatic.   Neck:      Vascular: No carotid bruit or JVD.   Pulmonary:      Effort: Pulmonary effort is normal.      Breath sounds: Normal breath sounds.   Cardiovascular:      Normal rate. Irregularly irregular rhythm.      No gallop.   Pulses:     Radial: 2+ " bilaterally.  Edema:     Peripheral edema present.     Pretibial: bilateral 2+ edema of the pretibial area.     Ankle: bilateral 2+ edema of the ankle.  Abdominal:      Palpations: Abdomen is soft.   Skin:     General: Skin is warm and dry.   Neurological:      Mental Status: Alert and oriented to person, place, and time.             Assessment:          Diagnosis Plan   1. Coronary artery disease involving native coronary artery of native heart without angina pectoris        2. Permanent atrial fibrillation (HCC)        3. Essential hypertension        4. MAXINE on CPAP               Plan:       1.  Preoperative evaluation.  Besides generalized fatigue he does not report any symptoms of worsening dyspnea or chest pain.  His EKG shows isolated PVCs but otherwise is unchanged.  Start with an echocardiogram to ensure that his LV function has not changed and that he had no further issues with aortic valve regurgitation.  This looks okay I think he can proceed with surgery as planned.  2.  Nonobstructive coronary artery disease.  Symptoms do not appear specific for angina.  EKG shows no ischemic changes except for unifocal PVCs.  He is on aspirin.  Has not been on statin due to his joint and muscle issues.  Continue current management.  3.  Hypertension.  Mildly elevated today but normally well controlled.  Monitor on current regimen.  4.  Chronic atrial fibrillation.  Rate controlled on metoprolol.  On chronic anticoagulation with apixaban.  Continue the same.  5.  Obstructive sleep apnea.  6.  Rheumatoid arthritis.  7.  BPH.  Resulting in urinary retention requiring chronic indwelling Girard.    I will call and discuss results of the patient's echocardiogram.  We will schedule him to see Dr. Ballesteros at the [place] location in 6 months.

## 2022-12-15 ENCOUNTER — TELEPHONE (OUTPATIENT)
Dept: CARDIOLOGY | Facility: CLINIC | Age: 82
End: 2022-12-15

## 2022-12-23 ENCOUNTER — TELEPHONE (OUTPATIENT)
Dept: CARDIOLOGY | Facility: CLINIC | Age: 82
End: 2022-12-23

## 2022-12-23 NOTE — TELEPHONE ENCOUNTER
Pt had his echo done yesterday at [facility] and they are wanting to know the results of this. Pt is scheduled for surgery 12/29. I called to get the results faxed from [facility] however the office was closed and I could not get through. Pt's wife stated that when they were getting this done they were told there was a way you could see them in the computer. If so, could you please advise if pt is cleared for surgery?

## 2022-12-27 NOTE — TELEPHONE ENCOUNTER
I received the results of his echocardiogram today.  It shows low normal left ventricular systolic function with an EF of 45 to 50%, no significant valvular disease and a right ventricular systolic pressure 33 mmHg.  I think is okay to proceed with planned surgery.  We will have a letter faxed to Dr. Pantoja's office.

## 2023-04-27 ENCOUNTER — HOSPITAL ENCOUNTER (INPATIENT)
Facility: HOSPITAL | Age: 83
LOS: 4 days | Discharge: HOME | End: 2023-05-02
Attending: INTERNAL MEDICINE | Admitting: INTERNAL MEDICINE
Payer: MEDICARE

## 2023-04-27 DIAGNOSIS — I50.41 ACUTE COMBINED SYSTOLIC AND DIASTOLIC CONGESTIVE HEART FAILURE: Primary | ICD-10-CM

## 2023-04-27 DIAGNOSIS — G47.33 OSA ON CPAP: ICD-10-CM

## 2023-04-27 DIAGNOSIS — I42.9 CARDIOMYOPATHY, UNSPECIFIED TYPE: ICD-10-CM

## 2023-04-27 DIAGNOSIS — G47.33 OSA (OBSTRUCTIVE SLEEP APNEA): ICD-10-CM

## 2023-04-27 DIAGNOSIS — Z99.89 OSA ON CPAP: ICD-10-CM

## 2023-04-27 LAB
CREAT SERPL-MCNC: 1.04 MG/DL (ref 0.76–1.27)
EGFRCR SERPLBLD CKD-EPI 2021: 72.6 ML/MIN/1.73

## 2023-04-27 PROCEDURE — G0378 HOSPITAL OBSERVATION PER HR: HCPCS

## 2023-04-27 PROCEDURE — 82565 ASSAY OF CREATININE: CPT | Performed by: INTERNAL MEDICINE

## 2023-04-27 RX ORDER — SULFASALAZINE 500 MG/1
1000 TABLET ORAL 2 TIMES DAILY
Status: DISCONTINUED | OUTPATIENT
Start: 2023-04-27 | End: 2023-05-02 | Stop reason: HOSPADM

## 2023-04-27 RX ORDER — FOLIC ACID 1 MG/1
1 TABLET ORAL DAILY
COMMUNITY

## 2023-04-27 RX ORDER — FOLIC ACID 1 MG/1
1 TABLET ORAL DAILY
Status: DISCONTINUED | OUTPATIENT
Start: 2023-04-28 | End: 2023-05-02 | Stop reason: HOSPADM

## 2023-04-27 RX ORDER — PANTOPRAZOLE SODIUM 40 MG/1
40 TABLET, DELAYED RELEASE ORAL
Status: DISCONTINUED | OUTPATIENT
Start: 2023-04-28 | End: 2023-05-02 | Stop reason: HOSPADM

## 2023-04-27 RX ORDER — METOPROLOL TARTRATE 50 MG/1
50 TABLET, FILM COATED ORAL 2 TIMES DAILY
Status: DISCONTINUED | OUTPATIENT
Start: 2023-04-27 | End: 2023-04-28

## 2023-04-27 RX ADMIN — APIXABAN 5 MG: 5 TABLET, FILM COATED ORAL at 22:24

## 2023-04-27 RX ADMIN — METOPROLOL TARTRATE 50 MG: 50 TABLET, FILM COATED ORAL at 22:24

## 2023-04-27 RX ADMIN — SULFASALAZINE 1000 MG: 500 TABLET ORAL at 23:19

## 2023-04-27 NOTE — Clinical Note
Hemostasis started on the right radial artery. R-Band was used in achieving hemostasis. Radial compression device applied to vessel. Hemostasis achieved successfully. Closure device additional comment: TR band 12cc of air

## 2023-04-27 NOTE — Clinical Note
Prepped: Right Wrist. Prepped with: Hibiclens. The patient was draped in a sterile fashion. Right arm

## 2023-04-27 NOTE — Clinical Note
Hemostasis started on the right brachial vein. Manual pressure applied to vessel. Manual pressure was held by NG. Manual pressure was held for 3 min. Hemostasis achieved successfully. Closure device additional comment: Tegaderm

## 2023-04-28 ENCOUNTER — APPOINTMENT (OUTPATIENT)
Dept: CARDIOLOGY | Facility: HOSPITAL | Age: 83
End: 2023-04-28
Payer: MEDICARE

## 2023-04-28 PROBLEM — F05 DELIRIUM DUE TO ANOTHER MEDICAL CONDITION: Status: ACTIVE | Noted: 2023-04-28

## 2023-04-28 PROBLEM — I50.9 CHF (CONGESTIVE HEART FAILURE): Status: ACTIVE | Noted: 2023-04-28

## 2023-04-28 LAB
ALBUMIN SERPL-MCNC: 3.4 G/DL (ref 3.5–5.2)
ALBUMIN/GLOB SERPL: 1.1 G/DL
ALP SERPL-CCNC: 59 U/L (ref 39–117)
ALT SERPL W P-5'-P-CCNC: 7 U/L (ref 1–41)
ANION GAP SERPL CALCULATED.3IONS-SCNC: 9.4 MMOL/L (ref 5–15)
AST SERPL-CCNC: 9 U/L (ref 1–40)
BASOPHILS # BLD AUTO: 0.04 10*3/MM3 (ref 0–0.2)
BASOPHILS NFR BLD AUTO: 0.6 % (ref 0–1.5)
BH CV ECHO MEAS - AI P1/2T: 463.7 MSEC
BH CV ECHO MEAS - AO MAX PG: 7.4 MMHG
BH CV ECHO MEAS - AO MEAN PG: 3.9 MMHG
BH CV ECHO MEAS - AO V2 MAX: 135.7 CM/SEC
BH CV ECHO MEAS - AO V2 VTI: 23 CM
BH CV ECHO MEAS - EDV(MOD-SP2): 147 ML
BH CV ECHO MEAS - EDV(MOD-SP4): 143 ML
BH CV ECHO MEAS - EF(MOD-BP): 22 %
BH CV ECHO MEAS - EF(MOD-SP2): 18.4 %
BH CV ECHO MEAS - EF(MOD-SP4): 22.4 %
BH CV ECHO MEAS - ESV(MOD-SP2): 120 ML
BH CV ECHO MEAS - ESV(MOD-SP4): 111 ML
BH CV ECHO MEAS - LV DIASTOLIC VOL/BSA (35-75): 69.6 CM2
BH CV ECHO MEAS - LV MAX PG: 3.4 MMHG
BH CV ECHO MEAS - LV MEAN PG: 1.47 MMHG
BH CV ECHO MEAS - LV SYSTOLIC VOL/BSA (12-30): 54 CM2
BH CV ECHO MEAS - LV V1 MAX: 91.7 CM/SEC
BH CV ECHO MEAS - LV V1 VTI: 16.3 CM
BH CV ECHO MEAS - SI(MOD-SP2): 13.1 ML/M2
BH CV ECHO MEAS - SI(MOD-SP4): 15.6 ML/M2
BH CV ECHO MEAS - SV(MOD-SP2): 27 ML
BH CV ECHO MEAS - SV(MOD-SP4): 32 ML
BILIRUB SERPL-MCNC: 0.5 MG/DL (ref 0–1.2)
BUN SERPL-MCNC: 15 MG/DL (ref 8–23)
BUN/CREAT SERPL: 15.8 (ref 7–25)
CALCIUM SPEC-SCNC: 9.6 MG/DL (ref 8.6–10.5)
CHLORIDE SERPL-SCNC: 103 MMOL/L (ref 98–107)
CHOLEST SERPL-MCNC: 159 MG/DL (ref 0–200)
CO2 SERPL-SCNC: 26.6 MMOL/L (ref 22–29)
CREAT SERPL-MCNC: 0.95 MG/DL (ref 0.76–1.27)
DEPRECATED RDW RBC AUTO: 50.4 FL (ref 37–54)
EGFRCR SERPLBLD CKD-EPI 2021: 80.9 ML/MIN/1.73
EOSINOPHIL # BLD AUTO: 0.25 10*3/MM3 (ref 0–0.4)
EOSINOPHIL NFR BLD AUTO: 3.8 % (ref 0.3–6.2)
ERYTHROCYTE [DISTWIDTH] IN BLOOD BY AUTOMATED COUNT: 15.4 % (ref 12.3–15.4)
FOLATE SERPL-MCNC: >20 NG/ML (ref 4.78–24.2)
GLOBULIN UR ELPH-MCNC: 3.2 GM/DL
GLUCOSE SERPL-MCNC: 101 MG/DL (ref 65–99)
HCT VFR BLD AUTO: 39.8 % (ref 37.5–51)
HDLC SERPL-MCNC: 33 MG/DL (ref 40–60)
HGB BLD-MCNC: 13.1 G/DL (ref 13–17.7)
IMM GRANULOCYTES # BLD AUTO: 0.03 10*3/MM3 (ref 0–0.05)
IMM GRANULOCYTES NFR BLD AUTO: 0.5 % (ref 0–0.5)
LDLC SERPL CALC-MCNC: 106 MG/DL (ref 0–100)
LDLC/HDLC SERPL: 3.15 {RATIO}
LYMPHOCYTES # BLD AUTO: 1.19 10*3/MM3 (ref 0.7–3.1)
LYMPHOCYTES NFR BLD AUTO: 18.3 % (ref 19.6–45.3)
MAXIMAL PREDICTED HEART RATE: 140 BPM
MCH RBC QN AUTO: 29.7 PG (ref 26.6–33)
MCHC RBC AUTO-ENTMCNC: 32.9 G/DL (ref 31.5–35.7)
MCV RBC AUTO: 90.2 FL (ref 79–97)
MONOCYTES # BLD AUTO: 0.83 10*3/MM3 (ref 0.1–0.9)
MONOCYTES NFR BLD AUTO: 12.8 % (ref 5–12)
NEUTROPHILS NFR BLD AUTO: 4.16 10*3/MM3 (ref 1.7–7)
NEUTROPHILS NFR BLD AUTO: 64 % (ref 42.7–76)
NRBC BLD AUTO-RTO: 0 /100 WBC (ref 0–0.2)
NT-PROBNP SERPL-MCNC: ABNORMAL PG/ML (ref 0–1800)
PLATELET # BLD AUTO: 207 10*3/MM3 (ref 140–450)
PMV BLD AUTO: 9.6 FL (ref 6–12)
POTASSIUM SERPL-SCNC: 3.1 MMOL/L (ref 3.5–5.2)
PROT SERPL-MCNC: 6.6 G/DL (ref 6–8.5)
QT INTERVAL: 443 MS
RBC # BLD AUTO: 4.41 10*6/MM3 (ref 4.14–5.8)
SODIUM SERPL-SCNC: 139 MMOL/L (ref 136–145)
STRESS TARGET HR: 119 BPM
T4 FREE SERPL-MCNC: 1.33 NG/DL (ref 0.93–1.7)
TRIGL SERPL-MCNC: 111 MG/DL (ref 0–150)
TSH SERPL DL<=0.05 MIU/L-ACNC: 4.9 UIU/ML (ref 0.27–4.2)
VIT B12 BLD-MCNC: 961 PG/ML (ref 211–946)
VLDLC SERPL-MCNC: 20 MG/DL (ref 5–40)
WBC NRBC COR # BLD: 6.5 10*3/MM3 (ref 3.4–10.8)

## 2023-04-28 PROCEDURE — 80061 LIPID PANEL: CPT | Performed by: INTERNAL MEDICINE

## 2023-04-28 PROCEDURE — 99222 1ST HOSP IP/OBS MODERATE 55: CPT | Performed by: INTERNAL MEDICINE

## 2023-04-28 PROCEDURE — 93308 TTE F-UP OR LMTD: CPT | Performed by: INTERNAL MEDICINE

## 2023-04-28 PROCEDURE — 84439 ASSAY OF FREE THYROXINE: CPT | Performed by: STUDENT IN AN ORGANIZED HEALTH CARE EDUCATION/TRAINING PROGRAM

## 2023-04-28 PROCEDURE — 93321 DOPPLER ECHO F-UP/LMTD STD: CPT

## 2023-04-28 PROCEDURE — 93005 ELECTROCARDIOGRAM TRACING: CPT | Performed by: INTERNAL MEDICINE

## 2023-04-28 PROCEDURE — 93325 DOPPLER ECHO COLOR FLOW MAPG: CPT | Performed by: INTERNAL MEDICINE

## 2023-04-28 PROCEDURE — 83880 ASSAY OF NATRIURETIC PEPTIDE: CPT | Performed by: INTERNAL MEDICINE

## 2023-04-28 PROCEDURE — 93321 DOPPLER ECHO F-UP/LMTD STD: CPT | Performed by: INTERNAL MEDICINE

## 2023-04-28 PROCEDURE — 85025 COMPLETE CBC W/AUTO DIFF WBC: CPT | Performed by: INTERNAL MEDICINE

## 2023-04-28 PROCEDURE — 80053 COMPREHEN METABOLIC PANEL: CPT | Performed by: INTERNAL MEDICINE

## 2023-04-28 PROCEDURE — 93325 DOPPLER ECHO COLOR FLOW MAPG: CPT

## 2023-04-28 PROCEDURE — 93010 ELECTROCARDIOGRAM REPORT: CPT | Performed by: INTERNAL MEDICINE

## 2023-04-28 PROCEDURE — 82746 ASSAY OF FOLIC ACID SERUM: CPT | Performed by: STUDENT IN AN ORGANIZED HEALTH CARE EDUCATION/TRAINING PROGRAM

## 2023-04-28 PROCEDURE — 93308 TTE F-UP OR LMTD: CPT

## 2023-04-28 PROCEDURE — 84443 ASSAY THYROID STIM HORMONE: CPT | Performed by: STUDENT IN AN ORGANIZED HEALTH CARE EDUCATION/TRAINING PROGRAM

## 2023-04-28 PROCEDURE — 82607 VITAMIN B-12: CPT | Performed by: STUDENT IN AN ORGANIZED HEALTH CARE EDUCATION/TRAINING PROGRAM

## 2023-04-28 PROCEDURE — 25010000002 FUROSEMIDE PER 20 MG: Performed by: INTERNAL MEDICINE

## 2023-04-28 PROCEDURE — 63710000001 PREDNISONE PER 5 MG: Performed by: INTERNAL MEDICINE

## 2023-04-28 RX ORDER — PREDNISONE 10 MG/1
10 TABLET ORAL
Status: DISCONTINUED | OUTPATIENT
Start: 2023-04-28 | End: 2023-05-02 | Stop reason: HOSPADM

## 2023-04-28 RX ORDER — OLANZAPINE 10 MG/1
5 INJECTION, POWDER, LYOPHILIZED, FOR SOLUTION INTRAMUSCULAR 2 TIMES DAILY PRN
Status: DISCONTINUED | OUTPATIENT
Start: 2023-04-28 | End: 2023-05-02 | Stop reason: HOSPADM

## 2023-04-28 RX ORDER — POTASSIUM CHLORIDE 7.45 MG/ML
10 INJECTION INTRAVENOUS
Status: DISCONTINUED | OUTPATIENT
Start: 2023-04-28 | End: 2023-05-02 | Stop reason: HOSPADM

## 2023-04-28 RX ORDER — SODIUM CHLORIDE 9 MG/ML
40 INJECTION, SOLUTION INTRAVENOUS AS NEEDED
Status: DISCONTINUED | OUTPATIENT
Start: 2023-04-28 | End: 2023-05-02 | Stop reason: HOSPADM

## 2023-04-28 RX ORDER — FUROSEMIDE 10 MG/ML
40 INJECTION INTRAMUSCULAR; INTRAVENOUS ONCE
Status: COMPLETED | OUTPATIENT
Start: 2023-04-28 | End: 2023-04-28

## 2023-04-28 RX ORDER — POTASSIUM CHLORIDE 1.5 G/1.77G
40 POWDER, FOR SOLUTION ORAL AS NEEDED
Status: DISCONTINUED | OUTPATIENT
Start: 2023-04-28 | End: 2023-05-02 | Stop reason: HOSPADM

## 2023-04-28 RX ORDER — POTASSIUM CHLORIDE 750 MG/1
40 TABLET, FILM COATED, EXTENDED RELEASE ORAL AS NEEDED
Status: DISCONTINUED | OUTPATIENT
Start: 2023-04-28 | End: 2023-05-02 | Stop reason: HOSPADM

## 2023-04-28 RX ORDER — FUROSEMIDE 10 MG/ML
40 INJECTION INTRAMUSCULAR; INTRAVENOUS EVERY 12 HOURS
Status: DISCONTINUED | OUTPATIENT
Start: 2023-04-28 | End: 2023-04-30

## 2023-04-28 RX ORDER — SODIUM CHLORIDE 0.9 % (FLUSH) 0.9 %
10 SYRINGE (ML) INJECTION EVERY 12 HOURS SCHEDULED
Status: DISCONTINUED | OUTPATIENT
Start: 2023-04-28 | End: 2023-05-02 | Stop reason: HOSPADM

## 2023-04-28 RX ORDER — CARVEDILOL 6.25 MG/1
6.25 TABLET ORAL 2 TIMES DAILY WITH MEALS
Status: DISCONTINUED | OUTPATIENT
Start: 2023-04-28 | End: 2023-05-02 | Stop reason: HOSPADM

## 2023-04-28 RX ORDER — SODIUM CHLORIDE 0.9 % (FLUSH) 0.9 %
10 SYRINGE (ML) INJECTION AS NEEDED
Status: DISCONTINUED | OUTPATIENT
Start: 2023-04-28 | End: 2023-05-02 | Stop reason: HOSPADM

## 2023-04-28 RX ADMIN — FUROSEMIDE 40 MG: 10 INJECTION, SOLUTION INTRAMUSCULAR; INTRAVENOUS at 18:23

## 2023-04-28 RX ADMIN — METOPROLOL TARTRATE 50 MG: 50 TABLET, FILM COATED ORAL at 09:06

## 2023-04-28 RX ADMIN — PREDNISONE 10 MG: 10 TABLET ORAL at 12:53

## 2023-04-28 RX ADMIN — PANTOPRAZOLE SODIUM 40 MG: 40 TABLET, DELAYED RELEASE ORAL at 08:04

## 2023-04-28 RX ADMIN — FUROSEMIDE 40 MG: 10 INJECTION, SOLUTION INTRAMUSCULAR; INTRAVENOUS at 12:53

## 2023-04-28 RX ADMIN — SULFASALAZINE 1000 MG: 500 TABLET ORAL at 21:21

## 2023-04-28 RX ADMIN — Medication 10 ML: at 21:21

## 2023-04-28 RX ADMIN — POTASSIUM CHLORIDE 40 MEQ: 750 TABLET, EXTENDED RELEASE ORAL at 15:57

## 2023-04-28 RX ADMIN — Medication 1 MG: at 09:06

## 2023-04-28 RX ADMIN — POTASSIUM CHLORIDE 40 MEQ: 750 TABLET, EXTENDED RELEASE ORAL at 18:23

## 2023-04-28 RX ADMIN — SULFASALAZINE 1000 MG: 500 TABLET ORAL at 09:06

## 2023-04-28 RX ADMIN — APIXABAN 5 MG: 5 TABLET, FILM COATED ORAL at 09:06

## 2023-04-28 RX ADMIN — CARVEDILOL 6.25 MG: 6.25 TABLET, FILM COATED ORAL at 18:23

## 2023-04-28 RX ADMIN — APIXABAN 5 MG: 5 TABLET, FILM COATED ORAL at 21:21

## 2023-04-28 RX ADMIN — Medication 10 ML: at 12:53

## 2023-04-28 RX ADMIN — POTASSIUM CHLORIDE 40 MEQ: 750 TABLET, EXTENDED RELEASE ORAL at 12:53

## 2023-04-28 NOTE — PLAN OF CARE
Goal Outcome Evaluation:  Plan of Care Reviewed With: patient           Outcome Evaluation: WANTED HIS CAR KEYS TO LEAVE.  KEPT PULLING OFF HEART MONITOR AND O2 SAT FINGER PROBE AND EVERY TIME WE HAD TO REAPPLY THEM HE BECOME MORE AND MORE ANGRY AND AGITATED. SETTING OFF BED ALARM WHEN GETTING OUT OF BED,  AND WHEN   UP IN ROOM HE WAS VERY UNSTEADY AND SHORT OF BREATH AND WOULN'T GO BACK TO BED. HAD TO APPLY SOFT RESTRAINTS SO AS TO CONTINUE TO MONITOR HIM AND FOR HIS SAFETY.

## 2023-04-28 NOTE — H&P
[facility] History And Physical Assessment    Patient Name: Nito Corado  Age/Sex: 82 y.o. male  : 1940  MRN: [id number]    Date of Hospital Admission: 2023  Date of Encounter Visit: 23  Encounter Provider: Nita Kumar MD  Place of Service: [facility] CARDIOLOGY  Patient Care Team:  Jean Weaver MD as PCP - General (Family Medicine)    Subjective:     Chief Complaint: Shortness of breath    History of Present Illness:  Nito Corado is a 82 y.o. male with aortic valve regurgitation, nonobstructive coronary artery disease, permanent atrial fibrillation, hypertension, rheumatoid arthritis, hyperlipidemia, who was transferred from [facility] with congestive heart failure and aortic valve regurgitation.    The patient and his wife report that since I saw him last in 2022 he has been having progressively worsening generalized weakness and shortness of breath.  His symptoms became much more severe over the last 2 or 3 weeks.  The patient's wife also indicates that he has had some issues with his memory and increased confusion during that period of time.      His symptoms became bad enough that he ended up presenting to the [facility] emergency room in [place].  His work-up there showed 2 normal troponins, unremarkable EKG, and lab work as below.    Normal white blood count, hemoglobin 13.1, platelet count 232.  Sodium 143, potassium 3.3, BUN 15, creatinine 0.96, CO2 30.  Lactic acid 1.7.  Liver panel unremarkable.  Troponins less than 0.01x2.  BNP 26,800.  Chest x-ray with bilateral atelectasis and trace effusions.    He was admitted to the hospital and diuresed with IV furosemide.  An echocardiogram was performed that reportedly showed mildly depressed left ventricular systolic function with an EF of 45 to 50% and severe aortic valve regurgitation.  Based on these findings I was contacted about transfer to [facility] for further work-up and management.    Unfortunately following his transfer last night the patient was belligerent and confused overnight.  He eventually required placement in restraints.    Shortly after his transfer to [facility] his Girard catheter that had been placed at [facility] was immediately removed.  He reports discomfort following the Girard catheter removal.  He denies any chest pain.  He continues to have shortness of breath and symptoms of orthopnea.  He reportedly had issues with hypoxia when sleeping although this morning he is on room air with saturations around 94 to 96%.  He still feels like he is more out of breath than normal with activity.  He denies any palpitations, lightheadedness, dizziness, near-syncope or syncope, or lower extremity edema.    The patient's last echocardiogram prior to this admission was in 1/2021 and showed normal left ventricular systolic function with an EF of 59% and mild aortic valve regurgitation.  His last ischemic work-up included a cardiac catheterization which was performed in 9/2020.  This showed mild to moderate nonobstructive disease primarily involving the ramus intermedius branch which had a normal FFR.  He has been on medical management since then.  He was on statin therapy until he began having more issues with joint pain at which point he stopped the statin and it has not been resumed.  He has since been diagnosed with rheumatoid arthritis and started on treatment with steroids and now methotrexate and sulfasalazine..    Past Medical History:  Past Medical History:   Diagnosis Date   • Allergic rhinitis    • Atrial fibrillation    • Benign hypertension    • History of bleeding ulcers    • Marginal perforation of tympanic membrane    • Otorrhea    • PAF (paroxysmal atrial fibrillation)        Past Surgical History:   Procedure Laterality Date   • CARDIAC CATHETERIZATION N/A 9/21/2020    Procedure: Coronary angiography;  Surgeon:  Nita Kumar MD;  Location:  [facility] INVASIVE LOCATION;  Service: Cardiovascular;  Laterality: N/A;   • CARDIAC CATHETERIZATION N/A 2020    Procedure: Left heart cath;  Surgeon: Nita Kumar MD;  Location:  [facility]VASIVE LOCATION;  Service: Cardiovascular;  Laterality: N/A;   • CARDIAC CATHETERIZATION N/A 2020    Procedure: Left ventriculography;  Surgeon: Nita Kumar MD;  Location:  [facility]VASIVE LOCATION;  Service: Cardiovascular;  Laterality: N/A;   • CARDIAC CATHETERIZATION  2020    Procedure: Functional Flow [place];  Surgeon: Nita Kumar MD;  Location:  [facility] CATH INVASIVE LOCATION;  Service: Cardiovascular;;   • EXTERNAL EAR SURGERY Left    • KNEE SURGERY         Home Medications:   Medications Prior to Admission   Medication Sig Dispense Refill Last Dose   • Eliquis 5 MG tablet tablet Take 1 tablet by mouth twice daily 180 tablet 3 2023   • folic acid (FOLVITE) 1 MG tablet Take 1 tablet by mouth Daily.   2023   • methotrexate 2.5 MG tablet Take 8 tablets by mouth 1 (One) Time Per Week. SATURDAY   Past Week   • metoprolol tartrate (LOPRESSOR) 50 MG tablet Take 1 tablet by mouth 2 (Two) Times a Day.  0 2023   • omeprazole (priLOSEC) 20 MG capsule    2023   • sulfaSALAzine (AZULFIDINE) 500 MG tablet Take 2 tablets by mouth 2 (Two) Times a Day.   2023       Allergies:  Allergies   Allergen Reactions   • American [place] Cough       Past Social History:  Social History     Socioeconomic History   • Marital status:    Tobacco Use   • Smoking status: Former     Types: Cigarettes     Quit date: 1970     Years since quittin.4   • Smokeless tobacco: Former   Vaping Use   • Vaping Use: Never used   Substance and Sexual Activity   • Alcohol use: Yes     Alcohol/week: 1.0 standard drink     Types: 1 Shots of liquor per week   • Drug use: No   • Sexual activity: Defer       Past Family History:  Family History   Problem Relation Age  of Onset   • Tuberculosis Father    • No Known Problems Mother        Review of Systems:   All systems reviewed. Pertinent positives identified in HPI. All other systems are negative.    Objective:   Temp:  [98.2 °F (36.8 °C)-98.5 °F (36.9 °C)] 98.4 °F (36.9 °C)  Heart Rate:  [56-83] 83  Resp:  [20-22] 20  BP: (156-170)/(88-92) 164/92    Intake/Output Summary (Last 24 hours) at 4/28/2023 0752  Last data filed at 4/28/2023 0400  Gross per 24 hour   Intake 222 ml   Output --   Net 222 ml     Body mass index is 25.24 kg/m².      04/27/23  1837   Weight: 84.4 kg (186 lb 1.1 oz)     Weight change:     Physical Exam:   General Appearance:    Alert, cooperative, in no acute distress   Head:    Normocephalic, without obvious abnormality, atraumatic   Eyes:            Lids and lashes normal, conjunctivae and sclerae normal, no   icterus, no pallor, corneas clear, PERRLA   Ears:    Ears appear intact with no abnormalities noted   Neck:   No adenopathy, supple, trachea midline, no thyromegaly, no   carotid bruit, no JVD   Lungs:     Clear to auscultation,respirations regular, even and unlabored    Heart:   Irregularly, irregular, normal S1 and S2, no murmur, no gallop, no rub, no click   Chest Wall:    No abnormalities observed   Abdomen:     Normal bowel sounds, no masses, no organomegaly, soft        non-tender, non-distended, no guarding, no rebound  tenderness   Extremities:   Moves all extremities well, no edema, no cyanosis, no redness   Pulses:   Pulses palpable and equal bilaterally. Normal radial, carotid, femoral, dorsalis pedis and posterior tibial pulses bilaterally. Normal abdominal aorta   Skin:  Psychiatric:   No bleeding, bruising or rash    Alert and oriented x 3, normal mood and affect         Lab Review:   Outside labs reviewed as per HPI.    Results from last 7 days   Lab Units 04/27/23  2211   CREATININE mg/dL 1.04       Imaging:  Imaging Results (Most Recent)     None        I personally viewed and  interpreted the patient's EKG    Assessment/Plan:     1.  Acute congestive heart failure.  Reportedly with mild depression of his LV systolic function on his most recent echocardiogram.  I do not have the report to review.  He was diuresed with IV furosemide at [facility].  2.  Aortic valve regurgitation.  Last echocardiogram in 2021 showed mild aortic valve regurgitation.  Reportedly his valvular agitation now appears severe on his most recent echocardiogram.  3.  Reported cardiomyopathy.  Reportedly with an EF of 45 to 50%.  4.  Coronary artery disease.  Prior nonobstructive disease noted in 2020.  His EKG does show some lateral T wave changes but his troponins at the outside hospital were normal and he is not having any anginal symptoms at this time.  5.  Hypertension.  Poorly controlled.  6.  Permanent atrial fibrillation.  Rate controlled.  On apixaban for anticoagulation.  7.  Rheumatoid arthritis.  On prednisone 10 mg daily, methotrexate once weekly, and sulfasalazine.  8.  Confusion/altered mental status.  Patient appeared to sundown last night.  This morning he appears to be more appropriate.    - We will review his echocardiogram images from the outside hospital and determine further work-up based on this review.  - Recheck lab work including CBC, CMP, and BNP.  Based on this we will consider resuming IV diuresis.  - Continue apixaban and metoprolol for now.  - Titrate antihypertensive medications as needed.  - Resume prednisone, methotrexate, and sulfasalazine.  - Medicine consulted to help with issues with confusion and altered mental status although he appears more appropriate this morning.    Nita Kumar MD  04/28/23  07:52 EDT    ADDENDUM:  Attempted to echocardiogram images from outside hospital but unable to do so despite multiple attempts.  We will go ahead and just get a repeat limited echocardiogram here to evaluate his aortic valve and LV function.

## 2023-04-28 NOTE — CONSULTS
"    Patient Name:  Nito Corado  YOB: 1940  Patient Care Team:  Jean Weaver MD as PCP - General (Family Medicine)    Date of Admission:  4/27/2023  Date of Consult:  4/28/2023    Inpatient Hospitalist Consult  Consult performed by: Andres Cano MD  Consult ordered by: Atiya Sibley APRN        Evaluate status and make recommendations regarding treatment for behavior disturbance.  Subjective   History of Present Illness  Mr. Corado is a 82 y.o. male that has been admitted to [facility] due to CHF exacerbation and severe AVR.  He has been admitted by Nita Kumar MD and and we were asked to see and assist with his medical problems, specifically relating to his confusion.    Patient initially presented to outside facility given concerns for heart failure exacerbation.  Initially received IV diuresis, and TTE performed that reportedly showed EF 45 to 50%, though severe AVR.  Patient was reportedly belligerent and confused overnight, prompting him to be placed in restraints.    This morning he is much improved.  He is A&O.  He does have trouble with some details with his history.  Wife also at bedside and history obtained from him.  He does note that he has had increasing hallucinations over the past few months, noticing \"animals, usually worse at night.  He denies fevers, chills, chest pain.  Does report dyspnea on exertion over the past few months.  Otherwise no congestion, abdominal pain, N/V/D, urinary symptoms.    Past Medical History:   Diagnosis Date   • Allergic rhinitis    • Atrial fibrillation    • Benign hypertension    • History of bleeding ulcers    • Marginal perforation of tympanic membrane    • Otorrhea    • PAF (paroxysmal atrial fibrillation)      Past Surgical History:   Procedure Laterality Date   • CARDIAC CATHETERIZATION N/A 9/21/2020    Procedure: Coronary angiography;  Surgeon: Nita Kumar MD;  Location: [facility] " LOCATION;  Service: Cardiovascular;  Laterality: N/A;   • CARDIAC CATHETERIZATION N/A 2020    Procedure: Left heart cath;  Surgeon: Nita Kumar MD;  Location:  [facility]ATH [facility]VASIVE LOCATION;  Service: Cardiovascular;  Laterality: N/A;   • CARDIAC CATHETERIZATION N/A 2020    Procedure: Left ventriculography;  Surgeon: Nita Kumar MD;  Location:  [facility]ATH INVASIVE LOCATION;  Service: Cardiovascular;  Laterality: N/A;   • CARDIAC CATHETERIZATION  2020    Procedure: Functional Flow [place];  Surgeon: Nita Kumar MD;  Location:  [facility] INVASIVE LOCATION;  Service: Cardiovascular;;   • EXTERNAL EAR SURGERY Left    • KNEE SURGERY       Family History   Problem Relation Age of Onset   • Tuberculosis Father    • No Known Problems Mother      Social History     Tobacco Use   • Smoking status: Former     Types: Cigarettes     Quit date: 1970     Years since quittin.4   • Smokeless tobacco: Former   Vaping Use   • Vaping Use: Never used   Substance Use Topics   • Alcohol use: Yes     Alcohol/week: 1.0 standard drink     Types: 1 Shots of liquor per week   • Drug use: No     Medications Prior to Admission   Medication Sig Dispense Refill Last Dose   • Eliquis 5 MG tablet tablet Take 1 tablet by mouth twice daily 180 tablet 3 2023   • folic acid (FOLVITE) 1 MG tablet Take 1 tablet by mouth Daily.   2023   • methotrexate 2.5 MG tablet Take 8 tablets by mouth 1 (One) Time Per Week. SATURDAY   Past Week   • metoprolol tartrate (LOPRESSOR) 50 MG tablet Take 1 tablet by mouth 2 (Two) Times a Day.  0 2023   • omeprazole (priLOSEC) 20 MG capsule    2023   • sulfaSALAzine (AZULFIDINE) 500 MG tablet Take 2 tablets by mouth 2 (Two) Times a Day.   2023     Allergies:  American [place]    Review of Systems   Constitutional: Negative for chills, fatigue and fever.   HENT: Negative for congestion, hearing loss, rhinorrhea and sore throat.    Eyes: Negative for pain,  discharge and itching.   Respiratory: Positive for cough and shortness of breath. Negative for chest tightness.    Cardiovascular: Negative for chest pain and palpitations.   Gastrointestinal: Negative for abdominal pain, constipation, nausea and vomiting.   Endocrine: Negative for cold intolerance.   Genitourinary: Negative for difficulty urinating, frequency and urgency.   Musculoskeletal: Negative for arthralgias, back pain and joint swelling.   Skin: Negative for color change and rash.   Neurological: Negative for syncope, speech difficulty, weakness, light-headedness and numbness.   Hematological: Negative for adenopathy. Does not bruise/bleed easily.   Psychiatric/Behavioral: Positive for confusion and hallucinations. Negative for suicidal ideas.       Objective      Vital Signs  Temp:  [98.2 °F (36.8 °C)-98.5 °F (36.9 °C)] 98.4 °F (36.9 °C)  Heart Rate:  [56-83] 83  Resp:  [20-22] 20  BP: (156-170)/(88-92) 165/91  Body mass index is 25.24 kg/m².    Physical Exam  Constitutional:       General: He is not in acute distress.     Appearance: He is ill-appearing.   Eyes:      Extraocular Movements: Extraocular movements intact.      Pupils: Pupils are equal, round, and reactive to light.   Cardiovascular:      Rate and Rhythm: Normal rate. Rhythm irregular.      Pulses: Normal pulses.      Heart sounds: No murmur heard.  Pulmonary:      Effort: Pulmonary effort is normal. No respiratory distress.      Breath sounds: Normal breath sounds.   Abdominal:      General: Abdomen is flat. There is no distension.      Palpations: Abdomen is soft.      Tenderness: There is no abdominal tenderness.   Musculoskeletal:      Right lower leg: No edema.      Left lower leg: No edema.   Skin:     Coloration: Skin is not jaundiced.   Neurological:      General: No focal deficit present.      Mental Status: He is alert and oriented to person, place, and time.   Psychiatric:         Mood and Affect: Mood normal.         Results  Review:   I have personally reviewed:  [x]  Laboratory  [x]  Old records  [x]  Radiology   [x]  EKG/Telemetry   []  Microbiology    []  Cardiology/Vascular   []  Pathology          Active Hospital Problems    Diagnosis  POA   • **CHF (congestive heart failure) [I50.9]  Yes   • Delirium due to another medical condition [F05]  Unknown   • Nonrheumatic aortic valve insufficiency [I35.1]  Yes   • Coronary artery disease involving native coronary artery of native heart without angina pectoris [I25.10]  Yes   • MAXINE on CPAP [G47.33, Z99.89]  Not Applicable   • Essential hypertension [I10]  Yes   • Atrial fibrillation [I48.91]  Yes       Confusion  Hallucinations  Memory issues  -No formal diagnosis though suspect he has early dementia.  Visual hallucinations would favor Lewy body dementia, though do not see any signs of parkinsonism.  Patient is unfortunately very very high risk for delirium and we need to minimize this is much as possible  -Check TSH, folate, B12 for completion  -Delirium precautions- ordered in chart and discussed with wife  -consider addition of Seroquel qhs if behaviors continue  -as needed Zyprexa for severe agitation    Acute on chronic systolic heart failure  -Per Cardiology  -repeat TTE  -Coreg 6.25 mg twice daily, Lasix 40 mg twice daily    Permanent A-fib  -RC: Metoprolol 50 mg twice daily  -AC: Apixaban    Rheumatoid arthritis  -Sulfasalazine 1000 mg twice daily, cetraxate 20 mg weekly, prednisone 10 mg      Thank you very much for asking [facility] to be involved in this patient's care. We will follow along with you.      Andres Cano MD  [facility]  04/28/23  10:40 EDT

## 2023-04-28 NOTE — CASE MANAGEMENT/SOCIAL WORK
Discharge Planning Assessment  [facility]     Patient Name: Nito Corado  MRN: [id number]  Today's Date: 4/28/2023    Admit Date: 4/27/2023    Plan: Home with family support, family to transport   Discharge Needs Assessment     Row Name 04/28/23 1050       Living Environment    People in Home spouse    Name(s) of People in Home Yisel Corado [phone]    Current Living Arrangements home    Potentially Unsafe Housing Conditions none    Primary Care Provided by self    Provides Primary Care For no one    Family Caregiver if Needed spouse    Family Caregiver Names Venkatajesika Corado    Quality of Family Relationships helpful;involved;supportive    Able to Return to Prior Arrangements yes       Resource/Environmental Concerns    Resource/Environmental Concerns none    Transportation Concerns none       Food Insecurity    Within the past 12 months, you worried that your food would run out before you got the money to buy more. Never true    Within the past 12 months, the food you bought just didn't last and you didn't have money to get more. Never true       Transition Planning    Patient/Family Anticipates Transition to home;home with family    Patient/Family Anticipated Services at Transition none    Transportation Anticipated family or friend will provide       Discharge Needs Assessment    Readmission Within the Last 30 Days no previous admission in last 30 days    Equipment Currently Used at Home cane, quad;walker, rolling    Concerns to be Addressed no discharge needs identified;denies needs/concerns at this time    Anticipated Changes Related to Illness none    Equipment Needed After Discharge none    Provided Post Acute Provider List? N/A    Provided Post Acute Provider Quality & Resource List? N/A               Discharge Plan     Row Name 04/28/23 1051       Plan    Plan Home with family support, family to transport    Patient/Family in Agreement with Plan yes    Provided Post Acute Provider List? N/A     Provided Post Acute Provider Quality & Resource List? N/A    Plan Comments Met with pt and family at bedside. Permission obtained to speak to spouse and son in front of pt. Explained role of . Face sheet verified. Pts PCP is Jean Weaver. Pt denies any difficulty paying for medications and obtains medications at [facility] in [place]. Pt lives with spouse, who assist at times with care needs, otherwise pt is able to perform his own ADL's. Pt ambulates in the home with his cane, and for long distances with his walker. Pt does have some periods of confusion according to family, but during hospitalizations, confused episodes are more pronounced. Pt is more appropriate acting at this time, and ia alert to self, place and time.  Pt has used home health in the past, but it has been several years ago, and family cannot remember which agency. Pt family will transport  home. Explained that CCP  would follow to assess for discharge needs              Continued Care and Services - Admitted Since 4/27/2023    Coordination has not been started for this encounter.       Expected Discharge Date and Time     Expected Discharge Date Expected Discharge Time    May 1, 2023          Demographic Summary    No documentation.                Functional Status    No documentation.                Psychosocial    No documentation.                Abuse/Neglect    No documentation.                Legal    No documentation.                Substance Abuse    No documentation.                Patient Forms    No documentation.                   Maria Guadalupe Moralez RN

## 2023-04-28 NOTE — SIGNIFICANT NOTE
04/28/23 0906   OTHER   Discipline physical therapist   Rehab Time/Intention   Session Not Performed other (see comments)  (pt currently in soft wrist restraints; admit this AM with no work-up completed; per RN hold today and f/u tomorrow)   Therapy Assessment/Plan (PT)   Criteria for Skilled Interventions Met (PT) yes   Recommendation   PT - Next Appointment 04/29/23

## 2023-04-29 LAB — POTASSIUM SERPL-SCNC: 4.1 MMOL/L (ref 3.5–5.2)

## 2023-04-29 PROCEDURE — 25010000002 ENOXAPARIN PER 10 MG: Performed by: INTERNAL MEDICINE

## 2023-04-29 PROCEDURE — 63710000001 PREDNISONE PER 5 MG: Performed by: INTERNAL MEDICINE

## 2023-04-29 PROCEDURE — 97161 PT EVAL LOW COMPLEX 20 MIN: CPT

## 2023-04-29 PROCEDURE — 63710000001 METHOTREXATE PER 2.5 MG: Performed by: INTERNAL MEDICINE

## 2023-04-29 PROCEDURE — 99233 SBSQ HOSP IP/OBS HIGH 50: CPT | Performed by: INTERNAL MEDICINE

## 2023-04-29 PROCEDURE — 25010000002 FUROSEMIDE PER 20 MG: Performed by: INTERNAL MEDICINE

## 2023-04-29 PROCEDURE — 84132 ASSAY OF SERUM POTASSIUM: CPT | Performed by: STUDENT IN AN ORGANIZED HEALTH CARE EDUCATION/TRAINING PROGRAM

## 2023-04-29 RX ORDER — LOSARTAN POTASSIUM 25 MG/1
12.5 TABLET ORAL
Status: DISCONTINUED | OUTPATIENT
Start: 2023-04-29 | End: 2023-05-01

## 2023-04-29 RX ORDER — ENOXAPARIN SODIUM 100 MG/ML
1 INJECTION SUBCUTANEOUS EVERY 12 HOURS
Status: COMPLETED | OUTPATIENT
Start: 2023-04-29 | End: 2023-04-30

## 2023-04-29 RX ADMIN — Medication 12.5 MG: at 18:23

## 2023-04-29 RX ADMIN — Medication 10 ML: at 18:43

## 2023-04-29 RX ADMIN — SULFASALAZINE 1000 MG: 500 TABLET ORAL at 09:05

## 2023-04-29 RX ADMIN — PANTOPRAZOLE SODIUM 40 MG: 40 TABLET, DELAYED RELEASE ORAL at 05:17

## 2023-04-29 RX ADMIN — SULFASALAZINE 1000 MG: 500 TABLET ORAL at 20:25

## 2023-04-29 RX ADMIN — ENOXAPARIN SODIUM 80 MG: 100 INJECTION SUBCUTANEOUS at 18:22

## 2023-04-29 RX ADMIN — Medication 10 ML: at 20:25

## 2023-04-29 RX ADMIN — PREDNISONE 10 MG: 10 TABLET ORAL at 09:05

## 2023-04-29 RX ADMIN — METHOTREXATE 20 MG: 2.5 TABLET ORAL at 09:04

## 2023-04-29 RX ADMIN — FUROSEMIDE 40 MG: 10 INJECTION, SOLUTION INTRAMUSCULAR; INTRAVENOUS at 06:32

## 2023-04-29 RX ADMIN — CARVEDILOL 6.25 MG: 6.25 TABLET, FILM COATED ORAL at 18:22

## 2023-04-29 RX ADMIN — Medication 1 MG: at 09:05

## 2023-04-29 RX ADMIN — LOSARTAN POTASSIUM 12.5 MG: 25 TABLET, FILM COATED ORAL at 18:22

## 2023-04-29 RX ADMIN — CARVEDILOL 6.25 MG: 6.25 TABLET, FILM COATED ORAL at 09:05

## 2023-04-29 RX ADMIN — FUROSEMIDE 40 MG: 10 INJECTION, SOLUTION INTRAMUSCULAR; INTRAVENOUS at 18:22

## 2023-04-29 RX ADMIN — APIXABAN 5 MG: 5 TABLET, FILM COATED ORAL at 09:05

## 2023-04-29 NOTE — THERAPY EVALUATION
Patient Name: Nito Corado  : 1940    MRN: [id number]                              Today's Date: 2023       Admit Date: 2023    Visit Dx: No diagnosis found.  Patient Active Problem List   Diagnosis   • Atrial fibrillation   • Overweight on examination   • Dyspnea   • Essential hypertension   • Other fatigue   • MAXINE on CPAP   • Coronary artery disease involving native coronary artery of native heart without angina pectoris   • Nonrheumatic aortic valve insufficiency   • CHF (congestive heart failure)   • Delirium due to another medical condition     Past Medical History:   Diagnosis Date   • Allergic rhinitis    • Atrial fibrillation    • Benign hypertension    • History of bleeding ulcers    • Marginal perforation of tympanic membrane    • Otorrhea    • PAF (paroxysmal atrial fibrillation)      Past Surgical History:   Procedure Laterality Date   • CARDIAC CATHETERIZATION N/A 2020    Procedure: Coronary angiography;  Surgeon: Nita Kumar MD;  Location:  [facility] CATH INVASIVE LOCATION;  Service: Cardiovascular;  Laterality: N/A;   • CARDIAC CATHETERIZATION N/A 2020    Procedure: Left heart cath;  Surgeon: Nita Kumar MD;  Location: [facility] CATH INVASIVE LOCATION;  Service: Cardiovascular;  Laterality: N/A;   • CARDIAC CATHETERIZATION N/A 2020    Procedure: Left ventriculography;  Surgeon: Nita Kumar MD;  Location:  [facility] CATH INVASIVE LOCATION;  Service: Cardiovascular;  Laterality: N/A;   • CARDIAC CATHETERIZATION  2020    Procedure: Functional Flow Chrisman;  Surgeon: Nita Kumar MD;  Location:  [facility] CATH INVASIVE LOCATION;  Service: Cardiovascular;;   • EXTERNAL EAR SURGERY Left    • KNEE SURGERY        General Information     Row Name 23 1530          Physical Therapy Time and Intention    Document Type evaluation  -SV     Mode of Treatment individual therapy;physical therapy  -SV     Row Name 23 1530          General Information    Patient  Profile Reviewed yes  -SV     Prior Level of Function independent:  reports cane use in house and rwx for community distances  -SV     Existing Precautions/Restrictions fall;oxygen therapy device and L/min  -SV     Barriers to Rehab cognitive status;previous functional deficit  -SV     Row Name 04/29/23 1530          Living Environment    People in Home spouse  -SV     Row Name 04/29/23 1530          Stairs Within Home, Primary    Stairs Comment, Within Home, Primary has ramp  -SV     Row Name 04/29/23 1530          Cognition    Orientation Status (Cognition) oriented to;person;place  needed oriented to time  -SV     Row Name 04/29/23 1530          Safety Issues, Functional Mobility    Impairments Affecting Function (Mobility) balance;endurance/activity tolerance;strength;shortness of breath  -SV           User Key  (r) = Recorded By, (t) = Taken By, (c) = Cosigned By    Initials Name Provider Type    SV Alysia Umaña, PT Physical Therapist               Mobility     Row Name 04/29/23 1606          Bed Mobility    Bed Mobility supine-sit;sit-supine  -SV     Supine-Sit [place] (Bed Mobility) minimum assist (75% patient effort);moderate assist (50% patient effort)  -SV     Sit-Supine [place] (Bed Mobility) contact guard  -SV     Assistive Device (Bed Mobility) bed rails;head of bed elevated  -SV     Row Name 04/29/23 1606          Sit-Stand Transfer    Sit-Stand [place] (Transfers) minimum assist (75% patient effort);moderate assist (50% patient effort)  -SV     Assistive Device (Sit-Stand Transfers) walker, front-wheeled  -SV     Row Name 04/29/23 1606          Gait/Stairs (Locomotion)    [place] Level (Gait) contact guard;minimum assist (75% patient effort)  -SV     Assistive Device (Gait) walker, front-wheeled  -SV     Distance in Feet (Gait) 7', seated rest, 12' seated rest, 12' shuffle pattern, flexed posture, mild unsteadiness, on O2  -SV           User Key  (r) = Recorded By, (t) = Taken  By, (c) = Cosigned By    Initials Name Provider Type     Alysia Umaña, PT Physical Therapist               Obj/Interventions     Row Name 04/29/23 1607          Range of Motion Comprehensive    General Range of Motion no range of motion deficits identified  -[facility] Name 04/29/23 1607          Strength Comprehensive (MMT)    Comment, General Manual Muscle Testing (MMT) Assessment gross general strength obs at >3/5 with rom , no MMT today  -     Row Name 04/29/23 1607          Balance    Balance Assessment sitting static balance;standing static balance  -SV     Static Sitting Balance standby assist  -SV     Position, Sitting Balance sitting edge of bed  -SV     Static Standing Balance contact guard  -SV     Position/Device Used, Standing Balance walker, rolling  -SV     Row Name 04/29/23 1607          Sensory Assessment (Somatosensory)    Sensory Assessment (Somatosensory) not tested  -           User Key  (r) = Recorded By, (t) = Taken By, (c) = Cosigned By    Initials Name Provider Type    SV Alysia Umaña, PT Physical Therapist               Goals/Plan     Row Name 04/29/23 1609          Bed Mobility Goal 1 (PT)    Activity/Assistive Device (Bed Mobility Goal 1, PT) sit to supine/supine to sit  -SV     [place] Level/Cues Needed (Bed Mobility Goal 1, PT) independent  -SV     Time Frame (Bed Mobility Goal 1, PT) 10 days  -[facility] Name 04/29/23 1609          Transfer Goal 1 (PT)    Activity/Assistive Device (Transfer Goal 1, PT) sit-to-stand/stand-to-sit;walker, rolling  -SV     [place] Level/Cues Needed (Transfer Goal 1, PT) independent  -SV     Time Frame (Transfer Goal 1, PT) 10 days  -SV     Row Name 04/29/23 1609          Gait Training Goal 1 (PT)    Activity/Assistive Device (Gait Training Goal 1, PT) gait (walking locomotion);walker, rolling;cane, straight  -SV     [place] Level (Gait Training Goal 1, PT) independent  -SV     Distance (Gait Training Goal 1, PT) 200'  -SV      Time Frame (Gait Training Goal 1, PT) 10 days  -SV     Row Name 04/29/23 1609          Therapy Assessment/Plan (PT)    Planned Therapy Interventions (PT) balance training;bed mobility training;gait training;home exercise program;transfer training;strengthening;stair training;patient/family education  -SV           User Key  (r) = Recorded By, (t) = Taken By, (c) = Cosigned By    Initials Name Provider Type    SV Alysia Umaña, PT Physical Therapist               Clinical Impression     Row Name 04/29/23 1608          Pain    Pretreatment Pain Rating 0/10 - no pain  -SV     Posttreatment Pain Rating 0/10 - no pain  -SV     Row Name 04/29/23 1608          Plan of Care Review    Plan of Care Reviewed With patient  -SV     Row Name 04/29/23 1608          Therapy Assessment/Plan (PT)    Patient/Family Therapy Goals Statement (PT) indep amb , return home  -SV     Criteria for Skilled Interventions Met (PT) yes  -SV     Therapy Frequency (PT) 6 times/wk  -SV     Predicted Duration of Therapy Intervention (PT) 2-4 weeks  -SV     Row Name 04/29/23 1608          Vital Signs    Pre SpO2 (%) 95  -SV     O2 Delivery Pre Treatment supplemental O2  -SV     Intra SpO2 (%) 95  -SV     O2 Delivery Intra Treatment supplemental O2  -SV     Post SpO2 (%) 93  -SV     O2 Delivery Post Treatment supplemental O2  -SV     Pre Patient Position Supine  -SV     Intra Patient Position Standing  -SV     Post Patient Position Supine  -SV     Row Name 04/29/23 1608          Positioning and Restraints    Pre-Treatment Position in bed  -SV     Post Treatment Position bed  -SV     In Bed notified nsg;call light within reach;encouraged to call for assist;exit alarm on;fowlers  -SV           User Key  (r) = Recorded By, (t) = Taken By, (c) = Cosigned By    Initials Name Provider Type    SV Alysia Umaña, PT Physical Therapist               Outcome Measures     Row Name 04/29/23 1610 04/29/23 0905       How much help from another person do you  currently need...    Turning from your back to your side while in flat bed without using bedrails? 3  -SV 3  -GP    Moving from lying on back to sitting on the side of a flat bed without bedrails? 3  -SV 3  -GP    Moving to and from a bed to a chair (including a wheelchair)? 3  -SV 3  -GP    Standing up from a chair using your arms (e.g., wheelchair, bedside chair)? 3  -SV 3  -GP    Climbing 3-5 steps with a railing? 2  -SV 2  -GP    To walk in hospital room? 2  -SV 2  -GP    AM-PAC 6 Clicks Score (PT) 16  -SV 16  -GP    Highest level of mobility 5 --> Static standing  -SV 5 --> Static standing  -GP          User Key  (r) = Recorded By, (t) = Taken By, (c) = Cosigned By    Initials Name Provider Type    GP Sarah Armstrong, RN Registered Nurse     Alysia Umaña, PT Physical Therapist                             Physical Therapy Education     Title: PT OT SLP Therapies (In Progress)     Topic: Physical Therapy (In Progress)     Point: Mobility training (In Progress)     Learning Progress Summary           Patient Eager, E, NR by  at 4/29/2023 1610                   Point: Home exercise program (In Progress)     Learning Progress Summary           Patient Eager, E, NR by  at 4/29/2023 1610                               User Key     Initials Effective Dates Name Provider Type Discipline     06/16/21 -  Alysia Umaña, PT Physical Therapist PT              PT Recommendation and Plan  Planned Therapy Interventions (PT): balance training, bed mobility training, gait training, home exercise program, transfer training, strengthening, stair training, patient/family education  Plan of Care Reviewed With: patient     Time Calculation:    PT Charges     Row Name 04/29/23 1615             Time Calculation    Start Time 1530  -      Stop Time 1550  -      Time Calculation (min) 20 min  -      PT Received On 04/29/23  -      PT - Next Appointment 05/01/23  -      PT Goal Re-Cert Due Date 05/10/23  -             User Key  (r) = Recorded By, (t) = Taken By, (c) = Cosigned By    Initials Name Provider Type    SV Alysia Umaña, PT Physical Therapist              Therapy Charges for Today     Code Description Service Date Service Provider Modifiers Qty    46290733832 HC PT EVAL LOW COMPLEXITY 2 4/29/2023 Alysia Umaña, PT GP 1          PT G-Codes  AM-PAC 6 Clicks Score (PT): 16  PT Discharge Summary  Anticipated Discharge Disposition (PT): home with assist, home with home health, skilled nursing facility    Alysia Umaña, PT  4/29/2023

## 2023-04-29 NOTE — PROGRESS NOTES
LOS: 1 day   Patient Care Team:  Jean Weaver MD as PCP - General (Family Medicine)    Chief Complaint: Follow-up acute CHF, coronary artery disease, persistent atrial fibrillation.    Interval History: He diuresed well, and does feel better.  No chest pain.  Shortness of breath has improved.    Vital Signs:  Temp:  [97 °F (36.1 °C)-97.7 °F (36.5 °C)] 97.7 °F (36.5 °C)  Heart Rate:  [73-88] 88  Resp:  [18] 18  BP: (129-160)/(62-95) 129/62    Intake/Output Summary (Last 24 hours) at 4/29/2023 1623  Last data filed at 4/29/2023 0504  Gross per 24 hour   Intake 180 ml   Output --   Net 180 ml       Physical Exam:   General Appearance:    No acute distress, alert and oriented x4   Lungs:     Rales at the bases bilaterally.    Heart:    Irregularly irregular rhythm and normal rate.  No murmurs, gallops, or rubs.   Abdomen:     Soft, nontender, nondistended.    Extremities:   No clubbing, cyanosis, or edema.     Results Review:    Results from last 7 days   Lab Units 04/29/23  0925 04/28/23  1034   SODIUM mmol/L  --  139   POTASSIUM mmol/L 4.1 3.1*   CHLORIDE mmol/L  --  103   CO2 mmol/L  --  26.6   BUN mg/dL  --  15   CREATININE mg/dL  --  0.95   GLUCOSE mg/dL  --  101*   CALCIUM mg/dL  --  9.6         Results from last 7 days   Lab Units 04/28/23  1034   WBC 10*3/mm3 6.50   HEMOGLOBIN g/dL 13.1   HEMATOCRIT % 39.8   PLATELETS 10*3/mm3 207         Results from last 7 days   Lab Units 04/28/23  1034   CHOLESTEROL mg/dL 159         Results from last 7 days   Lab Units 04/28/23  1034   CHOLESTEROL mg/dL 159   TRIGLYCERIDES mg/dL 111   HDL CHOL mg/dL 33*   LDL CHOL mg/dL 106*       I reviewed the patient's new clinical results.        Assessment:  1.  Acute combined CHF  2.  Cardiomyopathy, ejection fraction 20 to 25%  3.  Aortic insufficiency  4.  Persistent atrial fibrillation  5.  Coronary artery disease, nonobstructive in 2020 (mid LAD 50%, large ramus 60 to 70% proximal)  6.  Rheumatoid arthritis  7.   Hypertension  8.  Altered mental status, likely delirium    Plan:  -Long discussion with the patient and his wife today.  I reviewed the limited echocardiogram from yesterday.  The ejection fraction is down to 20 to 25%, and previously was normal.      -There is discrepancy on the aortic insufficiency between the [facility] echo from before his hospitalization and this echocardiogram.  The [facility] echo suggested more significant aortic insufficiency.  I recommended a DENG on Monday to further delineate this.    -Given the newly diagnosed cardiomyopathy, I also recommended a right and left heart catheterization on Monday as well.  He did have disease in his mid LAD of up to 50% and a large ramus with 60 to 70% proximal disease in 2020 on a prior heart catheterization.    -Continue IV Lasix at 40 mg twice daily.  Diuresing well.    -Continue Coreg 6.25 mg twice daily.  Add spironolactone 12.5 mg/day and losartan 12.5 mg/day.  If his renal function tolerates this, could consider transitioning to Entresto in the future.    -Stop Eliquis in preparation for cath on Monday.  Start Lovenox.  Hold the Lovenox after tomorrow evening.    -Atrial fibrillation rate seems controlled.    Jeff Morales MD  04/29/23  16:23 EDT

## 2023-04-29 NOTE — PLAN OF CARE
Goal Outcome Evaluation:  Plan of Care Reviewed With: patient           Outcome Evaluation: VSS, no c/o pain. Pt confused at times, but cooperative. Pt remains on 2LNC. Pt appeared to sleep between care. Will CTM, safety maintained.   Diuretic in Use: IV lasix  Response to Diuretics (Output greater than intake): yes  Daily Weight (up or down): down 4 kg  O2 Requirements: 2LNC  Functional Status (Activity level, tolerance and respiratory symptoms): SOA with exertion  Discharge Plans: TBD

## 2023-04-29 NOTE — PLAN OF CARE
"Goal Outcome Evaluation:  Plan of Care Reviewed With: patient         Pt admit due to delerium, confusion, hallucinations, CHF. PMH: dementia, afib, htn, CAD, AVR, RA. Pt live with wife and reported use of cane inside home and rwx in community. He reports newly installed ramp into his home and also reports \" 30-40 steps\". Pt alert and very cooperative today. Min asst for sup to sit. Min/mod for STS to rwx. Pt amb around bed several times (12') with rwx and min asst due to unsteadiness. Pt will likely benefit from cont skilled PT to address general weakness,  impaired endurance, impaired balance and impaired mobility in all areas. Possible snf vs home with HHPT depending on progress.      "

## 2023-04-29 NOTE — PROGRESS NOTES
Name: Nito Corado ADMIT: 2023   : 1940  PCP: Jean Weaver MD    MRN: [id number] LOS: 1 days   AGE/SEX: 82 y.o. male  ROOM: [facility]     Subjective   Subjective   No new issues this morning.  Dyspnea is a bit improved.    Objective   Objective   Vital Signs  Temp:  [97 °F (36.1 °C)-97.7 °F (36.5 °C)] 97.7 °F (36.5 °C)  Heart Rate:  [73-82] 73  Resp:  [18-19] 18  BP: (146-160)/(86-95) 146/95  SpO2:  [92 %-96 %] 93 %  on  Flow (L/min):  [2] 2;   Device (Oxygen Therapy): nasal cannula  Body mass index is 24.18 kg/m².  Physical Exam  Constitutional:       Appearance: Normal appearance. He is ill-appearing.   Cardiovascular:      Rate and Rhythm: Normal rate. Rhythm irregular.      Pulses: Normal pulses.      Heart sounds: No murmur heard.  Pulmonary:      Effort: Pulmonary effort is normal. No respiratory distress.      Breath sounds: No wheezing.   Abdominal:      General: Abdomen is flat. There is no distension.      Palpations: Abdomen is soft.   Skin:     General: Skin is warm.      Coloration: Skin is not jaundiced.   Neurological:      Mental Status: He is alert.   Psychiatric:         Mood and Affect: Mood normal.         Results Review     I reviewed the patient's new clinical results.  Results from last 7 days   Lab Units 23  1034   WBC 10*3/mm3 6.50   HEMOGLOBIN g/dL 13.1   PLATELETS 10*3/mm3 207     Results from last 7 days   Lab Units 23  1034 23  2211   SODIUM mmol/L 139  --    POTASSIUM mmol/L 3.1*  --    CHLORIDE mmol/L 103  --    CO2 mmol/L 26.6  --    BUN mg/dL 15  --    CREATININE mg/dL 0.95 1.04   GLUCOSE mg/dL 101*  --    EGFR mL/min/1.73 80.9 72.6     Results from last 7 days   Lab Units 23  1034   ALBUMIN g/dL 3.4*   BILIRUBIN mg/dL 0.5   ALK PHOS U/L 59   AST (SGOT) U/L 9   ALT (SGPT) U/L 7     Results from last 7 days   Lab Units 23  1034   CALCIUM mg/dL 9.6   ALBUMIN g/dL 3.4*       No results found for: HGBA1C, POCGLU    No radiology  results for the last day  Scheduled Medications  apixaban, 5 mg, Oral, BID  carvedilol, 6.25 mg, Oral, BID With Meals  folic acid, 1 mg, Oral, Daily  furosemide, 40 mg, Intravenous, Q12H  methotrexate, 20 mg, Oral, Weekly  pantoprazole, 40 mg, Oral, Q AM  predniSONE, 10 mg, Oral, Daily With Breakfast  sodium chloride, 10 mL, Intravenous, Q12H  sulfaSALAzine, 1,000 mg, Oral, BID    Infusions   Diet  Diet: Cardiac Diets; Healthy Heart (2-3 Na+); Texture: Regular Texture (IDDSI 7); Fluid Consistency: Thin (IDDSI 0)       Assessment/Plan     Active Hospital Problems    Diagnosis  POA   • **CHF (congestive heart failure) [I50.9]  Yes   • Delirium due to another medical condition [F05]  Unknown   • Nonrheumatic aortic valve insufficiency [I35.1]  Yes   • Coronary artery disease involving native coronary artery of native heart without angina pectoris [I25.10]  Yes   • MAXINE on CPAP [G47.33, Z99.89]  Not Applicable   • Essential hypertension [I10]  Yes   • Atrial fibrillation [I48.91]  Yes      Resolved Hospital Problems   No resolved problems to display.       82 y.o. male admitted with CHF (congestive heart failure).      04/29/23  No issues overnight per nursing.  Continue delirium precautions.  Noted significant decline in EF on new echo, Cardiology primary    Confusion  Hallucinations  Memory issues  -No formal diagnosis though suspect he has early dementia.  Visual hallucinations would favor Lewy body dementia, though do not see any signs of parkinsonism.  Patient is unfortunately very very high risk for delirium and we need to minimize this is much as possible  -Check TSH, folate, B12 for completion  -Delirium precautions- ordered in chart and discussed with wife  -consider addition of Seroquel qhs if behaviors continue  -as needed Zyprexa for severe agitation     Acute on chronic systolic heart failure  -Per Cardiology  -TTE (4/20/2023): 21 to 25%; indeterminate diastolic function; mild AR; trace MR     -Significantly  decreased from reported EF at outside facility  -Coreg 6.25 mg twice daily, Lasix 40 mg twice daily     Permanent A-fib  -RC: Metoprolol 50 mg twice daily  -AC: Apixaban     Rheumatoid arthritis  -Sulfasalazine 1000 mg twice daily, cetraxate 20 mg weekly, prednisone 10 mg      · Eliquis  for DVT prophylaxis.  · Full code.  · Discussed with patient and nursing staff.      Andres Cano MD  [facility]  04/29/23  07:47 EDT

## 2023-04-29 NOTE — PROGRESS NOTES
"[facility] Clinical Pharmacy Services: Enoxaparin Consult    Nito Corado has a pharmacy consult to dose full-dose enoxaparin per Dr. Jeff Morales's request.     Indication: A Fib - requiring full anticoagulation  Home Anticoagulation: Apixaban     Relevant clinical data and objective history reviewed:  82 y.o. male 182 cm (71.65\") 80.1 kg (176 lb 9.4 oz)   Body mass index is 24.18 kg/m².   Results from last 7 days   Lab Units 04/28/23  1034   PLATELETS 10*3/mm3 207     Estimated Creatinine Clearance: 70.3 mL/min (by C-G formula based on SCr of 0.95 mg/dL).    Assessment/Plan    Will start patient on  80 mg (1mg/kg) subcutaneous every 12 hours, adjusted for renal function. Consult order will be discontinued but pharmacy will continue to follow.     Yan Oconnor [facility]  Clinical Pharmacist   "

## 2023-04-29 NOTE — PLAN OF CARE
Goal Outcome Evaluation:  Plan of Care Reviewed With: patient, spouse        Progress: no change  Outcome Evaluation: VSS. Occasional confusion but easily reoriented. No behaviors thus far this shift. Wife at bedside most of the day. Eliquis dc'd and lovenox added Q12 hrs in preparation of DENG and possible heart cath on Monday. Lasix IV q 12 hrs. Weight down. Safety measures in place.

## 2023-04-30 LAB
ANION GAP SERPL CALCULATED.3IONS-SCNC: 12.1 MMOL/L (ref 5–15)
BUN SERPL-MCNC: 30 MG/DL (ref 8–23)
BUN/CREAT SERPL: 24.6 (ref 7–25)
CALCIUM SPEC-SCNC: 9.5 MG/DL (ref 8.6–10.5)
CHLORIDE SERPL-SCNC: 102 MMOL/L (ref 98–107)
CO2 SERPL-SCNC: 27.9 MMOL/L (ref 22–29)
CREAT SERPL-MCNC: 1.22 MG/DL (ref 0.76–1.27)
DEPRECATED RDW RBC AUTO: 51.3 FL (ref 37–54)
EGFRCR SERPLBLD CKD-EPI 2021: 59.9 ML/MIN/1.73
ERYTHROCYTE [DISTWIDTH] IN BLOOD BY AUTOMATED COUNT: 15.6 % (ref 12.3–15.4)
GLUCOSE SERPL-MCNC: 117 MG/DL (ref 65–99)
HCT VFR BLD AUTO: 43.3 % (ref 37.5–51)
HGB BLD-MCNC: 14.4 G/DL (ref 13–17.7)
MAGNESIUM SERPL-MCNC: 2.1 MG/DL (ref 1.6–2.4)
MCH RBC QN AUTO: 30.4 PG (ref 26.6–33)
MCHC RBC AUTO-ENTMCNC: 33.3 G/DL (ref 31.5–35.7)
MCV RBC AUTO: 91.5 FL (ref 79–97)
PLATELET # BLD AUTO: 210 10*3/MM3 (ref 140–450)
PMV BLD AUTO: 9.7 FL (ref 6–12)
POTASSIUM SERPL-SCNC: 3.8 MMOL/L (ref 3.5–5.2)
RBC # BLD AUTO: 4.73 10*6/MM3 (ref 4.14–5.8)
SODIUM SERPL-SCNC: 142 MMOL/L (ref 136–145)
WBC NRBC COR # BLD: 7.08 10*3/MM3 (ref 3.4–10.8)

## 2023-04-30 PROCEDURE — 80048 BASIC METABOLIC PNL TOTAL CA: CPT | Performed by: INTERNAL MEDICINE

## 2023-04-30 PROCEDURE — 85027 COMPLETE CBC AUTOMATED: CPT | Performed by: INTERNAL MEDICINE

## 2023-04-30 PROCEDURE — 83735 ASSAY OF MAGNESIUM: CPT | Performed by: INTERNAL MEDICINE

## 2023-04-30 PROCEDURE — 99232 SBSQ HOSP IP/OBS MODERATE 35: CPT | Performed by: INTERNAL MEDICINE

## 2023-04-30 PROCEDURE — 25010000002 ENOXAPARIN PER 10 MG: Performed by: NURSE PRACTITIONER

## 2023-04-30 PROCEDURE — 25010000002 ENOXAPARIN PER 10 MG: Performed by: INTERNAL MEDICINE

## 2023-04-30 PROCEDURE — 25010000002 FUROSEMIDE PER 20 MG: Performed by: INTERNAL MEDICINE

## 2023-04-30 PROCEDURE — 63710000001 PREDNISONE PER 5 MG: Performed by: INTERNAL MEDICINE

## 2023-04-30 RX ADMIN — PREDNISONE 10 MG: 10 TABLET ORAL at 09:45

## 2023-04-30 RX ADMIN — ENOXAPARIN SODIUM 80 MG: 100 INJECTION SUBCUTANEOUS at 06:46

## 2023-04-30 RX ADMIN — FUROSEMIDE 40 MG: 10 INJECTION, SOLUTION INTRAMUSCULAR; INTRAVENOUS at 06:46

## 2023-04-30 RX ADMIN — LOSARTAN POTASSIUM 12.5 MG: 25 TABLET, FILM COATED ORAL at 09:45

## 2023-04-30 RX ADMIN — Medication 12.5 MG: at 09:45

## 2023-04-30 RX ADMIN — Medication 10 ML: at 09:45

## 2023-04-30 RX ADMIN — SULFASALAZINE 1000 MG: 500 TABLET ORAL at 09:45

## 2023-04-30 RX ADMIN — Medication 10 ML: at 20:38

## 2023-04-30 RX ADMIN — CARVEDILOL 6.25 MG: 6.25 TABLET, FILM COATED ORAL at 09:45

## 2023-04-30 RX ADMIN — PANTOPRAZOLE SODIUM 40 MG: 40 TABLET, DELAYED RELEASE ORAL at 06:46

## 2023-04-30 RX ADMIN — Medication 1 MG: at 09:45

## 2023-04-30 RX ADMIN — CARVEDILOL 6.25 MG: 6.25 TABLET, FILM COATED ORAL at 18:12

## 2023-04-30 RX ADMIN — ENOXAPARIN SODIUM 80 MG: 100 INJECTION SUBCUTANEOUS at 18:12

## 2023-04-30 RX ADMIN — SULFASALAZINE 1000 MG: 500 TABLET ORAL at 20:38

## 2023-04-30 NOTE — PLAN OF CARE
Goal Outcome Evaluation:  Plan of Care Reviewed With: patient           Outcome Evaluation: VSS, no c/o of pain. Pt appeared to sleep well between care. Pt confused at times on date, but reoriented quickly. Will CTM, safety maintained.  Diuretic in Use: IV lasix  Response to Diuretics (Output greater than intake): yes  Daily Weight (up or down): up--unsure if accurate  O2 Requirements: 2-3LNC  Functional Status (Activity level, tolerance and respiratory symptoms): SOA with activity  Discharge Plans: TBD

## 2023-04-30 NOTE — PROGRESS NOTES
Name: Nito Corado ADMIT: 2023   : 1940  PCP: Jean Weaver MD    MRN: [id number] LOS: 2 days   AGE/SEX: 82 y.o. male  ROOM: [facility]     Subjective   Subjective   No issues overnight.  Discussed with nursing and he has had no behavior issues or episodes of confusion.    Objective   Objective   Vital Signs  Temp:  [97.4 °F (36.3 °C)-97.8 °F (36.6 °C)] 97.5 °F (36.4 °C)  Heart Rate:  [81-88] 83  Resp:  [18] 18  BP: (123-143)/(67-96) 123/67  SpO2:  [93 %-94 %] 93 %  on  Flow (L/min):  [2] 2;   Device (Oxygen Therapy): nasal cannula  Body mass index is 24.44 kg/m².  Physical Exam  Constitutional:       Appearance: Normal appearance. He is ill-appearing.   Cardiovascular:      Rate and Rhythm: Normal rate. Rhythm irregular.      Pulses: Normal pulses.      Heart sounds: No murmur heard.  Pulmonary:      Effort: Pulmonary effort is normal. No respiratory distress.      Breath sounds: No wheezing.   Abdominal:      General: Abdomen is flat. There is no distension.      Palpations: Abdomen is soft.   Skin:     General: Skin is warm.      Coloration: Skin is not jaundiced.   Neurological:      Mental Status: He is alert.   Psychiatric:         Mood and Affect: Mood normal.         Results Review     I reviewed the patient's new clinical results.  Results from last 7 days   Lab Units 23  04223  1034   WBC 10*3/mm3 7.08 6.50   HEMOGLOBIN g/dL 14.4 13.1   PLATELETS 10*3/mm3 210 207     Results from last 7 days   Lab Units 23  0429 23  0925 23  1034 23  2211   SODIUM mmol/L 142  --  139  --    POTASSIUM mmol/L 3.8 4.1 3.1*  --    CHLORIDE mmol/L 102  --  103  --    CO2 mmol/L 27.9  --  26.6  --    BUN mg/dL 30*  --  15  --    CREATININE mg/dL 1.22  --  0.95 1.04   GLUCOSE mg/dL 117*  --  101*  --    EGFR mL/min/1.73 59.9*  --  80.9 72.6     Results from last 7 days   Lab Units 23  1034   ALBUMIN g/dL 3.4*   BILIRUBIN mg/dL 0.5   ALK PHOS U/L 59   AST (SGOT)  U/L 9   ALT (SGPT) U/L 7     Results from last 7 days   Lab Units 04/30/23  0429 04/28/23  1034   CALCIUM mg/dL 9.5 9.6   ALBUMIN g/dL  --  3.4*   MAGNESIUM mg/dL 2.1  --        No results found for: HGBA1C, POCGLU    No radiology results for the last day  Scheduled Medications  carvedilol, 6.25 mg, Oral, BID With Meals  enoxaparin, 1 mg/kg, Subcutaneous, Q12H  folic acid, 1 mg, Oral, Daily  furosemide, 40 mg, Intravenous, Q12H  losartan, 12.5 mg, Oral, Q24H  methotrexate, 20 mg, Oral, Weekly  pantoprazole, 40 mg, Oral, Q AM  predniSONE, 10 mg, Oral, Daily With Breakfast  sodium chloride, 10 mL, Intravenous, Q12H  spironolactone, 12.5 mg, Oral, Daily  sulfaSALAzine, 1,000 mg, Oral, BID    Infusions   Diet  Diet: Cardiac Diets; Healthy Heart (2-3 Na+); Texture: Regular Texture (IDDSI 7); Fluid Consistency: Thin (IDDSI 0)  NPO Diet NPO Type: Strict NPO       Assessment/Plan     Active Hospital Problems    Diagnosis  POA   • **CHF (congestive heart failure) [I50.9]  Yes   • Delirium due to another medical condition [F05]  Unknown   • Nonrheumatic aortic valve insufficiency [I35.1]  Yes   • Coronary artery disease involving native coronary artery of native heart without angina pectoris [I25.10]  Yes   • MAXINE on CPAP [G47.33, Z99.89]  Not Applicable   • Essential hypertension [I10]  Yes   • Atrial fibrillation [I48.91]  Yes      Resolved Hospital Problems   No resolved problems to display.       82 y.o. male admitted with CHF (congestive heart failure).      04/30/23  Seems to be doing quite well from a mental standpoint.  Cardiology planning on cath tomorrow.    Confusion  Hallucinations  Memory issues  -No formal diagnosis though suspect he has early dementia.  Visual hallucinations would favor Lewy body dementia, though do not see any signs of parkinsonism.  Patient is unfortunately very very high risk for delirium and we need to minimize this is much as possible  -folate, B12 WNL; TSH mildly elevated though fT4  WNL  -Delirium precautions- ordered in chart and discussed with wife  -consider addition of Seroquel qhs if behaviors continue  -as needed Zyprexa for severe agitation      Acute on chronic systolic heart failure  -Per Cardiology  -TTE (4/20/2023): 21 to 25%; indeterminate diastolic function; mild AR; trace MR     -Significantly decreased from reported EF at outside facility  -Coreg 6.25 mg twice daily, Lasix 40 mg twice daily, losartan 12.5 mg, spironolactone 12.5 mg  -Noted plans for cath tomorrow     Permanent A-fib  -RC: Metoprolol 50 mg twice daily  -AC: Apixaban     Rheumatoid arthritis  -Sulfasalazine 1000 mg twice daily, cetraxate 20 mg weekly, prednisone 10 mg      · Eliquis  for DVT prophylaxis.  · Full code.  · Discussed with patient and nursing staff.      Andres Cano MD  [facility]  04/30/23  15:00 EDT

## 2023-04-30 NOTE — PROGRESS NOTES
LOS: 2 days   Patient Care Team:  Jean Weaver MD as PCP - General (Family Medicine)    Chief Complaint: Follow-up acute CHF, coronary artery disease, persistent atrial fibrillation.    Interval History: He diuresed well, and does feel better.  No chest pain.  Shortness of breath has improved.    Vital Signs:  Temp:  [97.4 °F (36.3 °C)-97.8 °F (36.6 °C)] 97.5 °F (36.4 °C)  Heart Rate:  [81-88] 83  Resp:  [18] 18  BP: (123-143)/(67-96) 123/67    Intake/Output Summary (Last 24 hours) at 4/30/2023 1733  Last data filed at 4/30/2023 0645  Gross per 24 hour   Intake 340 ml   Output --   Net 340 ml       Physical Exam:   General Appearance:    No acute distress, alert and oriented x4   Lungs:     Rales at the bases bilaterally.    Heart:    Irregularly irregular rhythm and normal rate.  No murmurs, gallops, or rubs.   Abdomen:     Soft, nontender, nondistended.    Extremities:   No clubbing, cyanosis, or edema.     Results Review:    Results from last 7 days   Lab Units 04/30/23  0429   SODIUM mmol/L 142   POTASSIUM mmol/L 3.8   CHLORIDE mmol/L 102   CO2 mmol/L 27.9   BUN mg/dL 30*   CREATININE mg/dL 1.22   GLUCOSE mg/dL 117*   CALCIUM mg/dL 9.5         Results from last 7 days   Lab Units 04/30/23  0429   WBC 10*3/mm3 7.08   HEMOGLOBIN g/dL 14.4   HEMATOCRIT % 43.3   PLATELETS 10*3/mm3 210         Results from last 7 days   Lab Units 04/28/23  1034   CHOLESTEROL mg/dL 159     Results from last 7 days   Lab Units 04/30/23  0429   MAGNESIUM mg/dL 2.1     Results from last 7 days   Lab Units 04/28/23  1034   CHOLESTEROL mg/dL 159   TRIGLYCERIDES mg/dL 111   HDL CHOL mg/dL 33*   LDL CHOL mg/dL 106*       I reviewed the patient's new clinical results.        Assessment:  1.  Acute combined CHF  2.  Cardiomyopathy, ejection fraction 20 to 25%  3.  Aortic insufficiency  4.  Persistent atrial fibrillation  5.  Coronary artery disease, nonobstructive in 2020 (mid LAD 50%, large ramus 60 to 70% proximal)  6.  Rheumatoid  arthritis  7.  Hypertension  8.  Altered mental status, likely delirium    Plan:  -Ejection fraction is down to 20 to 25%, and previously was normal.    -There is discrepancy on the aortic insufficiency between the [facility] echo from before his hospitalization and this echocardiogram.  The [facility] echo suggested more severe aortic insufficiency.  I recommended a DENG on Monday to further delineate this.    -Plan for right and left heart catheterization tomorrow.  He did have disease in his mid LAD of up to 50% and a large ramus with 60 to 70% proximal disease in 2020 on a prior heart catheterization.    -Stop IV Lasix in preparation for the heart catheterization tomorrow.    -Held Eliquis.  He is currently on Lovenox with the last dose at 6 PM this evening in preparation for the heart catheterization.    -Continue Coreg 6.25 mg twice daily.  Started spironolactone 12.5 mg/day and losartan 12.5 mg/day.  If his renal function tolerates this, could consider transitioning to Entresto in the future.    -Atrial fibrillation rate is controlled in the 80s.    Jeff Morales MD  04/30/23  17:33 EDT

## 2023-04-30 NOTE — PLAN OF CARE
Goal Outcome Evaluation:  Plan of Care Reviewed With: patient, spouse        Progress: no change  Outcome Evaluation: VSS. O2 @ 2L. Up in recliner this afternoon. DENG and R/L cath scheduled for tomorrow. NPO after midnight tonight. Spouse at bedside most of the day. Safety measures in place.

## 2023-05-01 ENCOUNTER — APPOINTMENT (OUTPATIENT)
Dept: CARDIOLOGY | Facility: HOSPITAL | Age: 83
End: 2023-05-01
Payer: MEDICARE

## 2023-05-01 LAB
ANION GAP SERPL CALCULATED.3IONS-SCNC: 11 MMOL/L (ref 5–15)
BH CV ECHO MEAS - AI P1/2T: 612 MSEC
BH CV ECHO MEAS - RAP SYSTOLE: 8 MMHG
BH CV ECHO MEAS - RVSP: 29 MMHG
BH CV ECHO MEAS - TR MAX PG: 20.6 MMHG
BH CV ECHO MEAS - TR MAX VEL: 227.1 CM/SEC
BUN SERPL-MCNC: 40 MG/DL (ref 8–23)
BUN/CREAT SERPL: 30.8 (ref 7–25)
CALCIUM SPEC-SCNC: 8.9 MG/DL (ref 8.6–10.5)
CHLORIDE SERPL-SCNC: 101 MMOL/L (ref 98–107)
CO2 SERPL-SCNC: 29 MMOL/L (ref 22–29)
CREAT SERPL-MCNC: 1.3 MG/DL (ref 0.76–1.27)
DEPRECATED RDW RBC AUTO: 49.5 FL (ref 37–54)
EGFRCR SERPLBLD CKD-EPI 2021: 55.5 ML/MIN/1.73
ERYTHROCYTE [DISTWIDTH] IN BLOOD BY AUTOMATED COUNT: 15.2 % (ref 12.3–15.4)
GLUCOSE SERPL-MCNC: 106 MG/DL (ref 65–99)
HCT VFR BLD AUTO: 42.2 % (ref 37.5–51)
HGB BLD-MCNC: 14 G/DL (ref 13–17.7)
MAXIMAL PREDICTED HEART RATE: 140 BPM
MCH RBC QN AUTO: 29.9 PG (ref 26.6–33)
MCHC RBC AUTO-ENTMCNC: 33.2 G/DL (ref 31.5–35.7)
MCV RBC AUTO: 90.2 FL (ref 79–97)
PLATELET # BLD AUTO: 231 10*3/MM3 (ref 140–450)
PMV BLD AUTO: 10.1 FL (ref 6–12)
POTASSIUM SERPL-SCNC: 3.7 MMOL/L (ref 3.5–5.2)
RBC # BLD AUTO: 4.68 10*6/MM3 (ref 4.14–5.8)
SODIUM SERPL-SCNC: 141 MMOL/L (ref 136–145)
STRESS TARGET HR: 119 BPM
WBC NRBC COR # BLD: 5.9 10*3/MM3 (ref 3.4–10.8)

## 2023-05-01 PROCEDURE — 82810 BLOOD GASES O2 SAT ONLY: CPT

## 2023-05-01 PROCEDURE — 25010000002 FENTANYL CITRATE (PF) 50 MCG/ML SOLUTION: Performed by: INTERNAL MEDICINE

## 2023-05-01 PROCEDURE — 93321 DOPPLER ECHO F-UP/LMTD STD: CPT | Performed by: INTERNAL MEDICINE

## 2023-05-01 PROCEDURE — 25510000001 IOPAMIDOL PER 1 ML: Performed by: INTERNAL MEDICINE

## 2023-05-01 PROCEDURE — C1894 INTRO/SHEATH, NON-LASER: HCPCS | Performed by: INTERNAL MEDICINE

## 2023-05-01 PROCEDURE — 85027 COMPLETE CBC AUTOMATED: CPT | Performed by: INTERNAL MEDICINE

## 2023-05-01 PROCEDURE — 85014 HEMATOCRIT: CPT

## 2023-05-01 PROCEDURE — 93321 DOPPLER ECHO F-UP/LMTD STD: CPT

## 2023-05-01 PROCEDURE — 93325 DOPPLER ECHO COLOR FLOW MAPG: CPT

## 2023-05-01 PROCEDURE — 85018 HEMOGLOBIN: CPT

## 2023-05-01 PROCEDURE — B2111ZZ FLUOROSCOPY OF MULTIPLE CORONARY ARTERIES USING LOW OSMOLAR CONTRAST: ICD-10-PCS | Performed by: INTERNAL MEDICINE

## 2023-05-01 PROCEDURE — 93312 ECHO TRANSESOPHAGEAL: CPT | Performed by: INTERNAL MEDICINE

## 2023-05-01 PROCEDURE — C1769 GUIDE WIRE: HCPCS | Performed by: INTERNAL MEDICINE

## 2023-05-01 PROCEDURE — 25010000002 MIDAZOLAM PER 1 MG: Performed by: INTERNAL MEDICINE

## 2023-05-01 PROCEDURE — 25010000002 HEPARIN (PORCINE) PER 1000 UNITS: Performed by: INTERNAL MEDICINE

## 2023-05-01 PROCEDURE — 99152 MOD SED SAME PHYS/QHP 5/>YRS: CPT | Performed by: INTERNAL MEDICINE

## 2023-05-01 PROCEDURE — 93460 R&L HRT ART/VENTRICLE ANGIO: CPT | Performed by: INTERNAL MEDICINE

## 2023-05-01 PROCEDURE — 93325 DOPPLER ECHO COLOR FLOW MAPG: CPT | Performed by: INTERNAL MEDICINE

## 2023-05-01 PROCEDURE — 93312 ECHO TRANSESOPHAGEAL: CPT

## 2023-05-01 PROCEDURE — 99232 SBSQ HOSP IP/OBS MODERATE 35: CPT | Performed by: INTERNAL MEDICINE

## 2023-05-01 PROCEDURE — 80048 BASIC METABOLIC PNL TOTAL CA: CPT | Performed by: INTERNAL MEDICINE

## 2023-05-01 PROCEDURE — 63710000001 PREDNISONE PER 5 MG: Performed by: INTERNAL MEDICINE

## 2023-05-01 PROCEDURE — 4A023N8 MEASUREMENT OF CARDIAC SAMPLING AND PRESSURE, BILATERAL, PERCUTANEOUS APPROACH: ICD-10-PCS | Performed by: INTERNAL MEDICINE

## 2023-05-01 RX ORDER — HEPARIN SODIUM 1000 [USP'U]/ML
INJECTION, SOLUTION INTRAVENOUS; SUBCUTANEOUS
Status: DISCONTINUED | OUTPATIENT
Start: 2023-05-01 | End: 2023-05-01 | Stop reason: HOSPADM

## 2023-05-01 RX ORDER — SODIUM CHLORIDE 9 MG/ML
75 INJECTION, SOLUTION INTRAVENOUS CONTINUOUS
Status: DISCONTINUED | OUTPATIENT
Start: 2023-05-01 | End: 2023-05-01

## 2023-05-01 RX ORDER — HYDROCODONE BITARTRATE AND ACETAMINOPHEN 5; 325 MG/1; MG/1
1 TABLET ORAL EVERY 4 HOURS PRN
Status: DISCONTINUED | OUTPATIENT
Start: 2023-05-01 | End: 2023-05-02 | Stop reason: HOSPADM

## 2023-05-01 RX ORDER — FENTANYL CITRATE 50 UG/ML
INJECTION, SOLUTION INTRAMUSCULAR; INTRAVENOUS
Status: COMPLETED | OUTPATIENT
Start: 2023-05-01 | End: 2023-05-01

## 2023-05-01 RX ORDER — SODIUM CHLORIDE 9 MG/ML
INJECTION, SOLUTION INTRAVENOUS
Status: COMPLETED | OUTPATIENT
Start: 2023-05-01 | End: 2023-05-01

## 2023-05-01 RX ORDER — MIDAZOLAM HYDROCHLORIDE 1 MG/ML
INJECTION INTRAMUSCULAR; INTRAVENOUS
Status: COMPLETED | OUTPATIENT
Start: 2023-05-01 | End: 2023-05-01

## 2023-05-01 RX ORDER — NALOXONE HCL 0.4 MG/ML
0.4 VIAL (ML) INJECTION
Status: DISCONTINUED | OUTPATIENT
Start: 2023-05-01 | End: 2023-05-02 | Stop reason: HOSPADM

## 2023-05-01 RX ORDER — MORPHINE SULFATE 2 MG/ML
1 INJECTION, SOLUTION INTRAMUSCULAR; INTRAVENOUS EVERY 4 HOURS PRN
Status: DISCONTINUED | OUTPATIENT
Start: 2023-05-01 | End: 2023-05-02 | Stop reason: HOSPADM

## 2023-05-01 RX ORDER — SODIUM CHLORIDE 9 MG/ML
100 INJECTION, SOLUTION INTRAVENOUS CONTINUOUS
Status: ACTIVE | OUTPATIENT
Start: 2023-05-01 | End: 2023-05-01

## 2023-05-01 RX ORDER — VERAPAMIL HYDROCHLORIDE 2.5 MG/ML
INJECTION, SOLUTION INTRAVENOUS
Status: DISCONTINUED | OUTPATIENT
Start: 2023-05-01 | End: 2023-05-01 | Stop reason: HOSPADM

## 2023-05-01 RX ORDER — ONDANSETRON 2 MG/ML
4 INJECTION INTRAMUSCULAR; INTRAVENOUS EVERY 6 HOURS PRN
Status: DISCONTINUED | OUTPATIENT
Start: 2023-05-01 | End: 2023-05-02 | Stop reason: HOSPADM

## 2023-05-01 RX ORDER — ONDANSETRON 4 MG/1
4 TABLET, FILM COATED ORAL EVERY 6 HOURS PRN
Status: DISCONTINUED | OUTPATIENT
Start: 2023-05-01 | End: 2023-05-02 | Stop reason: HOSPADM

## 2023-05-01 RX ORDER — ACETAMINOPHEN 325 MG/1
650 TABLET ORAL EVERY 4 HOURS PRN
Status: DISCONTINUED | OUTPATIENT
Start: 2023-05-01 | End: 2023-05-02 | Stop reason: HOSPADM

## 2023-05-01 RX ORDER — LIDOCAINE HYDROCHLORIDE 20 MG/ML
SOLUTION OROPHARYNGEAL
Status: COMPLETED | OUTPATIENT
Start: 2023-05-01 | End: 2023-05-01

## 2023-05-01 RX ORDER — LIDOCAINE HYDROCHLORIDE 20 MG/ML
INJECTION, SOLUTION INFILTRATION; PERINEURAL
Status: DISCONTINUED | OUTPATIENT
Start: 2023-05-01 | End: 2023-05-01 | Stop reason: HOSPADM

## 2023-05-01 RX ADMIN — SODIUM CHLORIDE 100 ML/HR: 9 INJECTION, SOLUTION INTRAVENOUS at 12:32

## 2023-05-01 RX ADMIN — Medication 1 MG: at 16:38

## 2023-05-01 RX ADMIN — SODIUM CHLORIDE 50 ML/HR: 9 INJECTION, SOLUTION INTRAVENOUS at 09:22

## 2023-05-01 RX ADMIN — SULFASALAZINE 1000 MG: 500 TABLET ORAL at 20:45

## 2023-05-01 RX ADMIN — Medication 10 ML: at 20:45

## 2023-05-01 RX ADMIN — MIDAZOLAM 2 MG: 1 INJECTION INTRAMUSCULAR; INTRAVENOUS at 09:48

## 2023-05-01 RX ADMIN — Medication 10 ML: at 16:30

## 2023-05-01 RX ADMIN — LIDOCAINE HYDROCHLORIDE 10 ML: 20 SOLUTION ORAL; TOPICAL at 09:17

## 2023-05-01 RX ADMIN — FENTANYL CITRATE 25 MCG: 50 INJECTION, SOLUTION INTRAMUSCULAR; INTRAVENOUS at 09:49

## 2023-05-01 RX ADMIN — PANTOPRAZOLE SODIUM 40 MG: 40 TABLET, DELAYED RELEASE ORAL at 06:22

## 2023-05-01 RX ADMIN — PREDNISONE 10 MG: 10 TABLET ORAL at 16:38

## 2023-05-01 RX ADMIN — MIDAZOLAM 1 MG: 1 INJECTION INTRAMUSCULAR; INTRAVENOUS at 09:52

## 2023-05-01 RX ADMIN — FENTANYL CITRATE 25 MCG: 50 INJECTION, SOLUTION INTRAMUSCULAR; INTRAVENOUS at 09:47

## 2023-05-01 RX ADMIN — CARVEDILOL 6.25 MG: 6.25 TABLET, FILM COATED ORAL at 18:05

## 2023-05-01 RX ADMIN — SULFASALAZINE 1000 MG: 500 TABLET ORAL at 16:38

## 2023-05-01 NOTE — PROGRESS NOTES
Name: Nito Corado ADMIT: 2023   : 1940  PCP: Jean Weaver MD    MRN: [id number] LOS: 3 days   AGE/SEX: 82 y.o. male  ROOM: [facility]     Subjective   Subjective   No issues overnight.    Objective   Objective   Vital Signs  Temp:  [97.5 °F (36.4 °C)-97.6 °F (36.4 °C)] 97.5 °F (36.4 °C)  Heart Rate:  [74-93] 84  Resp:  [12-20] 14  BP: (123-152)/(51-90) 138/86  SpO2:  [89 %-98 %] 94 %  on  Flow (L/min):  [2-3] 3;   Device (Oxygen Therapy): nasal cannula  Body mass index is 25.09 kg/m².  Physical Exam  Constitutional:       Appearance: Normal appearance. He is ill-appearing.   Cardiovascular:      Rate and Rhythm: Normal rate. Rhythm irregular.      Pulses: Normal pulses.      Heart sounds: No murmur heard.  Pulmonary:      Effort: Pulmonary effort is normal. No respiratory distress.      Breath sounds: No wheezing.   Abdominal:      General: Abdomen is flat. There is no distension.      Palpations: Abdomen is soft.   Skin:     General: Skin is warm.      Coloration: Skin is not jaundiced.   Neurological:      Mental Status: He is alert.   Psychiatric:         Mood and Affect: Mood normal.         Results Review     I reviewed the patient's new clinical results.  Results from last 7 days   Lab Units 23  03223  0429 23  1034   WBC 10*3/mm3 5.90 7.08 6.50   HEMOGLOBIN g/dL 14.0 14.4 13.1   PLATELETS 10*3/mm3 231 210 207     Results from last 7 days   Lab Units 23  0322 23  0429 23  0925 23  1034 23  2211   SODIUM mmol/L 141 142  --  139  --    POTASSIUM mmol/L 3.7 3.8 4.1 3.1*  --    CHLORIDE mmol/L 101 102  --  103  --    CO2 mmol/L 29.0 27.9  --  26.6  --    BUN mg/dL 40* 30*  --  15  --    CREATININE mg/dL 1.30* 1.22  --  0.95 1.04   GLUCOSE mg/dL 106* 117*  --  101*  --    EGFR mL/min/1.73 55.5* 59.9*  --  80.9 72.6     Results from last 7 days   Lab Units 23  1034   ALBUMIN g/dL 3.4*   BILIRUBIN mg/dL 0.5   ALK PHOS U/L 59   AST (SGOT)  U/L 9   ALT (SGPT) U/L 7     Results from last 7 days   Lab Units 05/01/23  0322 04/30/23  0429 04/28/23  1034   CALCIUM mg/dL 8.9 9.5 9.6   ALBUMIN g/dL  --   --  3.4*   MAGNESIUM mg/dL  --  2.1  --        No results found for: HGBA1C, POCGLU    No radiology results for the last day  Scheduled Medications  carvedilol, 6.25 mg, Oral, BID With Meals  folic acid, 1 mg, Oral, Daily  methotrexate, 20 mg, Oral, Weekly  pantoprazole, 40 mg, Oral, Q AM  predniSONE, 10 mg, Oral, Daily With Breakfast  sodium chloride, 10 mL, Intravenous, Q12H  sulfaSALAzine, 1,000 mg, Oral, BID    Infusions  sodium chloride, 100 mL/hr, Last Rate: 100 mL/hr (05/01/23 1232)    Diet  Diet: Cardiac Diets; Healthy Heart (2-3 Na+); Texture: Regular Texture (IDDSI 7); Fluid Consistency: Thin (IDDSI 0)       Assessment/Plan     Active Hospital Problems    Diagnosis  POA   • **CHF (congestive heart failure) [I50.9]  Yes   • Delirium due to another medical condition [F05]  Unknown   • Nonrheumatic aortic valve insufficiency [I35.1]  Yes   • Coronary artery disease involving native coronary artery of native heart without angina pectoris [I25.10]  Yes   • MAXINE on CPAP [G47.33, Z99.89]  Not Applicable   • Essential hypertension [I10]  Yes   • Atrial fibrillation [I48.91]  Yes      Resolved Hospital Problems   No resolved problems to display.       82 y.o. male admitted with CHF (congestive heart failure).      05/01/23  DENG and heart cath planned for today.  Again remains without behavior issues.    Confusion  Hallucinations  Memory issues  -No formal diagnosis though suspect he has early dementia.  Visual hallucinations would favor Lewy body dementia, though do not see any signs of parkinsonism.  Patient is unfortunately very very high risk for delirium and we need to minimize this is much as possible  -folate, B12 WNL; TSH mildly elevated though fT4 WNL  -Delirium precautions- ordered in chart and discussed with wife  -consider addition of Seroquel qhs  if behaviors continue  -as needed Zyprexa for severe agitation      Acute on chronic systolic heart failure  -Per Cardiology  -TTE (4/20/2023): 21 to 25%; indeterminate diastolic function; mild AR; trace MR     -Significantly decreased from reported EF at outside facility  -Coreg 6.25 mg twice daily, Lasix 40 mg twice daily  - losartan 12.5 mg, spironolactone 12.5 mg ON HOLD for cath  -DENG and cath planned today     Elevated Crt  -Crt 1.04 OA > 1.2 > 1.3  -agree with IVF as risk for JUNITO MAYA with cath    Permanent A-fib  -RC: Metoprolol 50 mg twice daily  -AC: Apixaban, on hold for cath     Rheumatoid arthritis  -Sulfasalazine 1000 mg twice daily, cetraxate 20 mg weekly, prednisone 10 mg      · Eliquis  for DVT prophylaxis.  · Full code.  · Discussed with patient and nursing staff.      Andres Cano MD  [facility]  05/01/23  13:08 EDT

## 2023-05-01 NOTE — PROGRESS NOTES
[facility] Follow Up    Chief Complaint: Follow up CHF    Interval History: I examined the patient in the cath lab.  He is status post DENG which showed only mild aortic valve regurgitation.    Objective:     Objective:  Temp:  [97.5 °F (36.4 °C)-97.6 °F (36.4 °C)] 97.5 °F (36.4 °C)  Heart Rate:  [74-86] 74  Resp:  [18] 18  BP: (123-140)/(67-90) 137/90     Intake/Output Summary (Last 24 hours) at 5/1/2023 0732  Last data filed at 4/30/2023 2312  Gross per 24 hour   Intake 360 ml   Output 400 ml   Net -40 ml     Body mass index is 25.42 kg/m².      04/30/23  0556 04/30/23  0707 05/01/23  0500   Weight: 82.2 kg (181 lb 3.5 oz) 81 kg (178 lb 8 oz) 84.2 kg (185 lb 10 oz)     Weight change: -1.233 kg (-2 lb 11.5 oz)      Physical Exam:   General : Alert, cooperative, in no acute distress.  Neuro: Alert,cooperative and oriented.  Lungs: CTAB. Normal respiratory effort and rate.  CV: Regular rate and rhythm, normal S1 and S2, no murmurs, gallops or rubs.  ABD: Soft, nontender, nondistended. Positive bowel sounds.  Extr: No edema or cyanosis, moves all extremities.    Lab Review:   Results from last 7 days   Lab Units 05/01/23  0322 04/30/23  0429 04/29/23  0925 04/28/23  1034   SODIUM mmol/L 141 142  --  139   POTASSIUM mmol/L 3.7 3.8   < > 3.1*   CHLORIDE mmol/L 101 102  --  103   CO2 mmol/L 29.0 27.9  --  26.6   BUN mg/dL 40* 30*  --  15   CREATININE mg/dL 1.30* 1.22  --  0.95   GLUCOSE mg/dL 106* 117*  --  101*   CALCIUM mg/dL 8.9 9.5  --  9.6   AST (SGOT) U/L  --   --   --  9   ALT (SGPT) U/L  --   --   --  7    < > = values in this interval not displayed.         Results from last 7 days   Lab Units 05/01/23  0322 04/30/23  0429   WBC 10*3/mm3 5.90 7.08   HEMOGLOBIN g/dL 14.0 14.4   HEMATOCRIT % 42.2 43.3   PLATELETS 10*3/mm3 231 210         Results from last 7 days   Lab Units 04/30/23  0429   MAGNESIUM mg/dL 2.1     Results from last 7 days   Lab Units 04/28/23  1034   CHOLESTEROL mg/dL 159    TRIGLYCERIDES mg/dL 111   HDL CHOL mg/dL 33*     Results from last 7 days   Lab Units 04/28/23  1034   PROBNP pg/mL 18,620.0*     Results from last 7 days   Lab Units 04/28/23  1034   TSH uIU/mL 4.900*     I reviewed the patient's new clinical results.  I personally viewed and interpreted the patient's EKG  Current Medications:   Scheduled Meds:carvedilol, 6.25 mg, Oral, BID With Meals  folic acid, 1 mg, Oral, Daily  methotrexate, 20 mg, Oral, Weekly  pantoprazole, 40 mg, Oral, Q AM  predniSONE, 10 mg, Oral, Daily With Breakfast  sodium chloride, 10 mL, Intravenous, Q12H  sulfaSALAzine, 1,000 mg, Oral, BID      Continuous Infusions:sodium chloride, 75 mL/hr        Allergies:  Allergies   Allergen Reactions   • [place] Cough       Assessment/Plan:     1.  Acute systolic congestive heart failure.  Reportedly with mild depression of his LV systolic function on his most recent echocardiogram.  I do not have the report to review.  He was diuresed with IV furosemide at [facility].  2.  Aortic valve regurgitation.  Last echocardiogram in 2021 showed mild aortic valve regurgitation.  Reportedly his valvular regurgitation now severe at [facility].  Appears mild on echo here.   3.  Cardiomyopathy.  Ef of 20-25%.  New decline.    4.  Coronary artery disease.  Prior nonobstructive disease noted in 2020.  His EKG does show some lateral T wave changes but his troponins at the outside hospital were normal and he is not having any anginal symptoms at this time.  5.  Hypertension.  Controlled with diuresis, switch to carvedilol and addition of losartan and spironolactone.  6.  Permanent atrial fibrillation.  Rate controlled.  On apixaban for anticoagulation which is now on hold.   7.  Rheumatoid arthritis.  On prednisone 10 mg daily, methotrexate once weekly, and sulfasalazine.  8.  Confusion/altered mental status. Felt to be delirium.   9.  MAYA.  Diuretics already on hold.     - Hold losartan and spironolactone  anticipation of cardiac catheterization.  - Start IV fluids due to creatinine rise and cardiac catheterization today.  - For right and left cardiac catheterization today.    Nita Kumar MD  05/01/23  07:32 EDT

## 2023-05-01 NOTE — CASE MANAGEMENT/SOCIAL WORK
Continued Stay Note  [facility]     Patient Name: Nito Corado  MRN: [id number]  Today's Date: 5/1/2023    Admit Date: 4/27/2023    Plan: Home with family, family to transport home   Discharge Plan     Row Name 05/01/23 1604       Plan    Plan Home with family, family to transport home    Plan Comments met with pt at bedside. Alert, oriented, appropriate. Pt had cath this date. Family anticipates home, will transport home. No needs               Discharge Codes    No documentation.               Expected Discharge Date and Time     Expected Discharge Date Expected Discharge Time    May 1, 2023             Maria Guadalupe Moralez RN

## 2023-05-01 NOTE — PLAN OF CARE
Goal Outcome Evaluation:  Plan of Care Reviewed With: patient           Outcome Evaluation: VSS--increased O2 to 3LNC while sleeping, no c/o pain. Pt appeared to sleep some overnight. Wife at bedside--attentive to pt. Consent forms signed for procedures today and pt NPO at MN. Pt starting to feel constipated, but does not want to add any SS until after all procedures today. Will CTM, safety maintained.  Diuretic in Use: IV lasix on hold for procedures  Response to Diuretics (Output greater than intake): N/A  Daily Weight (up or down): up  O2 Requirements: 2-3LNC  Functional Status (Activity level, tolerance and respiratory symptoms): SOA with exertion  Discharge Plans: TBD

## 2023-05-01 NOTE — SIGNIFICANT NOTE
05/01/23 1442   OTHER   Discipline occupational therapist   Rehab Time/Intention   Session Not Performed patient unavailable for evaluation  (pt off floor for procedure, will follow up with OT sanjeev on 5/2)   Recommendation   OT - Next Appointment 05/02/23

## 2023-05-01 NOTE — SIGNIFICANT NOTE
05/01/23 1025   OTHER   Discipline physical therapist   Rehab Time/Intention   Session Not Performed patient unavailable for treatment  (pt LEVI for procedure, will continue to follow per POC)   Therapy Assessment/Plan (PT)   Criteria for Skilled Interventions Met (PT) yes   Recommendation   PT - Next Appointment 05/02/23

## 2023-05-01 NOTE — PLAN OF CARE
Goal Outcome Evaluation:    VSS  Pt went to DENG before 1st assessment  -pt back from the cath lab @ 1145  TR Band removal started @ 1320 but had to be delayed when pt started to bleed  Procedure completed @ 1510-dressing applied-no further bleeding or drg or swelling at sie  Able to eat dinner  Has slept at intervals today  Denies pain  Wife given update   Pt hoping to be discharged tomorrow  Pt instructed on post TR Band removal and importance of calling for bleeding and numbness-pt verbalizes understanding

## 2023-05-02 ENCOUNTER — READMISSION MANAGEMENT (OUTPATIENT)
Dept: CALL CENTER | Facility: HOSPITAL | Age: 83
End: 2023-05-02
Payer: MEDICARE

## 2023-05-02 VITALS
WEIGHT: 190.48 LBS | HEART RATE: 70 BPM | TEMPERATURE: 97.7 F | HEIGHT: 72 IN | OXYGEN SATURATION: 94 % | BODY MASS INDEX: 25.8 KG/M2 | DIASTOLIC BLOOD PRESSURE: 96 MMHG | SYSTOLIC BLOOD PRESSURE: 137 MMHG | RESPIRATION RATE: 18 BRPM

## 2023-05-02 PROBLEM — I50.21 ACUTE HFREF (HEART FAILURE WITH REDUCED EJECTION FRACTION): Status: ACTIVE | Noted: 2023-05-02

## 2023-05-02 LAB
ANION GAP SERPL CALCULATED.3IONS-SCNC: 10 MMOL/L (ref 5–15)
BUN SERPL-MCNC: 35 MG/DL (ref 8–23)
BUN/CREAT SERPL: 29.7 (ref 7–25)
CALCIUM SPEC-SCNC: 8.7 MG/DL (ref 8.6–10.5)
CHLORIDE SERPL-SCNC: 107 MMOL/L (ref 98–107)
CO2 SERPL-SCNC: 25 MMOL/L (ref 22–29)
CREAT SERPL-MCNC: 1.18 MG/DL (ref 0.76–1.27)
EGFRCR SERPLBLD CKD-EPI 2021: 62.4 ML/MIN/1.73
GLUCOSE SERPL-MCNC: 98 MG/DL (ref 65–99)
HCT VFR BLDA CALC: 42 % (ref 38–51)
HCT VFR BLDA CALC: 42 % (ref 38–51)
HGB BLDA-MCNC: 14.3 G/DL (ref 12–17)
HGB BLDA-MCNC: 14.3 G/DL (ref 12–17)
POTASSIUM SERPL-SCNC: 3.8 MMOL/L (ref 3.5–5.2)
SAO2 % BLDA: 60 % (ref 95–98)
SAO2 % BLDA: 96 % (ref 95–98)
SODIUM SERPL-SCNC: 142 MMOL/L (ref 136–145)

## 2023-05-02 PROCEDURE — 80048 BASIC METABOLIC PNL TOTAL CA: CPT | Performed by: INTERNAL MEDICINE

## 2023-05-02 PROCEDURE — 99239 HOSP IP/OBS DSCHRG MGMT >30: CPT | Performed by: INTERNAL MEDICINE

## 2023-05-02 PROCEDURE — 97165 OT EVAL LOW COMPLEX 30 MIN: CPT | Performed by: OCCUPATIONAL THERAPIST

## 2023-05-02 PROCEDURE — 63710000001 PREDNISONE PER 5 MG: Performed by: INTERNAL MEDICINE

## 2023-05-02 PROCEDURE — 97116 GAIT TRAINING THERAPY: CPT

## 2023-05-02 RX ORDER — ATORVASTATIN CALCIUM 20 MG/1
20 TABLET, FILM COATED ORAL NIGHTLY
Qty: 90 TABLET | Refills: 1 | Status: SHIPPED | OUTPATIENT
Start: 2023-05-02

## 2023-05-02 RX ORDER — FUROSEMIDE 20 MG/1
20 TABLET ORAL DAILY
Qty: 30 TABLET | Refills: 5 | Status: SHIPPED | OUTPATIENT
Start: 2023-05-02

## 2023-05-02 RX ORDER — CARVEDILOL 6.25 MG/1
6.25 TABLET ORAL 2 TIMES DAILY WITH MEALS
Qty: 180 TABLET | Refills: 1 | Status: SHIPPED | OUTPATIENT
Start: 2023-05-02

## 2023-05-02 RX ORDER — ATORVASTATIN CALCIUM 20 MG/1
20 TABLET, FILM COATED ORAL NIGHTLY
Status: DISCONTINUED | OUTPATIENT
Start: 2023-05-02 | End: 2023-05-02 | Stop reason: HOSPADM

## 2023-05-02 RX ORDER — LOSARTAN POTASSIUM 25 MG/1
12.5 TABLET ORAL
Qty: 45 TABLET | Refills: 1 | Status: SHIPPED | OUTPATIENT
Start: 2023-05-03 | End: 2023-05-15

## 2023-05-02 RX ORDER — LOSARTAN POTASSIUM 25 MG/1
12.5 TABLET ORAL
Status: DISCONTINUED | OUTPATIENT
Start: 2023-05-02 | End: 2023-05-02 | Stop reason: HOSPADM

## 2023-05-02 RX ORDER — SPIRONOLACTONE 25 MG/1
12.5 TABLET ORAL DAILY
Qty: 45 TABLET | Refills: 1 | Status: SHIPPED | OUTPATIENT
Start: 2023-05-03

## 2023-05-02 RX ADMIN — Medication 12.5 MG: at 08:33

## 2023-05-02 RX ADMIN — CARVEDILOL 6.25 MG: 6.25 TABLET, FILM COATED ORAL at 08:33

## 2023-05-02 RX ADMIN — LOSARTAN POTASSIUM 12.5 MG: 25 TABLET, FILM COATED ORAL at 08:33

## 2023-05-02 RX ADMIN — Medication 1 MG: at 08:33

## 2023-05-02 RX ADMIN — PREDNISONE 10 MG: 10 TABLET ORAL at 08:33

## 2023-05-02 RX ADMIN — PANTOPRAZOLE SODIUM 40 MG: 40 TABLET, DELAYED RELEASE ORAL at 06:31

## 2023-05-02 RX ADMIN — SULFASALAZINE 1000 MG: 500 TABLET ORAL at 08:33

## 2023-05-02 RX ADMIN — Medication 10 ML: at 08:34

## 2023-05-02 NOTE — PLAN OF CARE
Goal Outcome Evaluation:  Plan of Care Reviewed With: patient        Progress: improving  Outcome Evaluation: Pt tolerated treatment well this date. Increased gait distance to 150ft w/ Rw and CGA-SBA. No LOBs noted throughout. Limited d/t fatigue. Encouraged pt to ambulate w/ nsg during the day.

## 2023-05-02 NOTE — PLAN OF CARE
Goal Outcome Evaluation:  Plan of Care Reviewed With: patient           Outcome Evaluation: VSS, no c/o pain. Pt up with 1 x assist. Pt appeared to sleep well between care. Cath sites c/d/i. Pt hopeful for D/C today. Will CTM, safety maintained.  Diuretic in Use: on hold  Response to Diuretics (Output greater than intake): JAYLEN  Daily Weight (up or down): up  O2 Requirements: 2-3LNC  Functional Status (Activity level, tolerance and respiratory symptoms): SOA with exertion but improved per pt  Discharge Plans: Home with family soon

## 2023-05-02 NOTE — PLAN OF CARE
Goal Outcome Evaluation:  Plan of Care Reviewed With: patient        Progress: improving  Outcome Evaluation: pt completed OT evaluation this date but was tired and not up for a lot. pt already walked and did not want to get back up. pt demo good ROM in UE and decent strength. Reports he has AE at home for BR w bench in tub and grab bars. has family at home to A. pt completed UE ex 10x1-2 to incr strength and endurance. pt did not want to complete any ADLs this date already completed. pt reports he may be going home today.

## 2023-05-02 NOTE — PROGRESS NOTES
[facility] Follow Up    Chief Complaint: Follow up CHF    Interval History: No chest pain.  Breathing better.    Objective:     Objective:  Temp:  [97.3 °F (36.3 °C)-97.6 °F (36.4 °C)] 97.5 °F (36.4 °C)  Heart Rate:  [73-93] 77  Resp:  [12-20] 18  BP: (123-158)/(51-98) 158/98     Intake/Output Summary (Last 24 hours) at 5/2/2023 0718  Last data filed at 5/2/2023 0638  Gross per 24 hour   Intake 630 ml   Output --   Net 630 ml     Body mass index is 25.83 kg/m².      05/01/23  0500 05/01/23  1022 05/02/23  0528   Weight: 84.2 kg (185 lb 10 oz) 83.9 kg (185 lb) 86.4 kg (190 lb 7.6 oz)     Weight change: 2.948 kg (6 lb 8 oz)      Physical Exam:   General : Alert, cooperative, in no acute distress.  Neuro: Alert,cooperative and oriented.  Lungs: CTAB. Normal respiratory effort and rate.  CV: Irregularly, irregular, normal S1 and S2, no murmurs, gallops or rubs.  ABD: Soft, nontender, nondistended. Positive bowel sounds.  Extr: No edema or cyanosis, moves all extremities.    Lab Review:   Results from last 7 days   Lab Units 05/02/23  0409 05/01/23 0322 04/29/23  0925 04/28/23  1034   SODIUM mmol/L 142 141   < > 139   POTASSIUM mmol/L 3.8 3.7   < > 3.1*   CHLORIDE mmol/L 107 101   < > 103   CO2 mmol/L 25.0 29.0   < > 26.6   BUN mg/dL 35* 40*   < > 15   CREATININE mg/dL 1.18 1.30*   < > 0.95   GLUCOSE mg/dL 98 106*   < > 101*   CALCIUM mg/dL 8.7 8.9   < > 9.6   AST (SGOT) U/L  --   --   --  9   ALT (SGPT) U/L  --   --   --  7    < > = values in this interval not displayed.         Results from last 7 days   Lab Units 05/01/23  0322 04/30/23  0429   WBC 10*3/mm3 5.90 7.08   HEMOGLOBIN g/dL 14.0 14.4   HEMATOCRIT % 42.2 43.3   PLATELETS 10*3/mm3 231 210         Results from last 7 days   Lab Units 04/30/23  0429   MAGNESIUM mg/dL 2.1     Results from last 7 days   Lab Units 04/28/23  1034   CHOLESTEROL mg/dL 159   TRIGLYCERIDES mg/dL 111   HDL CHOL mg/dL 33*     Results from last 7 days   Lab Units  04/28/23  1034   PROBNP pg/mL 18,620.0*     Results from last 7 days   Lab Units 04/28/23  1034   TSH uIU/mL 4.900*     I reviewed the patient's new clinical results.  I personally viewed and interpreted the patient's EKG  Current Medications:   Scheduled Meds:atorvastatin, 20 mg, Oral, Nightly  carvedilol, 6.25 mg, Oral, BID With Meals  folic acid, 1 mg, Oral, Daily  losartan, 12.5 mg, Oral, Q24H  methotrexate, 20 mg, Oral, Weekly  pantoprazole, 40 mg, Oral, Q AM  predniSONE, 10 mg, Oral, Daily With Breakfast  sodium chloride, 10 mL, Intravenous, Q12H  spironolactone, 12.5 mg, Oral, Daily  sulfaSALAzine, 1,000 mg, Oral, BID      Continuous Infusions:     Allergies:  Allergies   Allergen Reactions   • [place] Cough       Assessment/Plan:     1.  Acute systolic congestive heart failure.  Diuresed.  Low to normal filling pressures by cath on 5/1.  2.  Aortic valve regurgitation.  Moderate by DENG.    3.  Cardiomyopathy.  EF reportedly 20-25% by TTE but appears better around 40-45% by DENG.  New decline regardless.   4.  Coronary artery disease.  Stable non-obstructive disease by cath on 5/1.  5.  Hypertension.  Increased this morning after holding losartan and spironolactone.  6.  Permanent atrial fibrillation.  Rate controlled.  On apixaban for anticoagulation which is now on hold.   7.  Rheumatoid arthritis.  On prednisone 10 mg daily, methotrexate once weekly, and sulfasalazine.  8.  Confusion/altered mental status. Felt to be delirium.   9.  MAYA.  Improved.      -Resume low-dose losartan and spironolactone this morning.  - Wean off oxygen.  He would likely need to be set up with an overnight oximetry testing at home.  - If he does well this morning possible discharge home later today.    Nita Kumar MD  05/02/23  07:18 EDT

## 2023-05-02 NOTE — DISCHARGE SUMMARY
[facility] Discharge    Patient Name: Nito Corado  Age/Sex: 82 y.o. male  : 1940  MRN: [id number]    Encounter Provider: Nita Kumar MD  Referring Provider: Jeff Morales MD  Place of Service: [facility]  Patient Care Team:  Jean Weaver MD as PCP - General (Family Medicine)         Date of Discharge:  2023     Date of Admit: 2023    Discharge Condition: Stable    Discharge Diagnosis:  1.  Chronic systolic congestive heart failure  2.  Cardiomyopathy, EF of 40%  3.  Stable nonobstructive coronary artery disease  4.  Hypertension  5.  Obstructive sleep apnea, currently untreated  6.  Moderate aortic valve regurgitation  7.  Suspected early dementia  8.  Rheumatoid arthritis  9.  Permanent atrial fibrillation    Hospital Course:   Nito Corado is a 82 y.o. male with aortic valve regurgitation, nonobstructive coronary artery disease, permanent atrial fibrillation, hypertension, rheumatoid arthritis, hyperlipidemia, chronic diastolic congestive heart failure, who was admitted from [facility] on 2023 with volume overload.    The patient reported progressively worsening shortness of breath and generalized weakness over the last few months and especially over the last 2 to 3 weeks.  Patient's wife also indicated that he was having more issues with memory and increased confusion.  He ended up presenting to the [facility] emergency room where his work-up showed 2 normal troponins and unremarkable EKG.  However he did have evidence of volume overload by elevated BNP, chest x-ray and by exam.  He was admitted and diuresed there.  An echocardiogram was repeated showing his LV function had depressed to 45 to 50% and there was evidence of severe aortic valve regurgitation.  We were contacted at that point regarding transfer to [facility].    Following his transfer he did appear to  be volume overloaded.  He was continued on IV diuresis.  We were unable to open the echocardiogram from [facility] so a repeat limited echocardiogram was performed here on 4/28/2023.  This indicated severely depressed left ventricular systolic function with an EF of 20 to 25% and only mild aortic valve regurgitation.  Based on the decline in his LV function and the discrepancy in the aortic valve regurgitation a right and left cardiac catheterization and transesophageal echocardiogram were recommended.    On his initial evening of his admission the patient was belligerent and confused.  Medicine was consulted to assist with management.  There is concern that his behavior may represent early dementia.  It is recommended that he have a TSH checked as an outpatient to monitor for potential subclinical hypothyroidism.  Also recommended follow-up with primary care and consider neurology referral if his symptoms became more progressive.    Transesophageal echocardiogram was performed on 4/29/2023.  This showed mildly depressed left ventricular systolic function with an EF of 40 to 45%, severely dilated left atrium, and moderate aortic valve regurgitation.  His right and left cardiac catheterization showed normal left ventricular filling pressures and stable moderate nonobstructive coronary artery disease.    Of note prior to his cardiac catheterization he was noted to have an elevated creatinine of up to 1.3.  In anticipation of his cardiac catheterization I held the low-dose losartan and spironolactone that were started this admission.  I also kept him off of diuretics before and after his cardiac catheterization based on the findings of his low filling pressures.    On the day of discharge the patient was feeling well.  His creatinine had improved.  He was resumed on low-dose losartan and spironolactone.  He was also started on furosemide 20 mg daily.  In addition he was continued on carvedilol.  He was  resumed on apixaban for anticoagulation.  He was also restarted on atorvastatin which he had previously stopped due to issues with myalgias that was ultimately due to a diagnosis of rheumatoid arthritis.    Of note during his admission he was noted to have issues with nocturnal hypoxia.  On further discussion with the patient's wife she indicated that he does have a diagnosis of obstructive sleep apnea and was compliant with the CPAP until August.  Around that time he was diagnosed with COVID and did not use his CPAP during that period of time.  When he tried to use his CPAP again after recovering from COVID he reportedly felt like it was not working as well and has not used it over the last several months.  I recommended that he undergo reevaluation.  I have sent in a referral for sleep medicine as an outpatient.  I explained to the patient's wife that treatment of his sleep apnea is necessary to help his cardiomyopathy.    We will arrange for 1 week follow-up with MYESHA Davila at [facility] and with Dr. Ballesteros in 6 weeks.    Objective:  Temp:  [97.3 °F (36.3 °C)-97.7 °F (36.5 °C)] 97.7 °F (36.5 °C)  Heart Rate:  [70-85] 70  Resp:  [16-18] 18  BP: (134-158)/(78-98) 137/96    Intake/Output Summary (Last 24 hours) at 5/2/2023 1333  Last data filed at 5/2/2023 0638  Gross per 24 hour   Intake 580 ml   Output --   Net 580 ml     Body mass index is 25.83 kg/m².      05/01/23  0500 05/01/23  1022 05/02/23  0528   Weight: 84.2 kg (185 lb 10 oz) 83.9 kg (185 lb) 86.4 kg (190 lb 7.6 oz)     Weight change: 2.948 kg (6 lb 8 oz)      Procedures Performed  Procedure(s):  Right and Left Heart Cath  Coronary angiography       Consults:  Consults     Date and Time Order Name Status Description    4/28/2023  3:27 AM Inpatient Hospitalist Consult Completed           Pertinent Test Results:  Results from last 7 days   Lab Units 05/02/23  0409 05/01/23  0322 04/30/23  0429 04/29/23  0925 04/28/23  1034 04/27/23  6601    SODIUM mmol/L 142 141 142  --  139  --    POTASSIUM mmol/L 3.8 3.7 3.8 4.1 3.1*  --    CHLORIDE mmol/L 107 101 102  --  103  --    CO2 mmol/L 25.0 29.0 27.9  --  26.6  --    BUN mg/dL 35* 40* 30*  --  15  --    CREATININE mg/dL 1.18 1.30* 1.22  --  0.95 1.04   GLUCOSE mg/dL 98 106* 117*  --  101*  --    CALCIUM mg/dL 8.7 8.9 9.5  --  9.6  --    AST (SGOT) U/L  --   --   --   --  9  --    ALT (SGPT) U/L  --   --   --   --  7  --          Results from last 7 days   Lab Units 05/01/23  1122 05/01/23  1118 05/01/23  0322 04/30/23  0429 04/28/23  1034   WBC 10*3/mm3  --   --  5.90 7.08 6.50   HEMOGLOBIN g/dL  --   --  14.0 14.4 13.1   HEMOGLOBIN, POC g/dL 14.3 14.3  --   --   --    HEMATOCRIT %  --   --  42.2 43.3 39.8   HEMATOCRIT POC % 42 42  --   --   --    PLATELETS 10*3/mm3  --   --  231 210 207         Results from last 7 days   Lab Units 04/30/23  0429   MAGNESIUM mg/dL 2.1     Results from last 7 days   Lab Units 04/28/23  1034   CHOLESTEROL mg/dL 159   TRIGLYCERIDES mg/dL 111   HDL CHOL mg/dL 33*     Results from last 7 days   Lab Units 04/28/23  1034   PROBNP pg/mL 18,620.0*     Results from last 7 days   Lab Units 04/28/23  1034   TSH uIU/mL 4.900*       Discharge Medications     Discharge Medications      New Medications      Instructions Start Date   atorvastatin 20 MG tablet  Commonly known as: LIPITOR   20 mg, Oral, Nightly      carvedilol 6.25 MG tablet  Commonly known as: COREG   6.25 mg, Oral, 2 Times Daily With Meals      furosemide 20 MG tablet  Commonly known as: LASIX   20 mg, Oral, Daily      losartan 25 MG tablet  Commonly known as: COZAAR   12.5 mg, Oral, Every 24 Hours Scheduled   Start Date: May 3, 2023     spironolactone 25 MG tablet  Commonly known as: ALDACTONE   12.5 mg, Oral, Daily   Start Date: May 3, 2023        Continue These Medications      Instructions Start Date   Eliquis 5 MG tablet tablet  Generic drug: apixaban   Take 1 tablet by mouth twice daily      folic acid 1 MG  tablet  Commonly known as: FOLVITE   1 mg, Oral, Daily      methotrexate 2.5 MG tablet   20 mg, Oral, Weekly, SATURDAY      omeprazole 20 MG capsule  Commonly known as: priLOSEC   No dose, route, or frequency recorded.      sulfaSALAzine 500 MG tablet  Commonly known as: AZULFIDINE   1,000 mg, Oral, 2 Times Daily         Stop These Medications    metoprolol tartrate 50 MG tablet  Commonly known as: LOPRESSOR            Discharge Diet:    Dietary Orders (From admission, onward)     Start     Ordered    05/01/23 1259  Diet: Cardiac Diets; Healthy Heart (2-3 Na+); Texture: Regular Texture (IDDSI 7); Fluid Consistency: Thin (IDDSI 0)  Diet Effective Now        References:    Diet Order [place]   Question Answer Comment   Diets: Cardiac Diets    Cardiac Diet: Healthy Heart (2-3 Na+)    Texture: Regular Texture (IDDSI 7)    Fluid Consistency: Thin (IDDSI 0)        05/01/23 1258                Activity at Discharge:   As instructed    Discharge disposition: home     Discharge Instructions and Follow ups:  Future Appointments   Date Time Provider Department Center   9/8/2023 10:40 AM Zunilda Ballesteros MD [facility]     Additional Instructions for the Follow-ups that You Need to Schedule     Ambulatory Referral to Sleep Medicine   As directed      Follow-up needed: Yes         Discharge Follow-up with Specified Provider: 1 week with MYESHA Davila   As directed      To: 1 week with MYESHA Davila    Follow Up Details: office will call with appointment         Discharge Follow-up with Specified Provider: 4-6 weeks with Dr. Ballesteros   As directed      To: 4-6 weeks with Dr. Ballesteros    Follow Up Details: office will call with appointment            Follow-up Information     Jean Weaver MD .    Specialty: Family Medicine  Contact information:  [address] [place] [address] [address] [place] [address]  [phone]                          Nita Kumar MD  05/02/23  13:33 EDT    Time: Discharge 45 min    EMR  Dragon/Transcription disclaimer:   Part of this note may be an electronic transcription/translation of spoken language to printed text using the Dragon dictation system.

## 2023-05-02 NOTE — PLAN OF CARE
Goal Outcome Evaluation:  Pt being discharged home after instructions to patient -spouse is at side  Right wrist cath site dry and intact-no drg,edema or s/s infection  Instructions of importance of checking for s/s of infection to pt  Rest of assessment per flowsheet

## 2023-05-02 NOTE — THERAPY TREATMENT NOTE
Patient Name: Nito Corado  : 1940    MRN: [id number]                              Today's Date: 2023       Admit Date: 2023    Visit Dx:     ICD-10-CM ICD-9-CM   1. Acute combined systolic and diastolic congestive heart failure  I50.41 428.41     428.0     Patient Active Problem List   Diagnosis   • Atrial fibrillation   • Overweight on examination   • Dyspnea   • Essential hypertension   • Other fatigue   • MAXINE on CPAP   • Coronary artery disease involving native coronary artery of native heart without angina pectoris   • Nonrheumatic aortic valve insufficiency   • CHF (congestive heart failure)   • Delirium due to another medical condition     Past Medical History:   Diagnosis Date   • Allergic rhinitis    • Atrial fibrillation    • Benign hypertension    • History of bleeding ulcers    • Marginal perforation of tympanic membrane    • Otorrhea    • PAF (paroxysmal atrial fibrillation)      Past Surgical History:   Procedure Laterality Date   • CARDIAC CATHETERIZATION N/A 2020    Procedure: Coronary angiography;  Surgeon: Nita Kumar MD;  Location: [facility] CATH INVASIVE LOCATION;  Service: Cardiovascular;  Laterality: N/A;   • CARDIAC CATHETERIZATION N/A 2020    Procedure: Left heart cath;  Surgeon: Nita Kumar MD;  Location:  [facility] CATH INVASIVE LOCATION;  Service: Cardiovascular;  Laterality: N/A;   • CARDIAC CATHETERIZATION N/A 2020    Procedure: Left ventriculography;  Surgeon: Nita Kumar MD;  Location: [facility] CATH INVASIVE LOCATION;  Service: Cardiovascular;  Laterality: N/A;   • CARDIAC CATHETERIZATION  2020    Procedure: Functional Flow [place];  Surgeon: Nita Kumar MD;  Location: [facility] CATH [facility] LOC[facility];  Service: Cardiovascular;;   • CARDIAC CATHETERIZATION N/A 2023    Procedure: Right and Left Heart Cath;  Surgeon: Nita Kumar MD;  Location[facility] C[facility]AS[facility] LOC[facility];  Service: Cardiovascular;  Laterality: N/A;   • CARDIAC  CATHETERIZATION N/A 5/1/2023    Procedure: Coronary angiography;  Surgeon: Nita Kumar MD;  Location: [facility] CATH INVASIVE LOCATION;  Service: Cardiovascular;  Laterality: N/A;   • EXTERNAL EAR SURGERY Left    • KNEE SURGERY        General Information     Row Name 05/02/23 0924          Physical Therapy Time and Intention    Document Type therapy note (daily note)  -     Mode of Treatment physical therapy  -     Row Name 05/02/23 0924          General Information    Existing Precautions/Restrictions fall;oxygen therapy device and L/min  -     Row Name 05/02/23 0924          Cognition    Orientation Status (Cognition) oriented x 4  -           User Key  (r) = Recorded By, (t) = Taken By, (c) = Cosigned By    Initials Name Provider Type    Jennifer Dennison PTA Physical Therapist Assistant               Mobility     Row Name 05/02/23 0924          Bed Mobility    Bed Mobility supine-sit  -     Supine-Sit [place] (Bed Mobility) supervision  -     Assistive Device (Bed Mobility) bed rails;head of bed elevated  -     Row Name 05/02/23 0924          Sit-Stand Transfer    Sit-Stand [place] (Transfers) contact guard  -     Row Name 05/02/23 0924          Gait/Stairs (Locomotion)    [place] Level (Gait) contact guard;standby assist  -     Assistive Device (Gait) walker, front-wheeled  -     Distance in Feet (Gait) 150  -     Deviations/Abnormal Patterns (Gait) david decreased;stride length decreased  -     Bilateral Gait Deviations forward flexed posture  -           User Key  (r) = Recorded By, (t) = Taken By, (c) = Cosigned By    Initials Name Provider Type    Jennifer Dennison PTA Physical Therapist Assistant               Obj/Interventions     Row Name 05/02/23 0925          Motor Skills    Therapeutic Exercise --  seated AP and LAQ x10 reps  -           User Key  (r) = Recorded By, (t) = Taken By, (c) = Cosigned By    Initials Name Provider Type    RONI Frias  Jennifer Phillips PTA Physical Therapist Assistant               Goals/Plan    No documentation.                Clinical Impression     Row Name 05/02/23 0925          Pain    Pretreatment Pain Rating 0/10 - no pain  -SM     Posttreatment Pain Rating 0/10 - no pain  -SM     Row Name 05/02/23 0925          Positioning and Restraints    Pre-Treatment Position in bed  -SM     Post Treatment Position chair  -SM     In Chair reclined;call light within reach;encouraged to call for assist;exit alarm on  -SM           User Key  (r) = Recorded By, (t) = Taken By, (c) = Cosigned By    Initials Name Provider Type    Jennifer Dennison PTA Physical Therapist Assistant               Outcome Measures     Row Name 05/02/23 0926 05/02/23 0800       How much help from another person do you currently need...    Turning from your back to your side while in flat bed without using bedrails? 4  -SM 3  -CM    Moving from lying on back to sitting on the side of a flat bed without bedrails? 3  -SM 3  -CM    Moving to and from a bed to a chair (including a wheelchair)? 3  -SM 3  -CM    Standing up from a chair using your arms (e.g., wheelchair, bedside chair)? 4  -SM 3  -CM    Climbing 3-5 steps with a railing? 3  -SM 2  -CM    To walk in hospital room? 3  -SM 2  -CM    AM-PAC 6 Clicks Score (PT) 20  -SM 16  -CM    Highest level of mobility 6 --> Walked 10 steps or more  -SM 5 --> Static standing  -CM    Row Name 05/02/23 0926          Functional Assessment    Outcome Measure Options AM-PAC 6 Clicks Basic Mobility (PT)  -SM           User Key  (r) = Recorded By, (t) = Taken By, (c) = Cosigned By    Initials Name Provider Type    Skylar Somers RN Registered Nurse    Jennifer Dennison PTA Physical Therapist Assistant                             Physical Therapy Education     Title: PT OT SLP Therapies (Done)     Topic: Physical Therapy (Done)     Point: Mobility training (Done)     Learning Progress Summary           Patient  Acceptance, E,TB,D, VU,NR by  at 5/2/2023 0926    Eager, E, NR by  at 4/29/2023 1610                   Point: Home exercise program (Done)     Learning Progress Summary           Patient Acceptance, E,TB,D, VU,NR by  at 5/2/2023 0926    Eager, E, NR by  at 4/29/2023 1610                               User Key     Initials Effective Dates Name Provider Type Discipline     06/16/21 -  Alysia Umaña, PT Physical Therapist PT     03/07/18 -  Jennifer Frias PTA Physical Therapist Assistant PT              PT Recommendation and Plan     Plan of Care Reviewed With: patient  Progress: improving  Outcome Evaluation: Pt tolerated treatment well this date. Increased gait distance to 150ft w/ Rw and CGA-SBA. No LOBs noted throughout. Limited d/t fatigue. Encouraged pt to ambulate w/ nsg during the day.     Time Calculation:    PT Charges     Row Name 05/02/23 0931             Time Calculation    Start Time 0852  -      Stop Time 0905  -      Time Calculation (min) 13 min  -      PT Received On 05/02/23  -      PT - Next Appointment 05/03/23  -            User Key  (r) = Recorded By, (t) = Taken By, (c) = Cosigned By    Initials Name Provider Type     Jennifer Frias PTA Physical Therapist Assistant              Therapy Charges for Today     Code Description Service Date Service Provider Modifiers Qty    [id number] HC GAIT TRAINING EA 15 MIN 5/2/2023 Jennifer Frias PTA GP 1          PT G-Codes  Outcome Measure Options: AM-PAC 6 Clicks Basic Mobility (PT)  AM-PAC 6 Clicks Score (PT): 20  PT Discharge Summary  Anticipated Discharge Disposition (PT): home with home health    Jennifer Frias PTA  5/2/2023

## 2023-05-02 NOTE — PROGRESS NOTES
Name: Nito Corado ADMIT: 2023   : 1940  PCP: Jean Weaver MD    MRN: [id number] LOS: 4 days   AGE/SEX: 82 y.o. male  ROOM: [facility]     Subjective   Subjective   No chief complaint on file.    No cp. soa improving. No nvd.     Objective   Objective   Vital Signs  Temp:  [97.3 °F (36.3 °C)-97.6 °F (36.4 °C)] 97.5 °F (36.4 °C)  Heart Rate:  [73-85] 77  Resp:  [16-18] 18  BP: (134-158)/(78-98) 158/98  SpO2:  [94 %-96 %] 96 %  on  Flow (L/min):  [2.5-3] 2.5;   Device (Oxygen Therapy): nasal cannula  Body mass index is 25.83 kg/m².    Physical Exam  Vitals and nursing note reviewed.   Constitutional:       General: He is not in acute distress.     Appearance: He is not diaphoretic.   Cardiovascular:      Rate and Rhythm: Normal rate. Rhythm irregular.      Pulses: Normal pulses.   Pulmonary:      Effort: Pulmonary effort is normal.      Breath sounds: No wheezing.   Abdominal:      General: There is no distension.      Palpations: Abdomen is soft.      Tenderness: There is no abdominal tenderness.   Skin:     General: Skin is warm and dry.   Neurological:      Mental Status: He is alert.   Psychiatric:         Mood and Affect: Mood normal.         Behavior: Behavior normal.       Results Review  I reviewed the patient's new clinical results.    Results from last 7 days   Lab Units 23  1122 23  1118 23  04223  1034   WBC 10*3/mm3  --   --  5.90 7.08 6.50   HEMOGLOBIN g/dL  --   --  14.0 14.4 13.1   HEMOGLOBIN, POC g/dL 14.3 14.3  --   --   --    PLATELETS 10*3/mm3  --   --  231 210 207     Results from last 7 days   Lab Units 23  0409 23  0322 23  0429 23  0925 23  1034   SODIUM mmol/L 142 141 142  --  139   POTASSIUM mmol/L 3.8 3.7 3.8 4.1 3.1*   CHLORIDE mmol/L 107 101 102  --  103   CO2 mmol/L 25.0 29.0 27.9  --  26.6   BUN mg/dL 35* 40* 30*  --  15   CREATININE mg/dL 1.18 1.30* 1.22  --  0.95   GLUCOSE mg/dL 98 106* 117*   --  101*   EGFR mL/min/1.73 62.4 55.5* 59.9*  --  80.9     Results from last 7 days   Lab Units 04/28/23  1034   ALBUMIN g/dL 3.4*   BILIRUBIN mg/dL 0.5   ALK PHOS U/L 59   AST (SGOT) U/L 9   ALT (SGPT) U/L 7     Results from last 7 days   Lab Units 05/02/23  0409 05/01/23  0322 04/30/23  0429 04/28/23  1034   CALCIUM mg/dL 8.7 8.9 9.5 9.6   ALBUMIN g/dL  --   --   --  3.4*   MAGNESIUM mg/dL  --   --  2.1  --          No results found for: HGBA1C, POCGLU    No radiology results for the last day    I have personally reviewed all medications:  Scheduled Medications  atorvastatin, 20 mg, Oral, Nightly  carvedilol, 6.25 mg, Oral, BID With Meals  folic acid, 1 mg, Oral, Daily  losartan, 12.5 mg, Oral, Q24H  methotrexate, 20 mg, Oral, Weekly  pantoprazole, 40 mg, Oral, Q AM  predniSONE, 10 mg, Oral, Daily With Breakfast  sodium chloride, 10 mL, Intravenous, Q12H  spironolactone, 12.5 mg, Oral, Daily  sulfaSALAzine, 1,000 mg, Oral, BID      Infusions     Diet  Diet: Cardiac Diets; Healthy Heart (2-3 Na+); Texture: Regular Texture (IDDSI 7); Fluid Consistency: Thin (IDDSI 0)    I have personally reviewed:  [x]  Laboratory   []  Microbiology   [x]  Radiology   [x]  EKG/Telemetry  [x]  Cardiology/Vascular   []  Pathology    []  Records       Assessment/Plan     Active Hospital Problems    Diagnosis  POA   • **CHF (congestive heart failure) [I50.9]  Yes   • Delirium due to another medical condition [F05]  Unknown   • Nonrheumatic aortic valve insufficiency [I35.1]  Yes   • Coronary artery disease involving native coronary artery of native heart without angina pectoris [I25.10]  Yes   • MAXINE on CPAP [G47.33, Z99.89]  Not Applicable   • Essential hypertension [I10]  Yes   • Atrial fibrillation [I48.91]  Yes      Resolved Hospital Problems   No resolved problems to display.       82 y.o. male admitted with CHF (congestive heart failure).    • Hallucination/encephalopathy: Improving.  Potentially early dementia.  Would recommend  repeat TSH as an outpatient to monitor potential subclinical hypothyroidism.  Also would recommend outpatient follow-up with primary care or consider neurologist referral if symptoms prove progressive.  • Hypertension: Acceptable acutely.  Adjustment per cardiology needs.  • Acute on chronic systolic heart failure/permanent atrial fibrillation  • No objection to discharge when stable from cardiac standpoint.  We will continue to follow.  Please call with any questions or concerns.    Expected Discharge Date: 5/4/2023; Expected Discharge Time:      Gaurav Brito MD  [facility]  05/02/23  12:30 EDT

## 2023-05-03 NOTE — OUTREACH NOTE
Prep Survey    Flowsheet Row Responses   [facility] facility patient discharged from? [place]   Is LACE score < 7 ? No   Eligibility Readm Mgmt   Discharge diagnosis Chronic systolic congestive heart failure   Does the patient have one of the following disease processes/diagnoses(primary or secondary)? CHF   Does the patient have Home health ordered? No   Is there a DME ordered? No   Prep survey completed? Yes          Maricel DOS SANTOS - Registered Nurse

## 2023-05-03 NOTE — CASE MANAGEMENT/SOCIAL WORK
Case Management Discharge Note      Final Note: Home with family support, family to transport home    Provided Post Acute Provider List?: N/A  Provided Post Acute Provider Quality & Resource List?: N/A    Selected Continued Care - Discharged on 5/2/2023 Admission date: 4/27/2023 - Discharge disposition: Home or Self Care    Destination    No services have been selected for the patient.              Durable Medical Equipment    No services have been selected for the patient.              Dialysis/Infusion    No services have been selected for the patient.              Home Medical Care    No services have been selected for the patient.              Therapy    No services have been selected for the patient.              Community Resources    No services have been selected for the patient.              Community & DME    No services have been selected for the patient.                  Transportation Services  Private: Car    Final Discharge Disposition Code: 01 - home or self-care

## 2023-05-05 ENCOUNTER — READMISSION MANAGEMENT (OUTPATIENT)
Dept: CALL CENTER | Facility: HOSPITAL | Age: 83
End: 2023-05-05
Payer: MEDICARE

## 2023-05-05 NOTE — OUTREACH NOTE
CHF Week 1 Survey    Flowsheet Row Responses   [facility] patient discharged from? [place]   Does the patient have one of the following disease processes/diagnoses(primary or secondary)? CHF   CHF Week 1 attempt successful? Yes   Call start time 0823   Call end time 0826   Is patient permission given to speak with other caregiver? Yes   List who call center can speak with spouse   Meds reviewed with patient/caregiver? Yes   Is the patient having any side effects they believe may be caused by any medication additions or changes? No   Does the patient have all medications ordered at discharge? Yes   Is the patient taking all medications as directed (includes completed medication regime)? Yes   Comments regarding appointments Pt to see pcp on this day.   Does the patient have a primary care provider?  Yes   Does the patient have an appointment with their PCP within 7 days of discharge? Yes   Has the patient kept scheduled appointments due by today? N/A   Has home health visited the patient within 72 hours of discharge? N/A   Pulse Ox monitoring None   Psychosocial issues? No   Did the patient receive a copy of their discharge instructions? Yes   Nursing interventions Reviewed instructions with patient   What is the patient's perception of their health status since discharge? Improving   Is the patient able to teach back signs and symptoms of worsening condition? (i.e. weight gain, shortness of air, etc.) Yes   Is the patient/caregiver able to teach back the hierarchy of who to call/visit for symptoms/problems? PCP, Specialist, Home health nurse, Urgent Care, ED, 911 Yes   Is the patient able to teach back Heart Failure Zones? Yes   CHF Zone this Call Green Zone   Green Zone Patient reports doing well    CHF Week 1 call completed? Yes   Wrap up additional comments Spouse reports pt is feeling well. Pt to see pcp on May 5, 2023.    Call end time 0826          Sushila WARD - Registered Nurse

## 2023-05-15 ENCOUNTER — READMISSION MANAGEMENT (OUTPATIENT)
Dept: CALL CENTER | Facility: HOSPITAL | Age: 83
End: 2023-05-15
Payer: MEDICARE

## 2023-05-15 ENCOUNTER — OFFICE VISIT (OUTPATIENT)
Dept: CARDIOLOGY | Facility: CLINIC | Age: 83
End: 2023-05-15
Payer: MEDICARE

## 2023-05-15 VITALS
RESPIRATION RATE: 16 BRPM | WEIGHT: 188 LBS | HEART RATE: 68 BPM | HEIGHT: 72 IN | DIASTOLIC BLOOD PRESSURE: 90 MMHG | OXYGEN SATURATION: 98 % | SYSTOLIC BLOOD PRESSURE: 150 MMHG | BODY MASS INDEX: 25.47 KG/M2

## 2023-05-15 DIAGNOSIS — Z99.89 OSA ON CPAP: ICD-10-CM

## 2023-05-15 DIAGNOSIS — I10 ESSENTIAL HYPERTENSION: ICD-10-CM

## 2023-05-15 DIAGNOSIS — I25.10 CORONARY ARTERY DISEASE INVOLVING NATIVE CORONARY ARTERY OF NATIVE HEART WITHOUT ANGINA PECTORIS: Primary | ICD-10-CM

## 2023-05-15 DIAGNOSIS — I48.11 LONGSTANDING PERSISTENT ATRIAL FIBRILLATION: ICD-10-CM

## 2023-05-15 DIAGNOSIS — G47.33 OSA ON CPAP: ICD-10-CM

## 2023-05-15 DIAGNOSIS — I35.1 NONRHEUMATIC AORTIC VALVE INSUFFICIENCY: ICD-10-CM

## 2023-05-15 DIAGNOSIS — I50.41 ACUTE COMBINED SYSTOLIC AND DIASTOLIC CONGESTIVE HEART FAILURE: ICD-10-CM

## 2023-05-15 RX ORDER — ASPIRIN 81 MG/1
81 TABLET, CHEWABLE ORAL DAILY
COMMUNITY

## 2023-05-15 RX ORDER — LOSARTAN POTASSIUM 25 MG/1
25 TABLET ORAL DAILY
Qty: 90 TABLET | Refills: 3 | Status: SHIPPED | OUTPATIENT
Start: 2023-05-15

## 2023-05-15 NOTE — OUTREACH NOTE
CHF Week 2 Survey    Flowsheet Row Responses   [facility] patient discharged from? [place]   Does the patient have one of the following disease processes/diagnoses(primary or secondary)? CHF   Week 2 attempt successful? Yes   Call start time 1010   Call end time 1014   Discharge diagnosis Chronic systolic congestive heart failure   Person spoke with today (if not patient) and relationship Spouse   Meds reviewed with patient/caregiver? Yes   Is the patient taking all medications as directed (includes completed medication regime)? Yes   Does the patient have a primary care provider?  Yes   Does the patient have an appointment with their PCP within 7 days of discharge? Yes   Comments regarding PCP today   Has the patient kept scheduled appointments due by today? Yes   Has home health visited the patient within 72 hours of discharge? N/A   Pulse Ox monitoring None   Psychosocial issues? No   Did the patient receive a copy of their discharge instructions? Yes   Nursing interventions Reviewed instructions with patient   What is the patient's perception of their health status since discharge? Improving   Nursing interventions Nurse provided patient education   Is the patient able to teach back signs and symptoms of worsening condition? (i.e. weight gain, shortness of air, etc.) Yes   Is the patient/caregiver able to teach back the hierarchy of who to call/visit for symptoms/problems? PCP, Specialist, Home health nurse, Urgent Care, ED, 911 Yes   CHF Zone this Call Green Zone   Green Zone Patient reports doing well, No new or worsening shortness of breath, Physical activity level is normal for you, No new swelling -  feet, ankles and legs look normal for you   Green Zone Interventions Daily weight check, Low sodium diet, Meds as directed   CHF Week 2 call completed? Yes   Wrap up additional comments Wife reports Pt is doing well. She was not aware that he should get wt every morning and will start getting wt.    Call end time 1014          DOMINGA TUSHAR - Registered Nurse

## 2023-05-15 NOTE — PROGRESS NOTES
Date of Office Visit: 05/15/2023  Encounter Provider: MYESHA Davila  Place of Service: [facility] CARDIOLOGY  Patient Name: Nito Corado  :1940  Primary Cardiologist: Dr. Ballesteros    CC:  1 week follow up    Dear Dr. Weaver    HPI: Nito Corado is a pleasant 82 y.o. male who presents 05/15/2023 for cardiac follow up.  He is a new patient to me and I reviewed his past medical records.  In the past he has seen Dr. Capps, most recently Dr. Kumar and will now follow with Dr. Ballesteros. He has a history of chronic atrial fibrillation, hypertension, nonobstructive coronary artery disease, who presents for follow-up.      He presents today in follow-up, he is accompanied by his wife.  He denies any palpitations, shortness of breath, lower extremity edema.  He has not had any dizziness, lightheadedness, syncope or presyncopal episodes.  He denies any chest pain or chest pressure.  He complains of fatigue.  He states the fatigue has been going on for 6 years.  He just cannot walk very far or do very much without having to sit down and rest.  Otherwise he states he feels fairly well.  His right wrist site with good radial pulse and brisk capillary refill.  He is taking his medicines as directed.  He is set up for sleep study in 2023.     The patient reported progressively worsening shortness of breath and generalized weakness over the last few months and especially over the last 2 to 3 weeks.  Patient's wife also indicated that he was having more issues with memory and increased confusion.  He ended up presenting to the [facility]  emergency room on 2023 where his work-up showed 2 normal troponins and unremarkable EKG.  However he did have evidence of volume overload by elevated BNP, chest x-ray and by exam.  He was admitted and diuresed there.  An echocardiogram was repeated showing his LV function had depressed to 45 to 50% and there was evidence of severe  aortic valve regurgitation.  We were contacted at that point regarding transfer to [facility].     Following his transfer he did appear to be volume overloaded.  He was continued on IV diuresis.  We were unable to open the echocardiogram from [facility] so a repeat limited echocardiogram was performed here on 4/28/2023.  This indicated severely depressed left ventricular systolic function with an EF of 20 to 25% and only mild aortic valve regurgitation.  Based on the decline in his LV function and the discrepancy in the aortic valve regurgitation a right and left cardiac catheterization and transesophageal echocardiogram were recommended.     Transesophageal echocardiogram was performed on 4/29/2023.  This showed mildly depressed left ventricular systolic function with an EF of 40 to 45%, severely dilated left atrium, and moderate aortic valve regurgitation.  His right and left cardiac catheterization showed normal left ventricular filling pressures and stable moderate nonobstructive coronary artery disease.  On the day of his admission his creatinine had improved and he was resumed on his low-dose losartan and spironolactone.  He was also started on 20 mg of Lasix daily.  He was to continue on carvedilol and his apixaban for anticoagulation was resumed.  He was started on atorvastatin which had been stopped due to myalgias that was ultimately found secondary to a diagnosis of rheumatoid arthritis.     Of note during his admission he was noted to have issues with nocturnal hypoxia.  On further discussion with the patient's wife she indicated that he does have a diagnosis of obstructive sleep apnea and was compliant with the CPAP until August.  Around that time he was diagnosed with COVID and did not use his CPAP during that period of time.  When he tried to use his CPAP again after recovering from COVID he reportedly felt like it was not working as well and has not used it over the last  several months.  A referral was sent to sleep medicine.  He was also discussed with his wife that it was imperative to treat his sleep apnea secondary to his cardiomyopathy. On his initial evening of his admission the patient was belligerent and confused.  Medicine was consulted to assist with management.  There is concern that his behavior may represent early dementia.  It was recommended that he be checked for subclinical hypothyroidism and to follow-up with his primary care and also consider neurology referral if his systems progressed.     4/28/2023 Echo Interpretation Summary  •  Left ventricular systolic function is severely decreased. Calculated left ventricular EF = 22% Left ventricular ejection fraction appears to be 21 - 25%. Normal left ventricular cavity size and wall thickness noted. There is left ventricular global hypokinesis noted. Left ventricular diastolic function was indeterminate.  •  Mild aortic valve regurgitation is present.  •  Trace mitral valve regurgitation is present.     5/1/2023 cardiac catheterization - Conclusion:  1.  Stable nonobstructive coronary artery disease:  *Stable 30 to 40% mid LAD stenosis  *Stable 60% proximal ramus intermedius stenosis  *Normal left main, left circumflex, and right coronary arteries  2.  Normal left ventricular filling pressures  3.  Normal pulmonary pressures  RECOMMENDATIONS:  Continue medical management of cardiomyopathy.  Continue carvedilol.  We will resume losartan and possibly spironolactone depending on his renal function in the morning.  Hold off on further diuresis at this time.    5/1/2023 DENG Interpretation Summary  •  Left ventricular systolic function is mildly decreased. Left ventricular ejection fraction appears to be 41 - 45%. Normal left ventricular cavity size noted. Left ventricular wall thickness is consistent with mild to moderate concentric hypertrophy. There is left ventricular global hypokinesis noted. Left ventricular diastolic  function was not assessed.  •  The left atrial cavity is severely dilated. There is dense spontaneous echo contrast present in the atrial body and in the atrial appendage. Saline test results are negative.  •  The aortic valve is abnormal in structure. There is moderate calcification of the aortic valve. There is moderate thickening of the aortic valve. The aortic valve appears trileaflet. Moderate aortic valve regurgitation is present. No hemodynamically significant aortic valve stenosis is present.  •  The inferior vena cava is dilated. IVC inspiratory collapse is absent.     He saw Dr. Stacy terry in December 2022.  For follow-up.    Since last he was seen,  he has been diagnosed with rheumatoid.  He is currently on prednisone therapy.  There are some plans to start him on therapy per his rheumatologist but he required a bronchoscopy first which was performed about a month ago.  He is supposed to have a repeat chest x-ray performed before he can be cleared to undergo further therapy for his rheumatoid arthritis.  He is having a lot of issues with just generalized fatigue.  The patient's wife does not think the rheumatologist thinks is possible that some of his symptoms are due to his rheumatoid arthritis and/or chronic prednisone use.  He does not believe report any worsening dyspnea on exertion.  Denies any chest pain, palpitations, orthopnea, edema or syncope.  His chronic bilateral lower extremity swelling is unchanged.  He has had worsening issues with urinary retention due to BPH and currently has an indwelling Girard catheter in place.  He is scheduled to undergo prostate surgery with Dr. Pantoja on 12/29/2022 at [facility].     Prior History:  The patient was previously followed by Dr. Ramirez who he last saw in 5/2019.  He initially saw Dr. Ramirez in 2/2017 with new diagnosis of atrial fibrillation.  When he saw Dr. Ramirez at the time was the first time he had began seeing physicians again.  He was seen by   Haile around that time with complaints of dyspnea.  He was noted to be in atrial fibrillation with a new right bundle branch block at the time.  It was unclear how long he had been in atrial fibrillation.  It was recommended that he start metoprolol tartrate 25 mg twice a day for rate control and apixaban for anticoagulation.  It was also set up for a stress test and an echocardiogram.  These were performed in 3/2017.  His stress test showed evidence of apical ischemia.  His echocardiogram showed normal left ventricular systolic function wall motion with an EF of 50 to 55%, moderately dilated left atrium, and moderate aortic regurgitation.       At the time Dr. Ramirez recommended proceeding with a cardioversion to see if this would help with his symptoms.  He underwent cardioversion in 3/2017 but he only lasted in sinus rhythm for about 10 minutes.  He was then placed on propafenone and known and another attempted a cardioversion was performed in 5/2017.  This time around he remained in atrial fibrillation for just about a day.  He noted no significant change in his symptoms the day he remained in sinus rhythm.  He was then evaluated by Dr. Marti in 7/2017.  When he saw Dr. Marti at that time he reported fatigue starting 2 years prior but that the shortness of breath started earlier that year.  Dr. Marti did not recommend any further attempts to obtain sinus rhythm and recommended that the patient stop propafenone and undergo a sleep evaluation for sleep apnea.  It appears that since then he has been diagnosed with sleep apnea and has been compliant with CPAP.     Dr. Kumar initially saw him in 1/2020 at which time his symptoms were largely unchanged.  She recommended that he try and increase his activity before proceeding with further cardiac work up.      In follow-up in 7/2020 he reported progression in his dyspnea on exertion and fatigue.  He admitted he really had not tried increasing his activity because  of the dyspnea associated with any attempt.  Following that office visit I recommended proceeding with a stress test and an echocardiogram.  He underwent both a stress test and the echocardiogram on 9/3/2020.  His echocardiogram showed normal left ventricular systolic function wall motion with an EF of 55 to 60% and evidence of severe aortic regurgitation.  Stress test showed findings of inferior and apical ischemia.  Recommended proceeding with a cardiac catheterization which was performed on 9/21/2020.  This showed mild to moderate nonobstructive coronary artery disease primarily involving a ramus intermedius branch.  FFR of this vessel was normal at 0.95.  Recommended medical management at that time.     In 12/2020 patient continued to complain of dyspnea on exertion and fatigue.  I rereviewed his echocardiographic images with Dr. Dowling who agreed that his aortic valve regurgitation was at least moderate and could be severe.  We decided to repeat echocardiogram with limited images at the main office.  This was performed on 1/18/2021 and showed mild aortic regurgitation with normal left ventricular systolic function and an EF of 59%.     In follow-up the patient developed issues with muscle and joint pain.  Ended up stopping the low-dose atorvastatin to see if this was contributing to her symptoms.  In follow-up he reported improvement in the symptoms but it was unclear if he is related to the discontinuation of atorvastatin.     Past Medical History:   Diagnosis Date   • Allergic rhinitis    • Atrial fibrillation    • Benign hypertension    • History of bleeding ulcers    • Marginal perforation of tympanic membrane    • Otorrhea    • PAF (paroxysmal atrial fibrillation)        Past Surgical History:   Procedure Laterality Date   • CARDIAC CATHETERIZATION N/A 9/21/2020    Procedure: Coronary angiography;  Surgeon: Nita Kumar MD;  Location: [facility] INVASIVE LOCATION;  Service: Cardiovascular;  Laterality:  N/A;   • CARDIAC CATHETERIZATION N/A 2020    Procedure: Left heart cath;  Surgeon: Nita Kumar MD;  Location:  [facility] CATH INVASIVE LOCATION;  Service: Cardiovascular;  Laterality: N/A;   • CARDIAC CATHETERIZATION N/A 2020    Procedure: Left ventriculography;  Surgeon: Ntia Kumar MD;  Location:  [facility] CATH INVASIVE LOCATION;  Service: Cardiovascular;  Laterality: N/A;   • CARDIAC CATHETERIZATION  2020    Procedure: Functional Flow [place];  Surgeon: Nita Kumar MD;  Location:  [facility] CATH INVASIVE LOCATION;  Service: Cardiovascular;;   • CARDIAC CATHETERIZATION N/A 2023    Procedure: Right and Left Heart Cath;  Surgeon: Nita Kumar MD;  Location:  [facility] CATH INVASIVE LOCATION;  Service: Cardiovascular;  Laterality: N/A;   • CARDIAC CATHETERIZATION N/A 2023    Procedure: Coronary angiography;  Surgeon: Nita Kumar MD;  Location:  [facility] CATH INVASIVE LOCATION;  Service: Cardiovascular;  Laterality: N/A;   • EXTERNAL EAR SURGERY Left    • KNEE SURGERY         Social History     Socioeconomic History   • Marital status:    Tobacco Use   • Smoking status: Former     Types: Cigarettes     Quit date: 1970     Years since quittin.4   • Smokeless tobacco: Former   • Tobacco comments:     Caffiene daily    Vaping Use   • Vaping Use: Never used   Substance and Sexual Activity   • Alcohol use: Yes     Alcohol/week: 1.0 standard drink     Types: 1 Shots of liquor per week   • Drug use: No   • Sexual activity: Defer       Family History   Problem Relation Age of Onset   • Tuberculosis Father    • No Known Problems Mother        The following portion of the patient's history were reviewed and updated as appropriate: past medical history, past surgical history, past social history, past family history, allergies, current medications, and problem list.    Review of Systems   Constitutional: Positive for malaise/fatigue. Negative for diaphoresis and fever.   HENT:  Negative for congestion, hearing loss, hoarse voice, nosebleeds and sore throat.    Eyes: Negative for photophobia, vision loss in left eye, vision loss in right eye and visual disturbance.   Cardiovascular: Negative for chest pain, dyspnea on exertion, irregular heartbeat, leg swelling, near-syncope, orthopnea, palpitations, paroxysmal nocturnal dyspnea and syncope.   Respiratory: Negative for cough, hemoptysis, shortness of breath, sleep disturbances due to breathing, snoring, sputum production and wheezing.    Endocrine: Negative for cold intolerance, heat intolerance, polydipsia, polyphagia and polyuria.   Hematologic/Lymphatic: Negative for bleeding problem. Does not bruise/bleed easily.   Skin: Negative for color change, dry skin, poor wound healing, rash and suspicious lesions.   Musculoskeletal: Positive for arthritis. Negative for back pain, falls, gout, joint pain, joint swelling, muscle cramps, muscle weakness and myalgias.   Gastrointestinal: Negative for bloating, abdominal pain, constipation, diarrhea, dysphagia, melena, nausea and vomiting.   Neurological: Negative for excessive daytime sleepiness, dizziness, headaches, light-headedness, loss of balance, numbness, paresthesias, seizures, vertigo and weakness.   Psychiatric/Behavioral: Negative for depression, memory loss and substance abuse. The patient is not nervous/anxious.        Allergies   Allergen Reactions   • [place] Cough         Current Outpatient Medications:   •  aspirin 81 MG chewable tablet, Chew 1 tablet Daily., Disp: , Rfl:   •  atorvastatin (LIPITOR) 20 MG tablet, Take 1 tablet by mouth Every Night., Disp: 90 tablet, Rfl: 1  •  carvedilol (COREG) 6.25 MG tablet, Take 1 tablet by mouth 2 (Two) Times a Day With Meals., Disp: 180 tablet, Rfl: 1  •  Eliquis 5 MG tablet tablet, Take 1 tablet by mouth twice daily, Disp: 180 tablet, Rfl: 3  •  folic acid (FOLVITE) 1 MG tablet, Take 1 tablet by mouth Daily., Disp: , Rfl:   •   "furosemide (LASIX) 20 MG tablet, Take 1 tablet by mouth Daily., Disp: 30 tablet, Rfl: 5  •  losartan (COZAAR) 25 MG tablet, Take 0.5 tablets by mouth Daily., Disp: 45 tablet, Rfl: 1  •  methotrexate 2.5 MG tablet, Take 8 tablets by mouth 1 (One) Time Per Week. SATURDAY, Disp: , Rfl:   •  omeprazole (priLOSEC) 20 MG capsule, 1 capsule Daily., Disp: , Rfl:   •  spironolactone (ALDACTONE) 25 MG tablet, Take 0.5 tablets by mouth Daily., Disp: 45 tablet, Rfl: 1  •  sulfaSALAzine (AZULFIDINE) 500 MG tablet, Take 2 tablets by mouth 2 (Two) Times a Day., Disp: , Rfl:         Objective:     Vitals:    05/15/23 1314   BP: 150/90   Pulse: 68   Resp: 16   SpO2: 98%   Weight: 85.3 kg (188 lb)   Height: 182.9 cm (72\")     Body mass index is 25.5 kg/m².      Vitals reviewed.   Constitutional:       General: Not in acute distress.     Appearance: Well-developed and not in distress.   Eyes:      General:         Right eye: No discharge.         Left eye: No discharge.      Conjunctiva/sclera: Conjunctivae normal.   HENT:      Head: Normocephalic and atraumatic.      Right Ear: External ear normal.      Left Ear: External ear normal.      Nose: Nose normal.   Neck:      Thyroid: No thyromegaly.      Vascular: No JVD.      Trachea: No tracheal deviation.      Lymphadenopathy: No cervical adenopathy.   Pulmonary:      Effort: Pulmonary effort is normal. No respiratory distress.      Breath sounds: Normal breath sounds. No wheezing. No rales.   Chest:      Chest wall: Not tender to palpatation.   Cardiovascular:      Normal rate. Occasional ectopic beats. Irregularly irregular rhythm.      No gallop.   Pulses:     Intact distal pulses.   Edema:     Peripheral edema absent.   Abdominal:      General: There is no distension.      Palpations: Abdomen is soft.      Tenderness: There is no abdominal tenderness.   Musculoskeletal: Normal range of motion.         General: No tenderness or deformity.      Cervical back: Normal range of motion " and neck supple. Skin:     General: Skin is warm and dry.      Findings: No erythema or rash.   Neurological:      Mental Status: Alert and oriented to person, place, and time.      Coordination: Coordination normal.   Psychiatric:         Attention and Perception: Attention normal.         Mood and Affect: Mood normal.         Behavior: Behavior normal.               ECG 12 Lead    Date/Time: 5/15/2023 1:20 PM  Performed by: Dominique Nava APRN  Authorized by: Dominique aNva APRN   Comparison: compared with previous ECG from 4/28/2023  Similar to previous ECG  Rhythm: atrial fibrillation  Ectopy: infrequent PVCs  Rate: normal  Conduction: right bundle branch block  Q waves: II, III and aVF    ST Segments: ST segments normal  T Waves: T waves normal  QRS axis: left    Clinical impression: abnormal EKG              Assessment:       Diagnosis Plan   1. Coronary artery disease involving native coronary artery of native heart without angina pectoris        2. Nonrheumatic aortic valve insufficiency        3. Acute combined systolic and diastolic congestive heart failure        4. Essential hypertension        5. Longstanding persistent atrial fibrillation        6. MAXINE on CPAP               Plan:     1.  Acute systolic congestive heart failure.   Low to normal filling pressures by cath on 5/1.  Appears euvolemic.  2.  Aortic valve regurgitation.  Moderate by DENG.    3.  Cardiomyopathy.  EF reportedly 20-25% by TTE but appears better around 40-45% by DENG.  New decline regardless.  I am going to increase his losartan up to 25 mg daily.  4.  Coronary artery disease.  Stable non-obstructive disease by cath on 5/1.  Denies angina.    5.  Hypertension.  Still mildly elevated.  I am going to increase his losartan back up to 25 mg daily.  6.  Permanent atrial fibrillation.  He remains in atrial fibrillation, rate controlled.  He is back on his Eliquis 5 mg daily.  This was held for his cardiac catheterization.  7.  Rheumatoid  arthritis.  On prednisone 10 mg daily, methotrexate once weekly, and sulfasalazine.  8.  Confusion/altered mental status. Felt to be delirium.  He appears alert and oriented x3.  He does look to his wife at times to ensure that his answers are correct.  9.  MAYA.  Improved.  His creatinine is now at 1.18.    He states he has appointment with PCP on Monday.  I am increasing his losartan to 25 mg a day.  He will have his labs checked at his PCP next week.  I have given him an order.  Keep appointment with Dr. Ballesteros for 6/2/2023    As always, it has been a pleasure to participate in your patient's care. Thank you.       Sincerely,       MYESHA Davila      Current Outpatient Medications:   •  aspirin 81 MG chewable tablet, Chew 1 tablet Daily., Disp: , Rfl:   •  atorvastatin (LIPITOR) 20 MG tablet, Take 1 tablet by mouth Every Night., Disp: 90 tablet, Rfl: 1  •  carvedilol (COREG) 6.25 MG tablet, Take 1 tablet by mouth 2 (Two) Times a Day With Meals., Disp: 180 tablet, Rfl: 1  •  Eliquis 5 MG tablet tablet, Take 1 tablet by mouth twice daily, Disp: 180 tablet, Rfl: 3  •  folic acid (FOLVITE) 1 MG tablet, Take 1 tablet by mouth Daily., Disp: , Rfl:   •  furosemide (LASIX) 20 MG tablet, Take 1 tablet by mouth Daily., Disp: 30 tablet, Rfl: 5  •  methotrexate 2.5 MG tablet, Take 8 tablets by mouth 1 (One) Time Per Week. SATURDAY, Disp: , Rfl:   •  omeprazole (priLOSEC) 20 MG capsule, 1 capsule Daily., Disp: , Rfl:   •  spironolactone (ALDACTONE) 25 MG tablet, Take 0.5 tablets by mouth Daily., Disp: 45 tablet, Rfl: 1  •  sulfaSALAzine (AZULFIDINE) 500 MG tablet, Take 2 tablets by mouth 2 (Two) Times a Day., Disp: , Rfl:   •  losartan (Cozaar) 25 MG tablet, Take 1 tablet by mouth Daily., Disp: 90 tablet, Rfl: 3      Dictated utilizing Dragon dictation

## 2023-05-15 NOTE — LETTER
May 15, 2023       No Recipients    Patient: Nito Corado   YOB: 1940   Date of Visit: 5/15/2023       Dear Dr. England Recipients:    Thank you for referring Nito Corado to me for evaluation. Below are the relevant portions of my assessment and plan of care.    If you have questions, please do not hesitate to call me. I look forward to following Nito along with you.         Sincerely,        MYESHA Ojeda        CC:   No Recipients    Dominique Nava APRN  05/15/23 1351  Signed    Date of Office Visit: 05/15/2023  Encounter Provider: MYESHA Davila  Place of Service: [facility] CARDIOLOGY  Patient Name: Nito Corado  :1940  Primary Cardiologist: Dr. Ballesteros    CC:  1 week follow up    Dear Dr. Weaver    HPI: Nito Corado is a pleasant 82 y.o. male who presents 05/15/2023 for cardiac follow up.  He is a new patient to me and I reviewed his past medical records.  In the past he has seen Dr. Capps, most recently Dr. Kumar and will now follow with Dr. Ballesteros. He has a history of chronic atrial fibrillation, hypertension, nonobstructive coronary artery disease, who presents for follow-up.      He presents today in follow-up, he is accompanied by his wife.  He denies any palpitations, shortness of breath, lower extremity edema.  He has not had any dizziness, lightheadedness, syncope or presyncopal episodes.  He denies any chest pain or chest pressure.  He complains of fatigue.  He states the fatigue has been going on for 6 years.  He just cannot walk very far or do very much without having to sit down and rest.  Otherwise he states he feels fairly well.  His right wrist site with good radial pulse and brisk capillary refill.  He is taking his medicines as directed.  He is set up for sleep study in 2023.     The patient reported progressively worsening shortness of breath and generalized weakness over the last few months and especially over  the last 2 to 3 weeks.  Patient's wife also indicated that he was having more issues with memory and increased confusion.  He ended up presenting to the [facility]  emergency room on 4/28/2023 where his work-up showed 2 normal troponins and unremarkable EKG.  However he did have evidence of volume overload by elevated BNP, chest x-ray and by exam.  He was admitted and diuresed there.  An echocardiogram was repeated showing his LV function had depressed to 45 to 50% and there was evidence of severe aortic valve regurgitation.  We were contacted at that point regarding transfer to [facility].     Following his transfer he did appear to be volume overloaded.  He was continued on IV diuresis.  We were unable to open the echocardiogram from [facility] so a repeat limited echocardiogram was performed here on 4/28/2023.  This indicated severely depressed left ventricular systolic function with an EF of 20 to 25% and only mild aortic valve regurgitation.  Based on the decline in his LV function and the discrepancy in the aortic valve regurgitation a right and left cardiac catheterization and transesophageal echocardiogram were recommended.     Transesophageal echocardiogram was performed on 4/29/2023.  This showed mildly depressed left ventricular systolic function with an EF of 40 to 45%, severely dilated left atrium, and moderate aortic valve regurgitation.  His right and left cardiac catheterization showed normal left ventricular filling pressures and stable moderate nonobstructive coronary artery disease.  On the day of his admission his creatinine had improved and he was resumed on his low-dose losartan and spironolactone.  He was also started on 20 mg of Lasix daily.  He was to continue on carvedilol and his apixaban for anticoagulation was resumed.  He was started on atorvastatin which had been stopped due to myalgias that was ultimately found secondary to a diagnosis of rheumatoid  arthritis.     Of note during his admission he was noted to have issues with nocturnal hypoxia.  On further discussion with the patient's wife she indicated that he does have a diagnosis of obstructive sleep apnea and was compliant with the CPAP until August.  Around that time he was diagnosed with COVID and did not use his CPAP during that period of time.  When he tried to use his CPAP again after recovering from COVID he reportedly felt like it was not working as well and has not used it over the last several months.  A referral was sent to sleep medicine.  He was also discussed with his wife that it was imperative to treat his sleep apnea secondary to his cardiomyopathy. On his initial evening of his admission the patient was belligerent and confused.  Medicine was consulted to assist with management.  There is concern that his behavior may represent early dementia.  It was recommended that he be checked for subclinical hypothyroidism and to follow-up with his primary care and also consider neurology referral if his systems progressed.     4/28/2023 Echo Interpretation Summary  •  Left ventricular systolic function is severely decreased. Calculated left ventricular EF = 22% Left ventricular ejection fraction appears to be 21 - 25%. Normal left ventricular cavity size and wall thickness noted. There is left ventricular global hypokinesis noted. Left ventricular diastolic function was indeterminate.  •  Mild aortic valve regurgitation is present.  •  Trace mitral valve regurgitation is present.     5/1/2023 cardiac catheterization - Conclusion:  1.  Stable nonobstructive coronary artery disease:  *Stable 30 to 40% mid LAD stenosis  *Stable 60% proximal ramus intermedius stenosis  *Normal left main, left circumflex, and right coronary arteries  2.  Normal left ventricular filling pressures  3.  Normal pulmonary pressures  RECOMMENDATIONS:  Continue medical management of cardiomyopathy.  Continue carvedilol.  We will  resume losartan and possibly spironolactone depending on his renal function in the morning.  Hold off on further diuresis at this time.    5/1/2023 DENG Interpretation Summary  •  Left ventricular systolic function is mildly decreased. Left ventricular ejection fraction appears to be 41 - 45%. Normal left ventricular cavity size noted. Left ventricular wall thickness is consistent with mild to moderate concentric hypertrophy. There is left ventricular global hypokinesis noted. Left ventricular diastolic function was not assessed.  •  The left atrial cavity is severely dilated. There is dense spontaneous echo contrast present in the atrial body and in the atrial appendage. Saline test results are negative.  •  The aortic valve is abnormal in structure. There is moderate calcification of the aortic valve. There is moderate thickening of the aortic valve. The aortic valve appears trileaflet. Moderate aortic valve regurgitation is present. No hemodynamically significant aortic valve stenosis is present.  •  The inferior vena cava is dilated. IVC inspiratory collapse is absent.     He saw Dr. Kumar last in December 2022.  For follow-up.    Since last he was seen,  he has been diagnosed with rheumatoid.  He is currently on prednisone therapy.  There are some plans to start him on therapy per his rheumatologist but he required a bronchoscopy first which was performed about a month ago.  He is supposed to have a repeat chest x-ray performed before he can be cleared to undergo further therapy for his rheumatoid arthritis.  He is having a lot of issues with just generalized fatigue.  The patient's wife does not think the rheumatologist thinks is possible that some of his symptoms are due to his rheumatoid arthritis and/or chronic prednisone use.  He does not believe report any worsening dyspnea on exertion.  Denies any chest pain, palpitations, orthopnea, edema or syncope.  His chronic bilateral lower extremity swelling is  unchanged.  He has had worsening issues with urinary retention due to BPH and currently has an indwelling Girard catheter in place.  He is scheduled to undergo prostate surgery with Dr. Pantoja on 12/29/2022 at [facility].     Prior History:  The patient was previously followed by Dr. Ramirez who he last saw in 5/2019.  He initially saw Dr. Ramirez in 2/2017 with new diagnosis of atrial fibrillation.  When he saw Dr. Ramirez at the time was the first time he had began seeing physicians again.  He was seen by Dr. Be around that time with complaints of dyspnea.  He was noted to be in atrial fibrillation with a new right bundle branch block at the time.  It was unclear how long he had been in atrial fibrillation.  It was recommended that he start metoprolol tartrate 25 mg twice a day for rate control and apixaban for anticoagulation.  It was also set up for a stress test and an echocardiogram.  These were performed in 3/2017.  His stress test showed evidence of apical ischemia.  His echocardiogram showed normal left ventricular systolic function wall motion with an EF of 50 to 55%, moderately dilated left atrium, and moderate aortic regurgitation.       At the time Dr. Ramirez recommended proceeding with a cardioversion to see if this would help with his symptoms.  He underwent cardioversion in 3/2017 but he only lasted in sinus rhythm for about 10 minutes.  He was then placed on propafenone and known and another attempted a cardioversion was performed in 5/2017.  This time around he remained in atrial fibrillation for just about a day.  He noted no significant change in his symptoms the day he remained in sinus rhythm.  He was then evaluated by Dr. Marti in 7/2017.  When he saw Dr. Marti at that time he reported fatigue starting 2 years prior but that the shortness of breath started earlier that year.  Dr. Marti did not recommend any further attempts to obtain sinus rhythm and recommended that the patient stop  propafenone and undergo a sleep evaluation for sleep apnea.  It appears that since then he has been diagnosed with sleep apnea and has been compliant with CPAP.     Dr. Kumar initially saw him in 1/2020 at which time his symptoms were largely unchanged.  She recommended that he try and increase his activity before proceeding with further cardiac work up.      In follow-up in 7/2020 he reported progression in his dyspnea on exertion and fatigue.  He admitted he really had not tried increasing his activity because of the dyspnea associated with any attempt.  Following that office visit I recommended proceeding with a stress test and an echocardiogram.  He underwent both a stress test and the echocardiogram on 9/3/2020.  His echocardiogram showed normal left ventricular systolic function wall motion with an EF of 55 to 60% and evidence of severe aortic regurgitation.  Stress test showed findings of inferior and apical ischemia.  Recommended proceeding with a cardiac catheterization which was performed on 9/21/2020.  This showed mild to moderate nonobstructive coronary artery disease primarily involving a ramus intermedius branch.  FFR of this vessel was normal at 0.95.  Recommended medical management at that time.     In 12/2020 patient continued to complain of dyspnea on exertion and fatigue.  I rereviewed his echocardiographic images with Dr. Dowling who agreed that his aortic valve regurgitation was at least moderate and could be severe.  We decided to repeat echocardiogram with limited images at the main office.  This was performed on 1/18/2021 and showed mild aortic regurgitation with normal left ventricular systolic function and an EF of 59%.     In follow-up the patient developed issues with muscle and joint pain.  Ended up stopping the low-dose atorvastatin to see if this was contributing to her symptoms.  In follow-up he reported improvement in the symptoms but it was unclear if he is related to the  discontinuation of atorvastatin.     Past Medical History:   Diagnosis Date   • Allergic rhinitis    • Atrial fibrillation    • Benign hypertension    • History of bleeding ulcers    • Marginal perforation of tympanic membrane    • Otorrhea    • PAF (paroxysmal atrial fibrillation)        Past Surgical History:   Procedure Laterality Date   • CARDIAC CATHETERIZATION N/A 2020    Procedure: Coronary angiography;  Surgeon: Nita Kumar MD;  Location:  [facility];  Service: Cardiovascular;  Laterality: N/A;   • CARDIAC CATHETERIZATION N/A 2020    Procedure: Left heart cath;  Surgeon: Nita Kumar MD;  Location:  [facility];  Service: Cardiovascular;  Laterality: N/A;   • CARDIAC CATHETERIZATION N/A 2020    Procedure: Left ventriculography;  Surgeon: Nita Kumar MD;  Location:  [facility];  Service: Cardiovascular;  Laterality: N/A;   • CARDIAC CATHETERIZATION  2020    Procedure: Functional Flow [place];  Surgeon: Nita Kumar MD;  Location:  [facility];  Service: Cardiovascular;;   • CARDIAC CATHETERIZATION N/A 2023    Procedure: Right and Left Heart Cath;  Surgeon: Nita Kumar MD;  Location:  [facility];  Service: Cardiovascular;  Laterality: N/A;   • CARDIAC CATHETERIZATION N/A 2023    Procedure: Coronary angiography;  Surgeon: Nita Kumar MD;  Location:  [facility];  Service: Cardiovascular;  Laterality: N/A;   • EXTERNAL EAR SURGERY Left    • KNEE SURGERY         Social History     Socioeconomic History   • Marital status:    Tobacco Use   • Smoking status: Former     Types: Cigarettes     Quit date: 1970     Years since quittin.4   • Smokeless tobacco: Former   • Tobacco comments:     Caffiene daily    Vaping Use   • Vaping Use: Never used   Substance and Sexual Activity   • Alcohol use: Yes     Alcohol/week: 1.0 standard drink     Types:  1 Shots of liquor per week   • Drug use: No   • Sexual activity: Defer       Family History   Problem Relation Age of Onset   • Tuberculosis Father    • No Known Problems Mother        The following portion of the patient's history were reviewed and updated as appropriate: past medical history, past surgical history, past social history, past family history, allergies, current medications, and problem list.    Review of Systems   Constitutional: Positive for malaise/fatigue. Negative for diaphoresis and fever.   HENT: Negative for congestion, hearing loss, hoarse voice, nosebleeds and sore throat.    Eyes: Negative for photophobia, vision loss in left eye, vision loss in right eye and visual disturbance.   Cardiovascular: Negative for chest pain, dyspnea on exertion, irregular heartbeat, leg swelling, near-syncope, orthopnea, palpitations, paroxysmal nocturnal dyspnea and syncope.   Respiratory: Negative for cough, hemoptysis, shortness of breath, sleep disturbances due to breathing, snoring, sputum production and wheezing.    Endocrine: Negative for cold intolerance, heat intolerance, polydipsia, polyphagia and polyuria.   Hematologic/Lymphatic: Negative for bleeding problem. Does not bruise/bleed easily.   Skin: Negative for color change, dry skin, poor wound healing, rash and suspicious lesions.   Musculoskeletal: Positive for arthritis. Negative for back pain, falls, gout, joint pain, joint swelling, muscle cramps, muscle weakness and myalgias.   Gastrointestinal: Negative for bloating, abdominal pain, constipation, diarrhea, dysphagia, melena, nausea and vomiting.   Neurological: Negative for excessive daytime sleepiness, dizziness, headaches, light-headedness, loss of balance, numbness, paresthesias, seizures, vertigo and weakness.   Psychiatric/Behavioral: Negative for depression, memory loss and substance abuse. The patient is not nervous/anxious.        Allergies   Allergen Reactions   • [place]  "Cough         Current Outpatient Medications:   •  aspirin 81 MG chewable tablet, Chew 1 tablet Daily., Disp: , Rfl:   •  atorvastatin (LIPITOR) 20 MG tablet, Take 1 tablet by mouth Every Night., Disp: 90 tablet, Rfl: 1  •  carvedilol (COREG) 6.25 MG tablet, Take 1 tablet by mouth 2 (Two) Times a Day With Meals., Disp: 180 tablet, Rfl: 1  •  Eliquis 5 MG tablet tablet, Take 1 tablet by mouth twice daily, Disp: 180 tablet, Rfl: 3  •  folic acid (FOLVITE) 1 MG tablet, Take 1 tablet by mouth Daily., Disp: , Rfl:   •  furosemide (LASIX) 20 MG tablet, Take 1 tablet by mouth Daily., Disp: 30 tablet, Rfl: 5  •  losartan (COZAAR) 25 MG tablet, Take 0.5 tablets by mouth Daily., Disp: 45 tablet, Rfl: 1  •  methotrexate 2.5 MG tablet, Take 8 tablets by mouth 1 (One) Time Per Week. SATURDAY, Disp: , Rfl:   •  omeprazole (priLOSEC) 20 MG capsule, 1 capsule Daily., Disp: , Rfl:   •  spironolactone (ALDACTONE) 25 MG tablet, Take 0.5 tablets by mouth Daily., Disp: 45 tablet, Rfl: 1  •  sulfaSALAzine (AZULFIDINE) 500 MG tablet, Take 2 tablets by mouth 2 (Two) Times a Day., Disp: , Rfl:        Objective:     Vitals:    05/15/23 1314   BP: 150/90   Pulse: 68   Resp: 16   SpO2: 98%   Weight: 85.3 kg (188 lb)   Height: 182.9 cm (72\")     Body mass index is 25.5 kg/m².      Vitals reviewed.   Constitutional:       General: Not in acute distress.     Appearance: Well-developed and not in distress.   Eyes:      General:         Right eye: No discharge.         Left eye: No discharge.      Conjunctiva/sclera: Conjunctivae normal.   HENT:      Head: Normocephalic and atraumatic.      Right Ear: External ear normal.      Left Ear: External ear normal.      Nose: Nose normal.   Neck:      Thyroid: No thyromegaly.      Vascular: No JVD.      Trachea: No tracheal deviation.      Lymphadenopathy: No cervical adenopathy.   Pulmonary:      Effort: Pulmonary effort is normal. No respiratory distress.      Breath sounds: Normal breath sounds. No " wheezing. No rales.   Chest:      Chest wall: Not tender to palpatation.   Cardiovascular:      Normal rate. Occasional ectopic beats. Irregularly irregular rhythm.      No gallop.   Pulses:     Intact distal pulses.   Edema:     Peripheral edema absent.   Abdominal:      General: There is no distension.      Palpations: Abdomen is soft.      Tenderness: There is no abdominal tenderness.   Musculoskeletal: Normal range of motion.         General: No tenderness or deformity.      Cervical back: Normal range of motion and neck supple. Skin:     General: Skin is warm and dry.      Findings: No erythema or rash.   Neurological:      Mental Status: Alert and oriented to person, place, and time.      Coordination: Coordination normal.   Psychiatric:         Attention and Perception: Attention normal.         Mood and Affect: Mood normal.         Behavior: Behavior normal.               ECG 12 Lead    Date/Time: 5/15/2023 1:20 PM  Performed by: Dominique Nava APRN  Authorized by: Dominique Nava APRN   Comparison: compared with previous ECG from 4/28/2023  Similar to previous ECG  Rhythm: atrial fibrillation  Ectopy: infrequent PVCs  Rate: normal  Conduction: right bundle branch block  Q waves: II, III and aVF    ST Segments: ST segments normal  T Waves: T waves normal  QRS axis: left    Clinical impression: abnormal EKG              Assessment:       Diagnosis Plan   1. Coronary artery disease involving native coronary artery of native heart without angina pectoris        2. Nonrheumatic aortic valve insufficiency        3. Acute combined systolic and diastolic congestive heart failure        4. Essential hypertension        5. Longstanding persistent atrial fibrillation        6. MAXINE on CPAP               Plan:     1.  Acute systolic congestive heart failure.   Low to normal filling pressures by cath on 5/1.  Appears euvolemic.  2.  Aortic valve regurgitation.  Moderate by DENG.    3.  Cardiomyopathy.  EF reportedly 20-25%  by TTE but appears better around 40-45% by DENG.  New decline regardless.  I am going to increase his losartan up to 25 mg daily.  4.  Coronary artery disease.  Stable non-obstructive disease by cath on 5/1.  Denies angina.    5.  Hypertension.  Still mildly elevated.  I am going to increase his losartan back up to 25 mg daily.  6.  Permanent atrial fibrillation.  He remains in atrial fibrillation, rate controlled.  He is back on his Eliquis 5 mg daily.  This was held for his cardiac catheterization.  7.  Rheumatoid arthritis.  On prednisone 10 mg daily, methotrexate once weekly, and sulfasalazine.  8.  Confusion/altered mental status. Felt to be delirium.  He appears alert and oriented x3.  He does look to his wife at times to ensure that his answers are correct.  9.  MAYA.  Improved.  His creatinine is now at 1.18.    He states he has appointment with PCP on Monday.  I am increasing his losartan to 25 mg a day.  He will have his labs checked at his PCP next week.  I have given him an order.  Keep appointment with Dr. Ballesteros for 6/2/2023    As always, it has been a pleasure to participate in your patient's care. Thank you.       Sincerely,       MYESHA Davila      Current Outpatient Medications:   •  aspirin 81 MG chewable tablet, Chew 1 tablet Daily., Disp: , Rfl:   •  atorvastatin (LIPITOR) 20 MG tablet, Take 1 tablet by mouth Every Night., Disp: 90 tablet, Rfl: 1  •  carvedilol (COREG) 6.25 MG tablet, Take 1 tablet by mouth 2 (Two) Times a Day With Meals., Disp: 180 tablet, Rfl: 1  •  Eliquis 5 MG tablet tablet, Take 1 tablet by mouth twice daily, Disp: 180 tablet, Rfl: 3  •  folic acid (FOLVITE) 1 MG tablet, Take 1 tablet by mouth Daily., Disp: , Rfl:   •  furosemide (LASIX) 20 MG tablet, Take 1 tablet by mouth Daily., Disp: 30 tablet, Rfl: 5  •  methotrexate 2.5 MG tablet, Take 8 tablets by mouth 1 (One) Time Per Week. SATURDAY, Disp: , Rfl:   •  omeprazole (priLOSEC) 20 MG capsule, 1 capsule Daily., Disp:  , Rfl:   •  spironolactone (ALDACTONE) 25 MG tablet, Take 0.5 tablets by mouth Daily., Disp: 45 tablet, Rfl: 1  •  sulfaSALAzine (AZULFIDINE) 500 MG tablet, Take 2 tablets by mouth 2 (Two) Times a Day., Disp: , Rfl:   •  losartan (Cozaar) 25 MG tablet, Take 1 tablet by mouth Daily., Disp: 90 tablet, Rfl: 3      Dictated utilizing Dragon dictation

## 2023-06-02 ENCOUNTER — OFFICE VISIT (OUTPATIENT)
Dept: CARDIOLOGY | Facility: CLINIC | Age: 83
End: 2023-06-02

## 2023-06-02 VITALS
SYSTOLIC BLOOD PRESSURE: 144 MMHG | BODY MASS INDEX: 25.19 KG/M2 | HEART RATE: 88 BPM | WEIGHT: 186 LBS | DIASTOLIC BLOOD PRESSURE: 82 MMHG | HEIGHT: 72 IN

## 2023-06-02 DIAGNOSIS — I25.10 CORONARY ARTERY DISEASE INVOLVING NATIVE CORONARY ARTERY OF NATIVE HEART WITHOUT ANGINA PECTORIS: ICD-10-CM

## 2023-06-02 DIAGNOSIS — I10 ESSENTIAL HYPERTENSION: ICD-10-CM

## 2023-06-02 DIAGNOSIS — Z99.89 OSA ON CPAP: ICD-10-CM

## 2023-06-02 DIAGNOSIS — I48.11 LONGSTANDING PERSISTENT ATRIAL FIBRILLATION: ICD-10-CM

## 2023-06-02 DIAGNOSIS — I50.21 ACUTE HFREF (HEART FAILURE WITH REDUCED EJECTION FRACTION): Primary | ICD-10-CM

## 2023-06-02 DIAGNOSIS — G47.33 OSA ON CPAP: ICD-10-CM

## 2023-06-02 DIAGNOSIS — I35.1 NONRHEUMATIC AORTIC VALVE INSUFFICIENCY: ICD-10-CM

## 2023-06-02 RX ORDER — CARVEDILOL 12.5 MG/1
12.5 TABLET ORAL 2 TIMES DAILY WITH MEALS
Qty: 180 TABLET | Refills: 3 | Status: SHIPPED | OUTPATIENT
Start: 2023-06-02

## 2023-06-02 RX ORDER — LOSARTAN POTASSIUM 25 MG/1
25 TABLET ORAL NIGHTLY
Qty: 90 TABLET | Refills: 3 | Status: SHIPPED | OUTPATIENT
Start: 2023-06-02

## 2023-06-02 RX ORDER — FUROSEMIDE 20 MG/1
60 TABLET ORAL 2 TIMES DAILY
Qty: 180 TABLET | Refills: 11 | Status: SHIPPED | OUTPATIENT
Start: 2023-06-02

## 2023-06-02 NOTE — PROGRESS NOTES
Date of Office Visit: 2023  Encounter Provider: Zunilda Ballesteros MD  Place of Service: [facility] CARDIOLOGY  Patient Name: Nito Corado  :1940      Patient ID:  Nito Corado is a 82 y.o. male is here for  followup for CHF.         History of Present Illness    He has a history of chronic atrial fibrillation, hypertension, hyperlipidemia, MAXINE, nonobstructive coronary artery disease, rheumatoid arthritis, chronic HFpEF.  He sees Dr. Pantoja for prostate issues.     He presents today in follow-up, he is accompanied by his wife.  He denies any palpitations, shortness of breath, lower extremity edema.  He has not had any dizziness, lightheadedness, syncope or presyncopal episodes.  He denies any chest pain or chest pressure.  He complains of fatigue.  He states the fatigue has been going on for 6 years.  He just cannot walk very far or do very much without having to sit down and rest.  Otherwise he states he feels fairly well.  His right wrist site with good radial pulse and brisk capillary refill.  He is taking his medicines as directed.  He is set up for sleep study in 2023.     He was at [facility] emergency room on 2023 where his work-up showed 2 normal troponins and unremarkable EKG.  However he did have evidence of volume overload by elevated BNP, chest x-ray and by exam.  He was admitted and diuresed there.  An echocardiogram was repeated showing his LV function had depressed to 45 to 50% and there was evidence of severe aortic valve regurgitation.  He was transferred to [facility].  He did appear to be volume overloaded.  He was continued on IV diuresis.  We were unable to open the echocardiogram from [facility] so a repeat limited echocardiogram was performed 2023.  This indicated severely depressed left ventricular systolic function with an EF of 20 to 25% and only mild aortic valve regurgitation.  Based on  the decline in his LV function and the discrepancy in the aortic valve regurgitation a right and left cardiac catheterization and transesophageal echocardiogram were recommended.  He was discharged 5/2/2023.     Transesophageal echocardiogram was performed on 4/29/2023.  This showed mildly depressed left ventricular systolic function with an EF of 40 to 45%, severely dilated left atrium, and moderate aortic valve regurgitation.  His right and left cardiac catheterization showed normal left ventricular filling pressures and stable moderate nonobstructive coronary artery disease.  Medical management was continued.  He was noted to have issues with nocturnal hypoxia.  He had a diagnosis of sleep apnea but had not been using his CPAP due to COVID.    Labs on 5/2/2023 show normal BMP.  He had a normal CBC done 5/1/2023.  Thyroid study done 4/28/2023 showed TSH 4.9, elevated, normal free T4, HDL 33, , triglycerides 111, total cholesterol 59, proBNP 18,620.     He has no family history of heart disease.  He is , has children, is retired, quit smoking 1970, has daily caffeine and no drugs.    Past Medical History:   Diagnosis Date   • Allergic rhinitis    • Atrial fibrillation    • Benign hypertension    • History of bleeding ulcers    • Marginal perforation of tympanic membrane    • Otorrhea    • PAF (paroxysmal atrial fibrillation)          Past Surgical History:   Procedure Laterality Date   • CARDIAC CATHETERIZATION N/A 9/21/2020    Procedure: Coronary angiography;  Surgeon: Nita Kumar MD;  Location: [facility] CATH INVASIVE LOCATION;  Service: Cardiovascular;  Laterality: N/A;   • CARDIAC CATHETERIZATION N/A 9/21/2020    Procedure: Left heart cath;  Surgeon: Nita Kumar MD;  [facility];  Service: Cardiovascular;  Laterality: N/A;   • CARDIAC CATHETERIZATION N/A 9/21/2020    Procedure: Left ventriculography;  Surgeon: Nita Kumar MD;  [facility]  [facility];  Service: Cardiovascular;  Laterality: N/A;   • CARDIAC CATHETERIZATION  9/21/2020    Procedure: Functional Flow [place];  Surgeon: Nita Kumar MD;  Location:  [facility] CATH INVASIVE LOCATION;  Service: Cardiovascular;;   • CARDIAC CATHETERIZATION N/A 5/1/2023    Procedure: Right and Left Heart Cath;  Surgeon: Nita Kumar MD;  Location:  [facility] CATH INVASIVE LOCATION;  Service: Cardiovascular;  Laterality: N/A;   • CARDIAC CATHETERIZATION N/A 5/1/2023    Procedure: Coronary angiography;  Surgeon: Nita Kumar MD;  Location:  [facility] CATH INVASIVE LOCATION;  Service: Cardiovascular;  Laterality: N/A;   • EXTERNAL EAR SURGERY Left    • KNEE SURGERY         Current Outpatient Medications on File Prior to Visit   Medication Sig Dispense Refill   • atorvastatin (LIPITOR) 20 MG tablet Take 1 tablet by mouth Every Night. 90 tablet 1   • carvedilol (COREG) 6.25 MG tablet Take 1 tablet by mouth 2 (Two) Times a Day With Meals. 180 tablet 1   • Eliquis 5 MG tablet tablet Take 1 tablet by mouth twice daily 180 tablet 3   • folic acid (FOLVITE) 1 MG tablet Take 1 tablet by mouth Daily.     • furosemide (LASIX) 20 MG tablet Take 1 tablet by mouth Daily. (Patient taking differently: Take 3 tablets by mouth 2 (Two) Times a Day.) 30 tablet 5   • losartan (Cozaar) 25 MG tablet Take 1 tablet by mouth Daily. 90 tablet 3   • methotrexate 2.5 MG tablet Take 8 tablets by mouth 1 (One) Time Per Week. SATURDAY     • omeprazole (priLOSEC) 20 MG capsule 1 capsule Daily.     • spironolactone (ALDACTONE) 25 MG tablet Take 0.5 tablets by mouth Daily. 45 tablet 1   • sulfaSALAzine (AZULFIDINE) 500 MG tablet Take 2 tablets by mouth 2 (Two) Times a Day.     • aspirin 81 MG chewable tablet Chew 1 tablet Daily. (Patient not taking: Reported on 6/2/2023)       No current facility-administered medications on file prior to visit.       Social History     Socioeconomic History   • Marital status:    • Number of children: 1  "  Tobacco Use   • Smoking status: Former     Types: Cigarettes     Quit date: 1970     Years since quittin.5     Passive exposure: Past   • Smokeless tobacco: Former   • Tobacco comments:     Caffiene daily    Vaping Use   • Vaping Use: Never used   Substance and Sexual Activity   • Alcohol use: Yes     Alcohol/week: 1.0 standard drink     Types: 1 Shots of liquor per week   • Drug use: No   • Sexual activity: Defer           ROS    Procedures    ECG 12 Lead    Date/Time: 2023 9:19 AM  Performed by: Zunilda Ballesteros MD  Authorized by: Zunilda Ballesteros MD   Comparison: compared with previous ECG   Similar to previous ECG  Rhythm: atrial fibrillation  Conduction: right bundle branch block and left anterior fascicular block    Clinical impression: abnormal EKG                Objective:      Vitals:    23 0908   Weight: 84.4 kg (186 lb)   Height: 182.9 cm (72\")     Body mass index is 25.23 kg/m².    Vitals reviewed.   Constitutional:       General: Not in acute distress.     Appearance: Well-developed. Not diaphoretic.   Eyes:      General: No scleral icterus.     Conjunctiva/sclera: Conjunctivae normal.   HENT:      Head: Normocephalic and atraumatic.   Neck:      Thyroid: No thyromegaly.      Vascular: No carotid bruit or JVD.      Lymphadenopathy: No cervical adenopathy.   Pulmonary:      Effort: Pulmonary effort is normal. No respiratory distress.      Breath sounds: Normal breath sounds. No wheezing. No rhonchi. No rales.   Chest:      Chest wall: Not tender to palpatation.   Cardiovascular:      Normal rate. Irregularly irregular rhythm.      Murmurs: There is no murmur.      No gallop.   Pulses:     Intact distal pulses.   Edema:     Peripheral edema absent.   Abdominal:      General: Bowel sounds are normal. There is no distension or abdominal bruit.      Palpations: Abdomen is soft. There is no abdominal mass.      Tenderness: There is no abdominal tenderness.   Musculoskeletal:    "      General: No deformity.      Extremities: No clubbing present.     Cervical back: Neck supple. Skin:     General: Skin is warm and dry. There is no cyanosis.      Coloration: Skin is not pale.      Findings: No rash.   Neurological:      Mental Status: Alert and oriented to person, place, and time.      Cranial Nerves: No cranial nerve deficit.   Psychiatric:         Judgment: Judgment normal.         Lab Review:       Assessment:      Diagnosis Plan   1. Acute HFrEF (heart failure with reduced ejection fraction)        2. Longstanding persistent atrial fibrillation        3. Coronary artery disease involving native coronary artery of native heart without angina pectoris        4. Essential hypertension        5. Nonrheumatic aortic valve insufficiency        6. MAXINE on CPAP          1. Acute systolic congestive heart failure.    Is euvolemic today, maintain current medications.  2. Aortic valve regurgitation.  Moderate by DENG.    3. Cardiomyopathy.  EF reportedly 20-25% by TTE but appears better around 40-45% by DENG.    4. Coronary artery disease.  Stable non-obstructive disease by cath on 5/1/23.  Denies angina.    5. Hypertension.  Still mildly elevated-increase carvedilol to 12.5 mg twice daily and move losartan to 25 mg nightly.  6. Permanent atrial fibrillation.  He remains in atrial fibrillation, rate controlled-is on Eliquis 5 mg daily.    7. Rheumatoid arthritis.  On methotrexate once weekly, and sulfasalazine.  8. Fatigue and weight loss, see PCP  9. Probable basal cell carcinoma on the right side of his face, needs to see dermatology.         Plan:       See [facility] in back in 3 months, increase carvedilol to 12.5 twice daily and stop aspirin.    I am concerned about his weight loss-may need a bigger work-up for this.

## 2023-07-25 ENCOUNTER — TELEPHONE (OUTPATIENT)
Dept: CARDIOLOGY | Facility: CLINIC | Age: 83
End: 2023-07-25
Payer: MEDICARE

## 2023-07-25 NOTE — TELEPHONE ENCOUNTER
Patient is having EDNC ON 08.31.23 AND MOHS PROCEDURE 09.19.23 with [facility], Dr. Basilio.  He is needing cardiac clearance to hold his Eliquis.  Please advise.    NOV-09/08/23-MM  LOV-06/02/23-    Plan:       See [facility] in back in 3 months, increase carvedilol to 12.5 twice daily and stop aspirin.     I am concerned about his weight loss-may need a bigger work-up for this    .Acute systolic congestive heart failure.    Is euvolemic today, maintain current medications.  Aortic valve regurgitation.  Moderate by DENG.    Cardiomyopathy.  EF reportedly 20-25% by TTE but appears better around 40-45% by DENG.    Coronary artery disease.  Stable non-obstructive disease by cath on 5/1/23.  Denies angina.    Hypertension.  Still mildly elevated-increase carvedilol to 12.5 mg twice daily and move losartan to 25 mg nightly.  Permanent atrial fibrillation.  He remains in atrial fibrillation, rate controlled-is on Eliquis 5 mg daily.    Rheumatoid arthritis.  On methotrexate once weekly, and sulfasalazine.  Fatigue and weight loss, see PCP  Probable basal cell carcinoma on the right side of his face, needs to see dermatology.

## 2023-07-25 NOTE — TELEPHONE ENCOUNTER
He is okay to have his Mohs procedure done.  He is okay to hold his Eliquis 2 days prior and restart it when okay per surgeon.

## 2023-07-25 NOTE — TELEPHONE ENCOUNTER
Caller: Yisel Corado    Relationship: Emergency Contact    Best call back number:[phone]     What form or medical record are you requesting: CARDIAC CLEARANCE    Who is requesting this form or medical record from you: [facility] DR. FARZANA ALMODOVAR    How would you like to receive the form or medical records (pick-up, mail, fax): FAX  If fax, what is the fax number: [phone]  If mail, what is the address:   If pick-up, provide patient with address and location details    Timeframe paperwork needed: AS SOON AS POSSIBLE     Additional notes:PATIENT IS SCHEDULED FOR 2 PROCEDURES. AN EDNC ON 08.31.23 AND MOHS PROCEDURE 09.19.23. PATIENT STATED THEY HAVE ASKED THAT HE STOP HIS ELIQUIS MEDICATION FOR THE PROCEDURES AND PATIENT WANTS DIRECTION FROM DR. PLUNKETT. PLEASE CONTACT PATIENT TO ADVISE. THANK YOU.

## 2023-07-26 NOTE — TELEPHONE ENCOUNTER
Called and spoke with the patient wife.  She stated that the surgeon is wanting him to hold Eliquis 5 days before removing the spot on his leg and 10 days before for the one on his face.  Will that be ok?  Please advise.    Karen

## 2023-10-04 RX ORDER — CARVEDILOL 6.25 MG/1
TABLET ORAL
Qty: 180 TABLET | Refills: 0 | Status: SHIPPED | OUTPATIENT
Start: 2023-10-04

## 2023-10-16 RX ORDER — FUROSEMIDE 20 MG/1
20 TABLET ORAL DAILY
Qty: 30 TABLET | Refills: 0 | Status: SHIPPED | OUTPATIENT
Start: 2023-10-16

## 2023-10-23 RX ORDER — SPIRONOLACTONE 25 MG/1
12.5 TABLET ORAL DAILY
Qty: 45 TABLET | Refills: 0 | Status: SHIPPED | OUTPATIENT
Start: 2023-10-23

## 2023-10-30 RX ORDER — ATORVASTATIN CALCIUM 20 MG/1
20 TABLET, FILM COATED ORAL NIGHTLY
Qty: 90 TABLET | Refills: 0 | Status: SHIPPED | OUTPATIENT
Start: 2023-10-30

## 2023-10-30 RX ORDER — APIXABAN 5 MG/1
TABLET, FILM COATED ORAL
Qty: 180 TABLET | Refills: 0 | Status: SHIPPED | OUTPATIENT
Start: 2023-10-30

## 2023-11-06 RX ORDER — FUROSEMIDE 20 MG/1
20 TABLET ORAL DAILY
Qty: 30 TABLET | Refills: 1 | Status: SHIPPED | OUTPATIENT
Start: 2023-11-06

## 2024-01-10 RX ORDER — CARVEDILOL 6.25 MG/1
TABLET ORAL
Qty: 180 TABLET | Refills: 0 | Status: SHIPPED | OUTPATIENT
Start: 2024-01-10

## 2024-01-29 RX ORDER — ATORVASTATIN CALCIUM 20 MG/1
20 TABLET, FILM COATED ORAL NIGHTLY
Qty: 90 TABLET | Refills: 0 | Status: SHIPPED | OUTPATIENT
Start: 2024-01-29

## 2024-01-29 RX ORDER — APIXABAN 5 MG/1
TABLET, FILM COATED ORAL
Qty: 180 TABLET | Refills: 0 | Status: SHIPPED | OUTPATIENT
Start: 2024-01-29

## 2024-01-29 NOTE — TELEPHONE ENCOUNTER
NOV-02/02/24-MM  LOV-06/02/23-RM    Plan:       See [facility] in back in 3 months, increase carvedilol to 12.5 twice daily and stop aspirin.

## 2024-02-02 ENCOUNTER — OFFICE VISIT (OUTPATIENT)
Dept: CARDIOLOGY | Facility: CLINIC | Age: 84
End: 2024-02-02
Payer: MEDICARE

## 2024-02-02 VITALS
BODY MASS INDEX: 22.65 KG/M2 | DIASTOLIC BLOOD PRESSURE: 88 MMHG | SYSTOLIC BLOOD PRESSURE: 150 MMHG | HEART RATE: 78 BPM | WEIGHT: 167.2 LBS | HEIGHT: 72 IN

## 2024-02-02 DIAGNOSIS — I10 ESSENTIAL HYPERTENSION: ICD-10-CM

## 2024-02-02 DIAGNOSIS — I35.1 NONRHEUMATIC AORTIC VALVE INSUFFICIENCY: ICD-10-CM

## 2024-02-02 DIAGNOSIS — I50.22 CHRONIC HFREF (HEART FAILURE WITH REDUCED EJECTION FRACTION): ICD-10-CM

## 2024-02-02 DIAGNOSIS — E78.2 MIXED HYPERLIPIDEMIA: ICD-10-CM

## 2024-02-02 DIAGNOSIS — M06.9 RHEUMATOID ARTHRITIS, INVOLVING UNSPECIFIED SITE, UNSPECIFIED WHETHER RHEUMATOID FACTOR PRESENT: ICD-10-CM

## 2024-02-02 DIAGNOSIS — I50.32 CHRONIC HEART FAILURE WITH PRESERVED EJECTION FRACTION (HFPEF): ICD-10-CM

## 2024-02-02 DIAGNOSIS — I50.20 HFREF (HEART FAILURE WITH REDUCED EJECTION FRACTION): Primary | ICD-10-CM

## 2024-02-02 DIAGNOSIS — G47.33 OSA ON CPAP: ICD-10-CM

## 2024-02-02 DIAGNOSIS — I25.10 CORONARY ARTERY DISEASE INVOLVING NATIVE CORONARY ARTERY OF NATIVE HEART WITHOUT ANGINA PECTORIS: ICD-10-CM

## 2024-02-02 DIAGNOSIS — I48.11 LONGSTANDING PERSISTENT ATRIAL FIBRILLATION: ICD-10-CM

## 2024-02-02 RX ORDER — CARVEDILOL 12.5 MG/1
12.5 TABLET ORAL 2 TIMES DAILY WITH MEALS
Qty: 180 TABLET | Refills: 3 | Status: SHIPPED | OUTPATIENT
Start: 2024-02-02

## 2024-02-02 RX ORDER — LINACLOTIDE 145 UG/1
1 CAPSULE, GELATIN COATED ORAL DAILY
COMMUNITY
Start: 2024-01-10

## 2024-02-02 RX ORDER — LOSARTAN POTASSIUM 100 MG/1
100 TABLET ORAL DAILY
Start: 2024-02-02 | End: 2024-02-02

## 2024-02-02 RX ORDER — SPIRONOLACTONE 25 MG/1
25 TABLET ORAL 2 TIMES DAILY
Start: 2024-02-02

## 2024-02-02 NOTE — PROGRESS NOTES
"      [facility] CARDIOLOGY  [address]  [address]  [place] [address]  Phone: [phone]  Fax: [phone]  Patient Name: Nito Corado  :1940  Age: 83 y.o.  Primary Cardiologist: Zunilda Ballesteros MD  Encounter Provider:  MYESHA Griffin    History of Present Illness     Nito Corado is a 83 y.o.  male whose medical history includes hypertension, MAXINE/CPAP, prostate issues (Dr. Pantoja), rheumatoid arthritis.  He is followed in our office by Dr. Ballesetros for HFrEF, aortic valve insufficiency, CAD, and aortic valve insufficiency. I have reviewed the past medical records in preparation of today's visit.     24 Follow-up:  He is here for 6-month follow-up and I am seeing him for the first time today.  At last visit, his carvedilol was increased to 12.5 mg twice daily.  His aspirin was stopped. His PCP, Dr. Weaver, has increased his losartan and spironolactone; BP at other offices is similar to today's reading. His weight is down 23 lbs since his Apri 2023 hospitalization.  He is also had a terrible case of shingles and feels that he is finally starting to get over it.  He has poor appetite and constipation.  He occasionally feels lightheaded or dizzy but does not ever feel that he is going to fall or pass out.  He was having terrible joint pain due to rheumatoid arthritis but is now seeing a rheumatologist in [facility] and this is better.  He denies chest pain, shortness of breath, leg swelling, palpitations.  He is taking his medications as prescribed.      [id number] SCORE   [id number] Score: 5 (2024  3:13 PM)       Data Review     The following data was reviewed by MYESHA Griffin on 24:    Vital Signs:   /88 (BP Location: Left arm)   Pulse 78   Ht 182.9 cm (72\")   Wt 75.8 kg (167 lb 3.2 oz)   BMI 22.68 kg/m²       Weight:  Wt Readings from Last 3 Encounters:   24 75.8 kg (167 lb " 3.2 oz)   06/02/23 84.4 kg (186 lb)   05/15/23 85.3 kg (188 lb)     Body mass index is 22.68 kg/m².    Below is a summary of pertinent cardiology findings:  He has history of A-fib and is anticoagulated with apixaban.  April 2023 he presented to the [facility] emergency room for chest pain and was ruled out for ACS.  He did have evidence of volume overload and elevated proBNP.  He was diuresed there.  Echocardiogram showed EF 45 to 50% with evidence of severe aortic valve regurgitation.  He was transferred to [facility] and received more IV diuresis.  A limited echocardiogram showed severely depressed LV systolic function of EF 20 to 25% and only mild aortic valve regurgitation.  April 2023 DENG showed mildly depressed LV SF with EF 40 to 45%, severely dilated left atrium, moderate aortic valve regurgitation.  April 2023 right and left heart catheterization showed normal LV filling pressures, stable moderate nonobstructive coronary artery disease.    Labs:  04/28/2023:  cr 0.9, K 3.1, otherwise unremarkable CMP, TSH 4.900, free T4 1.33, Chol 159, HDL 33, , Trig 111, Hgb 13.1, Plt 207      ECG 12 Lead    Date/Time: 2/2/2024 3:12 PM  Performed by: Hoa Castrejon APRN    Authorized by: Hoa Castrejon APRN  Comparison: compared with previous ECG from 6/2/2023  Similar to previous ECG  Rhythm: atrial fibrillation  Rate: normal  BPM: 78  Conduction: right bundle branch block  Other findings: left ventricular hypertrophy          Medications     Allergies as of 02/02/2024 - Reviewed 06/02/2023   Allergen Reaction Noted    [employer] Cough 04/27/2023       Current Outpatient Medications   Medication Instructions    atorvastatin (LIPITOR) 20 mg, Oral, Nightly    carvedilol (COREG) 12.5 mg, Oral, 2 Times Daily With Meals    Eliquis 5 MG tablet tablet Take 1 tablet by mouth twice daily    folic acid (FOLVITE) 1 mg, Oral, Daily    Linzess 145 MCG capsule capsule 1  capsule, Oral, Daily    methotrexate 20 mg, Oral, Weekly, SATURDAY    omeprazole (PRILOSEC) 20 mg, Daily    sacubitril-valsartan (ENTRESTO) 24-26 MG tablet 1 tablet, Oral, 2 Times Daily    spironolactone (ALDACTONE) 25 mg, Oral, 2 Times Daily    sulfaSALAzine (AZULFIDINE) 1,000 mg, Oral, 2 Times Daily        Past History, Review of Systems, Exam     Past Medical History:   Diagnosis Date    Allergic rhinitis     Atrial fibrillation     Benign hypertension     History of bleeding ulcers     Marginal perforation of tympanic membrane     Otorrhea     PAF (paroxysmal atrial fibrillation)        Past Surgical History:   has a past surgical history that includes Knee surgery; External ear surgery (Left); Cardiac catheterization (N/A, 2020); Cardiac catheterization (N/A, 2020); Cardiac catheterization (N/A, 2020); Cardiac catheterization (2020); Cardiac catheterization (N/A, 2023); and Cardiac catheterization (N/A, 2023).     Social History     Socioeconomic History    Marital status:     Number of children: 1   Tobacco Use    Smoking status: Former     Types: Cigarettes     Quit date: 1970     Years since quittin.1     Passive exposure: Past    Smokeless tobacco: Former    Tobacco comments:     Caffiene daily    Vaping Use    Vaping Use: Never used   Substance and Sexual Activity    Alcohol use: Yes     Alcohol/week: 1.0 standard drink of alcohol     Types: 1 Shots of liquor per week    Drug use: No    Sexual activity: Defer       Review of Systems   Constitutional: Positive for decreased appetite, malaise/fatigue and weight loss.   Cardiovascular: Negative.        Vitals reviewed.   Constitutional:       Appearance: Not in distress.   Eyes:      Conjunctiva/sclera: Conjunctivae normal.      Pupils: Pupils are equal, round, and reactive to light.   HENT:      Head: Normocephalic.      Nose: Nose normal.    Mouth/Throat:      Pharynx: Oropharynx is clear.   Neck:      Vascular:  JVD normal.   Pulmonary:      Effort: Pulmonary effort is normal.      Breath sounds: Normal breath sounds. Examination of the right-lower field reveals decreased breath sounds. Examination of the left-lower field reveals decreased breath sounds. No wheezing. No rhonchi. No rales.   Cardiovascular:      Normal rate. Irregularly irregular rhythm. Normal S1. Normal S2.       Murmurs: There is a grade 2/6 systolic murmur.   Pulses:     Intact distal pulses.   Edema:     Peripheral edema absent.   Abdominal:      General: Bowel sounds are normal. There is no distension.      Palpations: Abdomen is soft.      Tenderness: There is no abdominal tenderness.   Musculoskeletal: Normal range of motion.      Cervical back: Normal range of motion and neck supple. Skin:     General: Skin is warm and dry.   Neurological:      Mental Status: Alert and oriented to person, place and time.   Psychiatric:         Attention and Perception: Attention normal.         Mood and Affect: Mood normal.         Speech: Speech normal.         Behavior: Behavior is cooperative.          Assessment and Plan     Assessment:  1. HFrEF (heart failure with reduced ejection fraction)    2. Coronary artery disease involving native coronary artery of native heart without angina pectoris    3. Nonrheumatic aortic valve insufficiency    4. Longstanding persistent atrial fibrillation    5. Essential hypertension    6. Mixed hyperlipidemia    7. MAXINE on CPAP    8. Rheumatoid arthritis, involving unspecified site, unspecified whether rheumatoid factor present    9. Chronic heart failure with preserved ejection fraction (HFpEF)    10. Chronic HFrEF (heart failure with reduced ejection fraction)         HFrEF: He was diagnosed with acute HFrEF in April 2023 when he presented with volume overload; initial EF was 45 to 50% with evidence of severe aortic valve regurgitation.  He had repeat echocardiogram upon transfer to [facility] which showed EF 20  to 25% and only mild aortic valve regurgitation.  DENG showed EF 45 to 50% and moderate aortic valve regurgitation.  Cardiac catheterization showed stable moderate nonobstructive coronary artery disease and normal LV filling pressures.  His current medical therapy includes carvedilol, losartan, and spironolactone.  He is euvolemic but he is orthostatic.  Coronary artery disease: April 2023 left heart catheterization showed stable moderate, nonobstructive coronary artery disease.  His medical therapy includes 20 mg atorvastatin, 12.5 mg carvedilol, and 5 mg apixaban twice daily.  He denies chest pain.  Aortic valve insufficiency: This was noted to be moderate on April 2023 DENG.  Faint murmur heard on exam today.  Chronic A-fib: He is in rate controlled A-fib today on 12.5 mg carvedilol twice daily.  His CHADS2 vascular score is 5 and he is anticoagulated with 5 mg apixaban twice daily.  Hypertension: Recheck shows BP to be 150/80 sitting but was very faint when he was standing; I think his SBP was closer to 120.  He is orthostatic.  Hyperlipidemia: He is on 20 mg atorvastatin daily and lipids were elevated when checked in April 2023.  No repeat since hospitalization.  MAXINE: He no longer is requires CPAP following weight loss.  Rheumatoid arthritis: He is now on methotrexate and sulfasalazine, and follows with rheumatologist in [facility].    Mr. Corado is a patient of Dr. Ballesteros's that was diagnosed with new HFrEF in April 2023, moderate aortic valve regurgitation, and moderate, nonobstructive coronary artery disease.  I do believe that he is orthostatic in office today.  I am going to reduce his spironolactone to 25 mg once daily.  I am also going to stop his losartan and start him on 24-26 mg Entresto twice daily.  I have asked he and his wife to check his BP at home.  His goal while sitting should be 150/80.  I have asked him to call if it is higher.    I am also going to recheck echocardiogram and see  him back in 4 weeks.    Return in about 4 weeks (around 3/1/2024) for Echo and follow up, MYESHA Turpin.  Orders Placed This Encounter   Procedures    ECG 12 Lead    Adult Transthoracic Echo Complete W/ Cont if Necessary Per Protocol      New Medications Ordered This Visit   Medications    spironolactone (ALDACTONE) 25 MG tablet     Sig: Take 1 tablet by mouth 2 (Two) Times a Day.    carvedilol (COREG) 12.5 MG tablet     Sig: Take 1 tablet by mouth 2 (Two) Times a Day With Meals.     Dispense:  180 tablet     Refill:  3    sacubitril-valsartan (ENTRESTO) 24-26 MG tablet     Sig: Take 1 tablet by mouth 2 (Two) Times a Day.     Dispense:  60 tablet     Refill:  0         Thank you for the opportunity to participate in this patient's care.    MYESHA Turpin    This office note has been dictated.

## 2024-02-22 NOTE — TELEPHONE ENCOUNTER
"Subjective     JOZEF Reynaga \"Tanya\" is a pleasant 56 y.o. female s/p left breast Magseed localized partial mastectomy, left axillary SLNB with dual tracer and Magseed localization on 24 for left breast invasive ductal carcinoma, grade 3, ER + >95%, WY + 10%, HER2 negative, [id number],  clinical stage IIA. Final pathology showed invasive ductal carcinoma and DCIS, grade 3, 3 cm, negative margins, 1/4 lymph nodes with macrometastasis (10 mm with extranodal extension), ER + >95%, WY + 10%, HER2 negative, pT2N1a, clinical stage IIA.      BREAST IMAGIN2023 Bilateral diagnostic mammogram with bilateral ultrasound, indicates BI-RADS Category 4. Suspicious left breast mass with axillary lymphadenopathy. Ultrasound-guided biopsy of the left breast mass at 1:00 and the axillary lymph node labelled #1 is recommended. Additional normal-appearing lymph nodes visualized.      On 23 Left breast, ultrasound-guided core biopsy showed invasive ductal carcinoma, grade 3. Left lymph node showed metastatic carcinoma. ER + >95%, WY + 10%, HER2 pending,    She underwent genetic testing which was negative for deleterious mutations.      She presents for postoperative visit. She recovered well from surgery. She is returning to all her normal activities. She notes erythema, swelling and irritation of her axillary incision since yesterday associated with tightness.     REPRODUCTIVE HISTORY:  menarche age 14, , first birth age 30, did not breastfeed, OCP's over 20 years, natural menopause age 54, no HRT, scattered fibroglandular tissue     FAMILY CANCER HISTORY:   Maternal Aunt: Breast cancer, age 40  Maternal Great Aunt: Breast cancer, age 45  Paternal Grandmother: Colon cancer, age 65  Father: Prostate cancer, age 58 and skin cancer, age 60  Review of Systems     Current Outpatient Medications on File Prior to Visit   Medication Sig Dispense Refill    montelukast (Singulair) 10 mg tablet TAKE ONE TABLET BY MOUTH " Order placed.  thanks   EVERY DAY 90 tablet 0    pantoprazole (ProtoNix) 40 mg EC tablet TAKE ONE TABLET BY MOUTH EVERY DAY 30 tablet 3    traMADol (Ultram) 50 mg tablet Take 1 tablet (50 mg) by mouth every 6 hours if needed for severe pain (7 - 10). 15 tablet 0     No current facility-administered medications on file prior to visit.     Past Medical History:   Diagnosis Date    Personal history of urinary calculi 09/25/2014    Personal history of renal calculi     Review of Systems  14-point ROS otherwise negative, except as above.    Objective   Physical Exam  General: Otherwise healthy appearing. No acute distress.     HEENT: Conjunctiva well-colored, sclera non-icteric. Neck supple, trachea midline. No lymphadenopathy or thyromegaly.     Cardiovascular: Regular rate and rhythm.    Respiratory: Clear to auscultation bilaterally.     Breast: Exam performed in the sitting and supine positions. Left breast: 1:00, 8 cm FN, well healing curvilinear incision. Well healing left axillary incision with fullness and surrounding erythema. Aspiration performed and sent for culture.     Abdomen: Soft, non-tender, non-distended.    Extremities: Atraumatic, no edema.     Neurologic: Motor and sensory grossly intact. Alert and oriented.     Lymphatic: No axillary, supraclavicular, or infraclavicular lymphadenopathy bilaterally.     Procedure note- left axillary seroma aspiration:  Verbal consent was obtained. The skin was sterilely prepared with chlorhexidine solution.  2 cc of 1% lidocaine were infiltrated subcutaneously. One pass was made with an 18-gauge needle. 42 cc of purulent fluid were aspirated and discarded. The patient experienced immediate symptomatic relief. A sterile dressing was applied. Home going and wound care instructions were provided. Fluid sent for culture.     Assessment/Plan     You are recovering well from surgery and your incisions are healing well. There is an infected fluid collection at your axillary incision called an  abscess. This was removed with aspiration and can be treated with antibiotics.     Instructions:   1. Please continue to use Tylenol and/or ice for discomfort.   2. Bactrim DS twice daily x 10 days.    We discussed your pathology report. This showed invasive ductal cancer, ER+/NJ+/ HER2- , clear margins and 1 of 4 lymph nodes with cancer cells. Your care was discussed at multidisciplinary breast cancer conference. I explained the recommendations for radiation therapy and antiestrogen medication. Oncotype was sent to help determine the need for chemotherapy.     All questions were answered in detail, and we are in agreement with the following plan:   Please keep your postoperative visit next week.   2.  Referral to medical oncology.   3.  Referral to radiation oncology.   4. Please return in August 2024 for clinical exam with Yuliana Diamond in breast cancer survivorship. You will be due for bilateral mammogram December 2024.     If you need assistance with scheduling medical oncology or radiation oncology appointments, please call [phone] option #2. If you have any questions, concerns, or changes in your breast self-exam prior to our next visit, please call my office at the number below. Our breast care team looks forward to seeing you again soon.       Raegan Herrera MD   Breast Surgical Oncology   Department of Surgery [facility]   Office: [phone] (option #2)   Fax: [phone]     **DISCLAIMER** Speech recognition software was used to create portions of this document. While an attempt at proofreading has been made, minor errors in transcription may be present.    Diagnoses and all orders for this visit:  Abscess  Cellulitis of left axilla  Malignant neoplasm of upper-outer quadrant of left breast in female, estrogen receptor positive (CMS/HCC)  -     Referral to Hematology and Oncology; Future  -     Referral to Radiation Oncology; Future  -     Tissue/Wound  Culture/Smear  Status post partial mastectomy of left breast  Seroma of breast  Primary hypertension  Estrogen receptor positive status (ER+)  Family history of malignant neoplasm of breast      Scribe Attestation  By signing my name below, I, Urszula Rivas, attest that this documentation has been prepared under the direction and in the presence of Raegan Herrera MD.

## 2024-03-08 ENCOUNTER — HOSPITAL ENCOUNTER (OUTPATIENT)
Dept: CARDIOLOGY | Facility: HOSPITAL | Age: 84
Discharge: HOME | End: 2024-03-08
Payer: MEDICARE

## 2024-03-08 ENCOUNTER — OFFICE VISIT (OUTPATIENT)
Dept: CARDIOLOGY | Facility: CLINIC | Age: 84
End: 2024-03-08
Payer: MEDICARE

## 2024-03-08 VITALS
SYSTOLIC BLOOD PRESSURE: 180 MMHG | BODY MASS INDEX: 22.4 KG/M2 | HEIGHT: 72 IN | WEIGHT: 165.34 LBS | DIASTOLIC BLOOD PRESSURE: 100 MMHG | HEART RATE: 60 BPM

## 2024-03-08 VITALS
SYSTOLIC BLOOD PRESSURE: 155 MMHG | DIASTOLIC BLOOD PRESSURE: 80 MMHG | BODY MASS INDEX: 23.84 KG/M2 | HEIGHT: 72 IN | HEART RATE: 84 BPM | WEIGHT: 176 LBS

## 2024-03-08 DIAGNOSIS — I50.20 HFREF (HEART FAILURE WITH REDUCED EJECTION FRACTION): Primary | ICD-10-CM

## 2024-03-08 DIAGNOSIS — E78.2 MIXED HYPERLIPIDEMIA: ICD-10-CM

## 2024-03-08 DIAGNOSIS — I25.10 CORONARY ARTERY DISEASE INVOLVING NATIVE CORONARY ARTERY OF NATIVE HEART WITHOUT ANGINA PECTORIS: ICD-10-CM

## 2024-03-08 DIAGNOSIS — I50.20 HFREF (HEART FAILURE WITH REDUCED EJECTION FRACTION): ICD-10-CM

## 2024-03-08 DIAGNOSIS — I10 ESSENTIAL HYPERTENSION: ICD-10-CM

## 2024-03-08 DIAGNOSIS — I50.22 CHRONIC HFREF (HEART FAILURE WITH REDUCED EJECTION FRACTION): ICD-10-CM

## 2024-03-08 DIAGNOSIS — I35.1 NONRHEUMATIC AORTIC VALVE INSUFFICIENCY: ICD-10-CM

## 2024-03-08 DIAGNOSIS — I48.11 LONGSTANDING PERSISTENT ATRIAL FIBRILLATION: ICD-10-CM

## 2024-03-08 LAB
AORTIC DIMENSIONLESS INDEX: 0.8 (DI)
BH CV ECHO LEFT VENTRICLE GLOBAL LONGITUDINAL STRAIN: -14.5 %
BH CV ECHO MEAS - AI P1/2T: 500.2 MSEC
BH CV ECHO MEAS - AO MAX PG: 15.7 MMHG
BH CV ECHO MEAS - AO MEAN PG: 9 MMHG
BH CV ECHO MEAS - AO V2 MAX: 198 CM/SEC
BH CV ECHO MEAS - AO V2 VTI: 32.6 CM
BH CV ECHO MEAS - AVA(I,D): 2.8 CM2
BH CV ECHO MEAS - EDV(CUBED): 148.9 ML
BH CV ECHO MEAS - EDV(MOD-SP2): 89 ML
BH CV ECHO MEAS - EDV(MOD-SP4): 161 ML
BH CV ECHO MEAS - EF(MOD-BP): 31.7 %
BH CV ECHO MEAS - EF(MOD-SP2): 24.7 %
BH CV ECHO MEAS - EF(MOD-SP4): 33.5 %
BH CV ECHO MEAS - ESV(CUBED): 67.2 ML
BH CV ECHO MEAS - ESV(MOD-SP2): 67 ML
BH CV ECHO MEAS - ESV(MOD-SP4): 107 ML
BH CV ECHO MEAS - FS: 23.3 %
BH CV ECHO MEAS - IVS/LVPW: 1.3 CM
BH CV ECHO MEAS - IVSD: 1.3 CM
BH CV ECHO MEAS - LV DIASTOLIC VOL/BSA (35-75): 82.2 CM2
BH CV ECHO MEAS - LV MASS(C)D: 242 GRAMS
BH CV ECHO MEAS - LV MAX PG: 8.2 MMHG
BH CV ECHO MEAS - LV MEAN PG: 4 MMHG
BH CV ECHO MEAS - LV SYSTOLIC VOL/BSA (12-30): 54.6 CM2
BH CV ECHO MEAS - LV V1 MAX: 143 CM/SEC
BH CV ECHO MEAS - LV V1 VTI: 23.9 CM
BH CV ECHO MEAS - LVIDD: 5.3 CM
BH CV ECHO MEAS - LVIDS: 4.1 CM
BH CV ECHO MEAS - LVOT AREA: 3.8 CM2
BH CV ECHO MEAS - LVOT DIAM: 2.19 CM
BH CV ECHO MEAS - LVPWD: 1 CM
BH CV ECHO MEAS - MV A MAX VEL: 65.5 CM/SEC
BH CV ECHO MEAS - MV DEC TIME: 0.17 SEC
BH CV ECHO MEAS - MV E MAX VEL: 111 CM/SEC
BH CV ECHO MEAS - MV E/A: 1.69
BH CV ECHO MEAS - PA ACC TIME: 0.08 SEC
BH CV ECHO MEAS - PA V2 MAX: 105 CM/SEC
BH CV ECHO MEAS - QP/QS: 1
BH CV ECHO MEAS - RAP SYSTOLE: 3 MMHG
BH CV ECHO MEAS - RV MAX PG: 1.38 MMHG
BH CV ECHO MEAS - RV V1 MAX: 58.7 CM/SEC
BH CV ECHO MEAS - RV V1 VTI: 11.3 CM
BH CV ECHO MEAS - RVOT DIAM: 3.2 CM
BH CV ECHO MEAS - RVSP: 43 MMHG
BH CV ECHO MEAS - SI(MOD-SP2): 11.2 ML/M2
BH CV ECHO MEAS - SI(MOD-SP4): 27.6 ML/M2
BH CV ECHO MEAS - SV(LVOT): 90 ML
BH CV ECHO MEAS - SV(MOD-SP2): 22 ML
BH CV ECHO MEAS - SV(MOD-SP4): 54 ML
BH CV ECHO MEAS - SV(RVOT): 90.4 ML
BH CV ECHO MEAS - TR MAX PG: 40.3 MMHG
BH CV ECHO MEAS - TR MAX VEL: 317.5 CM/SEC
BH CV XLRA - RV BASE: 3.7 CM
BH CV XLRA - RV LENGTH: 8.2 CM
BH CV XLRA - RV MID: 2.28 CM
BH CV XLRA - TDI S': 14.5 CM/SEC
LEFT ATRIUM VOLUME INDEX: 51.5 ML/M2
SINUS: 3.2 CM

## 2024-03-08 PROCEDURE — 93306 TTE W/DOPPLER COMPLETE: CPT

## 2024-03-08 PROCEDURE — 93356 MYOCRD STRAIN IMG SPCKL TRCK: CPT

## 2024-03-08 RX ORDER — SPIRONOLACTONE 25 MG/1
25 TABLET ORAL DAILY
Start: 2024-03-08

## 2024-03-08 NOTE — PROGRESS NOTES
"      [facility] CARDIOLOGY  [address]  [address]  [place] [address]  Phone: [phone]  Fax: [phone]  Patient Name: Nito Corado  :1940  Age: 83 y.o.  Primary Cardiologist: Zunilda Ballesteros MD  Encounter Provider:  MYESHA Griffin    History of Present Illness     Nito Corado is a 83 y.o.  male whose medical history includes hypertension, MAXINE/CPAP, prostate issues (Dr. Pantoja), rheumatoid arthritis.  He is followed in our office by Dr. Ballesteros for HFrEF, aortic valve insufficiency, CAD, and aortic valve insufficiency. I have reviewed the past medical records in preparation of today's visit.     24 Follow-up:  He is here for 6-week follow-up.  At last visit, I reduced his spironolactone to 25 mg daily, stopped losartan, and started 24-26 mg Entresto twice daily.  He is tolerating this change and his blood pressure at home is usually running 140s.  He still feels unsteady or unbalanced if he stands up too quickly.  He is walking with a walker today but his wife is noticed that he has started to walk more with a cane.  He is not having chest pain and he does not feel his heart racing or skipping beats.  He has no leg swelling.  His breathing is comfortable and he denies orthopnea.  He is taking his medications as prescribed.  His appetite is improving.      [id number] SCORE   [id number] Score: 5 (3/8/2024  2:29 PM)         Data Review     The following data was reviewed by MYESHA Griffin on 24:    Vital Signs:   /80   Pulse 84   Ht 182.9 cm (72\")   Wt 79.8 kg (176 lb)   BMI 23.87 kg/m²       Weight:  Wt Readings from Last 3 Encounters:   24 79.8 kg (176 lb)   24 75 kg (165 lb 5.5 oz)   24 75.8 kg (167 lb 3.2 oz)     Body mass index is 23.87 kg/m².    Below is a summary of pertinent cardiology findings:  He has history of A-fib and is anticoagulated with apixaban.  2023 " he presented to the [facility] emergency room for chest pain and was ruled out for ACS.  He did have evidence of volume overload and elevated proBNP.  He was diuresed there.  Echocardiogram showed EF 45 to 50% with evidence of severe aortic valve regurgitation.  He was transferred to [facility] and received more IV diuresis.  A limited echocardiogram showed severely depressed LV systolic function of EF 20 to 25% and only mild aortic valve regurgitation.  April 2023 DENG showed mildly depressed LV SF with EF 40 to 45%, severely dilated left atrium, moderate aortic valve regurgitation.  April 2023 right and left heart catheterization showed normal LV filling pressures, stable moderate nonobstructive coronary artery disease.    Labs:  04/28/2023:  cr 0.9, K 3.1, otherwise unremarkable CMP, TSH 4.900, free T4 1.33, Chol 159, HDL 33, , Trig 111, Hgb 13.1, Plt 207  05/02/2023:  cr 1.2, K 3.8, otherwise unremarkable BMP  01/30/2024:  cr 0.7, K 4.5, otherwise unremarkable CMP      ECG 12 Lead    Date/Time: 3/8/2024 2:32 PM  Performed by: Hoa Castrejon APRN    Authorized by: Hoa Castrejon APRN  Comparison: compared with previous ECG from 2/2/2024  Similar to previous ECG  Rhythm: atrial fibrillation  Rate: normal  BPM: 84  Conduction: right bundle branch block  T inversion: aVL, V1 and V2  Other findings: left ventricular hypertrophy          Medications     Allergies as of 03/08/2024 - Reviewed 03/08/2024   Allergen Reaction Noted    American sycamore Cough 04/27/2023       Current Outpatient Medications   Medication Instructions    apixaban (ELIQUIS) 5 mg, Oral, 2 Times Daily    atorvastatin (LIPITOR) 20 mg, Oral, Nightly    carvedilol (COREG) 12.5 mg, Oral, 2 Times Daily With Meals    folic acid (FOLVITE) 1 mg, Oral, Daily    Linzess 145 MCG capsule capsule 1 capsule, Oral, Daily    methotrexate 20 mg, Oral, Weekly, SATURDAY    omeprazole (PRILOSEC) 20 mg, Daily     sacubitril-valsartan (ENTRESTO) 24-26 MG tablet 1 tablet, Oral, 2 Times Daily    spironolactone (ALDACTONE) 25 mg, Oral, Daily    sulfaSALAzine (AZULFIDINE) 1,000 mg, Oral, 2 Times Daily        Past History, Review of Systems, Exam     Past Medical History:   Diagnosis Date    Allergic rhinitis     Atrial fibrillation     Benign hypertension     History of bleeding ulcers     Marginal perforation of tympanic membrane     Otorrhea     PAF (paroxysmal atrial fibrillation)        Past Surgical History:   has a past surgical history that includes Knee surgery; External ear surgery (Left); Cardiac catheterization (N/A, 2020); Cardiac catheterization (N/A, 2020); Cardiac catheterization (N/A, 2020); Cardiac catheterization (2020); Cardiac catheterization (N/A, 2023); and Cardiac catheterization (N/A, 2023).     Social History     Socioeconomic History    Marital status:     Number of children: 1   Tobacco Use    Smoking status: Former     Current packs/day: 0.00     Types: Cigarettes     Quit date: 1970     Years since quittin.2     Passive exposure: Past    Smokeless tobacco: Former    Tobacco comments:     Caffiene daily    Vaping Use    Vaping status: Never Used   Substance and Sexual Activity    Alcohol use: Not Currently     Alcohol/week: 1.0 standard drink of alcohol     Types: 1 Shots of liquor per week    Drug use: No    Sexual activity: Defer       Review of Systems   Constitutional: Positive for malaise/fatigue and weight loss.   Cardiovascular: Negative.    Neurological:  Positive for loss of balance.       Vitals reviewed.   Constitutional:       Appearance: Not in distress.   Eyes:      Conjunctiva/sclera: Conjunctivae normal.      Pupils: Pupils are equal, round, and reactive to light.   HENT:      Head: Normocephalic.      Nose: Nose normal.    Mouth/Throat:      Pharynx: Oropharynx is clear.   Neck:      Vascular: JVD normal.   Pulmonary:      Effort: Pulmonary  effort is normal.      Breath sounds: Normal breath sounds. No wheezing. No rhonchi. No rales.   Cardiovascular:      Normal rate. Irregularly irregular rhythm. Normal S1. Normal S2.       Murmurs: There is a grade 2/6 systolic murmur.   Pulses:     Intact distal pulses.   Edema:     Peripheral edema absent.   Abdominal:      General: Bowel sounds are normal. There is no distension.      Palpations: Abdomen is soft.      Tenderness: There is no abdominal tenderness.   Musculoskeletal: Normal range of motion.      Cervical back: Normal range of motion and neck supple. Skin:     General: Skin is warm and dry.   Neurological:      Mental Status: Alert and oriented to person, place and time.   Psychiatric:         Attention and Perception: Attention normal.         Mood and Affect: Mood normal.         Speech: Speech normal.         Behavior: Behavior is cooperative.          Assessment and Plan     Assessment:  1. HFrEF (heart failure with reduced ejection fraction)    2. Coronary artery disease involving native coronary artery of native heart without angina pectoris    3. Nonrheumatic aortic valve insufficiency    4. Longstanding persistent atrial fibrillation    5. Essential hypertension    6. Mixed hyperlipidemia           HFrEF: He was diagnosed with acute HFrEF in April 2023 when he presented with volume overload; initial EF was 45 to 50% with evidence of severe aortic valve regurgitation.  He had repeat echocardiogram upon transfer to [facility] which showed EF 20 to 25% and only mild aortic valve regurgitation.  DENG showed EF 45 to 50% and moderate aortic valve regurgitation.  Cardiac catheterization showed stable moderate nonobstructive coronary artery disease and normal LV filling pressures.  March 2024 echocardiogram shows EF 36 to 40% with severe hypokinesis of the inferior and inferolateral walls; he also has grade 2 with high LAP diastolic dysfunction.  RVSP is 43 mmHg.  His current medical  therapy includes carvedilol, Entresto, and spironolactone.  He is euvolemic; his orthostasis have improved.  Coronary artery disease: April 2023 left heart catheterization showed stable moderate, nonobstructive coronary artery disease.  His medical therapy includes 20 mg atorvastatin, 12.5 mg carvedilol, 24-26 mg Entresto, and 5 mg apixaban twice daily.  He denies chest pain.  Aortic valve insufficiency: This was noted to be moderate on April 2023 DENG.  This was noted to be moderate to severe on echocardiogram today; there was an eccentric jet directed towards the anterior mitral valve leaflet which may underestimate the severity.  Chronic A-fib: He remains in rate controlled A-fib today on 12.5 mg carvedilol twice daily.  His CHADS2 vascular score is 5 and he is anticoagulated with 5 mg apixaban twice daily.  Hypertension: He still describes some orthostatic symptoms.  Hyperlipidemia: He is on 20 mg atorvastatin daily and lipids were elevated when checked in April 2023.  Will follow.  MAXINE: He no longer is requires CPAP following weight loss.  Rheumatoid arthritis: He is now on methotrexate and sulfasalazine, and follows with rheumatologist in [facility].    Mr. Corado is a patient of Dr. Ballesteros's that was diagnosed with new HFrEF in April 2023, moderate aortic valve regurgitation, and moderate, nonobstructive coronary artery disease.  Echo today shows EF to be 36 to 40% which is stable.  There is still concern for moderate to severe aortic valve insufficiency based on echo today; I will have Dr. Ballesteros review this upon her return.    I am not can make any medication changes today.  He may need a bit more diuresis based on echo results.    I will see him in 2 months and have him see Dr. Ballesteros in 6 months.    Return for May 17 with Celena; 6 months with Dr. Ballesteros.  Orders Placed This Encounter   Procedures    ECG 12 Lead      New Medications Ordered This Visit   Medications    spironolactone  (ALDACTONE) 25 MG tablet     Sig: Take 1 tablet by mouth Daily.    sacubitril-valsartan (ENTRESTO) 24-26 MG tablet     Sig: Take 1 tablet by mouth 2 (Two) Times a Day.     Dispense:  180 tablet     Refill:  3     Please call patient with cost of medication.    apixaban (Eliquis) 5 MG tablet tablet     Sig: Take 1 tablet by mouth 2 (Two) Times a Day.     Dispense:  180 tablet     Refill:  3     Please call patient with cost of medication.         Thank you for the opportunity to participate in this patient's care.    MYESHA Turpin    This office note has been dictated.

## 2024-04-29 RX ORDER — ATORVASTATIN CALCIUM 20 MG/1
20 TABLET, FILM COATED ORAL NIGHTLY
Qty: 90 TABLET | Refills: 0 | Status: SHIPPED | OUTPATIENT
Start: 2024-04-29

## 2024-07-24 RX ORDER — ATORVASTATIN CALCIUM 20 MG/1
20 TABLET, FILM COATED ORAL NIGHTLY
Qty: 90 TABLET | Refills: 3 | Status: SHIPPED | OUTPATIENT
Start: 2024-07-24

## 2024-07-24 NOTE — TELEPHONE ENCOUNTER
Failed protocol    NOV-09/13/24-RM  LOV-03/08/24-MM    Mr. Corado is a patient of Dr. Ballesteros's that was diagnosed with new HFrEF in April 2023, moderate aortic valve regurgitation, and moderate, nonobstructive coronary artery disease.  Echo today shows EF to be 36 to 40% which is stable.  There is still concern for moderate to severe aortic valve insufficiency based on echo today; I will have Dr. Ballesteros review this upon her return.     I am not making any medication changes today.  He may need a bit more diuresis based on echo results.     I will see him in 2 months and have him see Dr. Ballesteros in 6 months.     03/12/24 I discussed with Dr. Ballesteros; no diuretic as he is asymptomatic.

## 2024-09-13 ENCOUNTER — OFFICE VISIT (OUTPATIENT)
Dept: CARDIOLOGY | Facility: CLINIC | Age: 84
End: 2024-09-13
Payer: MEDICARE

## 2024-09-13 VITALS
DIASTOLIC BLOOD PRESSURE: 80 MMHG | WEIGHT: 161 LBS | BODY MASS INDEX: 21.81 KG/M2 | HEIGHT: 72 IN | SYSTOLIC BLOOD PRESSURE: 180 MMHG | HEART RATE: 74 BPM

## 2024-09-13 DIAGNOSIS — I50.20 HFREF (HEART FAILURE WITH REDUCED EJECTION FRACTION): ICD-10-CM

## 2024-09-13 DIAGNOSIS — I10 ESSENTIAL HYPERTENSION: ICD-10-CM

## 2024-09-13 DIAGNOSIS — I35.1 NONRHEUMATIC AORTIC VALVE INSUFFICIENCY: ICD-10-CM

## 2024-09-13 DIAGNOSIS — E78.2 MIXED HYPERLIPIDEMIA: ICD-10-CM

## 2024-09-13 DIAGNOSIS — I48.11 LONGSTANDING PERSISTENT ATRIAL FIBRILLATION: ICD-10-CM

## 2024-09-13 DIAGNOSIS — I50.22 HEART FAILURE WITH MILDLY REDUCED EJECTION FRACTION (HFMREF): ICD-10-CM

## 2024-09-13 DIAGNOSIS — I25.10 CORONARY ARTERY DISEASE INVOLVING NATIVE CORONARY ARTERY OF NATIVE HEART WITHOUT ANGINA PECTORIS: Primary | ICD-10-CM

## 2024-09-13 DIAGNOSIS — G47.33 OSA ON CPAP: ICD-10-CM

## 2024-09-13 NOTE — PROGRESS NOTES
Date of Office Visit: 2024  Encounter Provider: Zunilda Ballesteros MD  Place of Service: [facility] CARDIOLOGY  Patient Name: Nito Corado  :1940      Patient ID:  Nito Corado is a 83 y.o. male is here for  followup for HFmrEF, aortic insufficiency        History of Present Illness    He has a history of chronic atrial fibrillation, hypertension, hyperlipidemia, MAXINE, nonobstructive coronary artery disease, rheumatoid arthritis, chronic HFpEF.  He sees Dr. Pantoja for prostate issues.    He was at [facility] emergency room on 2023 where his work-up showed 2 normal troponins and unremarkable EKG.  However he did have evidence of volume overload by elevated BNP, chest x-ray and by exam.  He was admitted and diuresed there.  An echocardiogram was repeated showing his LV function had depressed to 45 to 50% and there was evidence of severe aortic valve regurgitation.  He was transferred to [facility].  He did appear to be volume overloaded.  He was continued on IV diuresis.  We were unable to open the echocardiogram from [facility] so a repeat limited echocardiogram was performed 2023.  This indicated severely depressed left ventricular systolic function with an EF of 20 to 25% and only mild aortic valve regurgitation.  Based on the decline in his LV function and the discrepancy in the aortic valve regurgitation a right and left cardiac catheterization and transesophageal echocardiogram were recommended.  He was discharged 2023.     Transesophageal echocardiogram was performed on 2023.  This showed mildly depressed left ventricular systolic function with an EF of 40 to 45%, severely dilated left atrium, and moderate aortic valve regurgitation.  His right and left cardiac catheterization showed normal left ventricular filling pressures and stable moderate nonobstructive coronary artery disease.  Medical management  was continued.  He was noted to have issues with nocturnal hypoxia.  He had a diagnosis of sleep apnea but had not been using his CPAP due to COVID.    He has no family history of heart disease.  He is , has children, is retired, quit smoking 1970, has daily caffeine and no drugs.    Echocardiogram done 3/8/2024 showed ejection fraction 36-40% with severe hypokinesis of the inferior and inferolateral walls, mild concentric left ventricular hypertrophy, grade 2 diastolic dysfunction, severe left atrial enlargement, RVSP 43 mmHg, moderate to severe aortic insufficiency-eccentric jet.    He has no chest pain or pressure.  He does not feels heart racing or skipping.  He has had no dizziness, syncope or falls.  He has no lower extremity edema abdominal distention.  His appetite is somewhat poor.  He has no orthopnea or PND.  His blood pressure at his PCP office is 150/80.    Past Medical History:   Diagnosis Date    Allergic rhinitis     Atrial fibrillation     Benign hypertension     History of bleeding ulcers     Marginal perforation of tympanic membrane     Otorrhea     PAF (paroxysmal atrial fibrillation)          Past Surgical History:   Procedure Laterality Date    CARDIAC CATHETERIZATION N/A 9/21/2020    Procedure: Coronary angiography;  Surgeon: Nita Kumar MD;  Location: [facility] CATH INVASIVE LOCATION;  Service: Cardiovascular;  Laterality: N/A;    CARDIAC CATHETERIZATION N/A 9/21/2020    Procedure: Left heart cath;  Surgeon: Nita Kumar MD;  Location: [facility] CATH INVASIVE LOCATION;  Service: Cardiovascular;  Laterality: N/A;    CARDIAC CATHETERIZATION N/A 9/21/2020    Procedure: Left ventriculography;  Surgeon: Nita Kumar MD;  Location: [facility] CATH INVASIVE LOCATION;  Service: Cardiovascular;  Laterality: N/A;    CARDIAC CATHETERIZATION  9/21/2020    Procedure: Functional Flow Sebastian;  Surgeon: Nita Kumar MD;  Location: [facility] CATH INVASIVE LOCATION;  Service: Cardiovascular;;     CARDIAC CATHETERIZATION N/A 2023    Procedure: Right and Left Heart Cath;  Surgeon: Nita Kumar MD;  Location:  [facility] CATH INVASIVE LOCATION;  Service: Cardiovascular;  Laterality: N/A;    CARDIAC CATHETERIZATION N/A 2023    Procedure: Coronary angiography;  Surgeon: Nita Kumar MD;  Location:  [facility] CATH INVASIVE LOCATION;  Service: Cardiovascular;  Laterality: N/A;    EXTERNAL EAR SURGERY Left     KNEE SURGERY         Current Outpatient Medications on File Prior to Visit   Medication Sig Dispense Refill    apixaban (Eliquis) 5 MG tablet tablet Take 1 tablet by mouth 2 (Two) Times a Day. 180 tablet 3    atorvastatin (LIPITOR) 20 MG tablet TAKE 1 TABLET BY MOUTH ONCE DAILY AT NIGHT 90 tablet 3    carvedilol (COREG) 12.5 MG tablet Take 1 tablet by mouth 2 (Two) Times a Day With Meals. 180 tablet 3    folic acid (FOLVITE) 1 MG tablet Take 1 tablet by mouth Daily.      Linzess 145 MCG capsule capsule Take 1 capsule by mouth Daily.      methotrexate 2.5 MG tablet Take 8 tablets by mouth 1 (One) Time Per Week. SATURDAY      omeprazole (priLOSEC) 20 MG capsule 1 capsule As Needed.      sacubitril-valsartan (ENTRESTO) 24-26 MG tablet Take 1 tablet by mouth 2 (Two) Times a Day. 180 tablet 3    spironolactone (ALDACTONE) 25 MG tablet Take 1 tablet by mouth Daily.      sulfaSALAzine (AZULFIDINE) 500 MG tablet Take 3 tablets by mouth 2 (Two) Times a Day.       No current facility-administered medications on file prior to visit.       Social History     Socioeconomic History    Marital status:     Number of children: 1   Tobacco Use    Smoking status: Former     Current packs/day: 0.00     Types: Cigarettes     Quit date: 1970     Years since quittin.8     Passive exposure: Past    Smokeless tobacco: Former    Tobacco comments:     Caffiene daily    Vaping Use    Vaping status: Never Used   Substance and Sexual Activity    Alcohol use: Not Currently     Alcohol/week: 1.0 standard drink of  "alcohol     Types: 1 Shots of liquor per week    Drug use: No    Sexual activity: Defer             Procedures    ECG 12 Lead    Date/Time: 9/13/2024 1:46 PM  Performed by: Zunilda Ballesteros MD    Authorized by: Zunilda Ballesteros MD  Comparison: compared with previous ECG   Similar to previous ECG  Rhythm: atrial fibrillation  Conduction: right bundle branch block and left anterior fascicular block    Clinical impression: abnormal EKG              Objective:      Vitals:    09/13/24 1311   BP: 180/80   Pulse: 74   Weight: 73 kg (161 lb)   Height: 182.9 cm (72\")     Body mass index is 21.84 kg/m².    Vitals reviewed.   Constitutional:       General: Not in acute distress.     Appearance: Not diaphoretic.   Neck:      Vascular: No carotid bruit or JVD.   Pulmonary:      Effort: Pulmonary effort is normal.      Breath sounds: Normal breath sounds.   Cardiovascular:      Normal rate. Irregularly irregular rhythm.      Murmurs: There is a grade 2/4 high frequency blowing decrescendo, early diastolic murmur at the URSB, radiating to the apex.      No gallop.  No rub.   Pulses:     Intact distal pulses.      Carotid: 2+ bilaterally.     Radial: 2+ bilaterally.     Dorsalis pedis: 2+ bilaterally.     Posterior tibial: 2+ bilaterally.  Edema:     Peripheral edema absent.   Neurological:      Cranial Nerves: No cranial nerve deficit.         Lab Review:       Assessment:      Diagnosis Plan   1. Coronary artery disease involving native coronary artery of native heart without angina pectoris        2. HFrEF (heart failure with reduced ejection fraction)        3. Nonrheumatic aortic valve insufficiency        4. Essential hypertension        5. Longstanding persistent atrial fibrillation        6. MAXINE on CPAP        7. Mixed hyperlipidemia          Chronic HFmrEF - is euvolemic today, maintain current medications.  Aortic valve regurgitation.  Moderate by DENG.    Cardiomyopathy.  EF reportedly 20-25% by TTE but appears " better around 40-45% by DENG.    Coronary artery disease.  Stable non-obstructive disease by cath on 5/1/23.  Denies angina.    Hypertension.  Still elevated.  Remain on carvedilol, change Entresto to 1 tablet of 24/26 in the morning and 2 tablets of 24/26 in the evening.  Permanent atrial fibrillation.  Rate controlled and on Eliquis 5 mg daily.    Rheumatoid arthritis.  On methotrexate once weekly, and sulfasalazine.  Fatigue and weight loss, see PCP.  Maintaining weight.  Probable basal cell carcinoma on the right side of his face, needs to see dermatology.  HOLLIE, FRANDY     Plan:       See Roseanne in 3 months, no other testing at this time.  Repeat blood pressure today was 170/80.

## 2024-09-18 ENCOUNTER — TELEPHONE (OUTPATIENT)
Dept: CARDIOLOGY | Facility: CLINIC | Age: 84
End: 2024-09-18
Payer: MEDICARE

## 2025-01-20 RX ORDER — CARVEDILOL 12.5 MG/1
12.5 TABLET ORAL 2 TIMES DAILY WITH MEALS
Qty: 180 TABLET | Refills: 1 | Status: SHIPPED | OUTPATIENT
Start: 2025-01-20

## 2025-01-20 NOTE — TELEPHONE ENCOUNTER
NOV:02/03/2025  LOV:09/13/2024      Chronic HFmrEF - is euvolemic today, maintain current medications.  Aortic valve regurgitation.  Moderate by DENG.    Cardiomyopathy.  EF reportedly 20-25% by TTE but appears better around 40-45% by DENG.    Coronary artery disease.  Stable non-obstructive disease by cath on 5/1/23.  Denies angina.    Hypertension.  Still elevated.  Remain on carvedilol, change Entresto to 1 tablet of 24/26 in the morning and 2 tablets of 24/26 in the evening.  Permanent atrial fibrillation.  Rate controlled and on Eliquis 5 mg daily.    Rheumatoid arthritis.  On methotrexate once weekly, and sulfasalazine.  Fatigue and weight loss, see PCP.  Maintaining weight.  Probable basal cell carcinoma on the right side of his face, needs to see dermatology.  HOLLIE, FRANDY     Plan:       See Roseanne in 3 months, no other testing at this time.  Repeat blood pressure today was 170/80.    THANKS  Celena MCDERMOTT

## 2025-02-03 ENCOUNTER — OFFICE VISIT (OUTPATIENT)
Dept: CARDIOLOGY | Facility: CLINIC | Age: 85
End: 2025-02-03
Payer: MEDICARE

## 2025-02-03 DIAGNOSIS — I35.1 NONRHEUMATIC AORTIC VALVE INSUFFICIENCY: ICD-10-CM

## 2025-02-03 DIAGNOSIS — I48.11 LONGSTANDING PERSISTENT ATRIAL FIBRILLATION: ICD-10-CM

## 2025-02-03 DIAGNOSIS — E78.2 MIXED HYPERLIPIDEMIA: ICD-10-CM

## 2025-02-03 DIAGNOSIS — I50.9 CHRONIC CONGESTIVE HEART FAILURE, UNSPECIFIED HEART FAILURE TYPE: Primary | ICD-10-CM

## 2025-02-03 DIAGNOSIS — I10 ESSENTIAL HYPERTENSION: ICD-10-CM

## 2025-02-03 NOTE — PROGRESS NOTES
Date of Office Visit: 2025  Encounter Provider: MYESHA Flowers  Place of Service: [facility] CARDIOLOGY  Established cardiologist: Zunilda Ballesteros MD  Patient Name: Nito Corado  :1940      Patient ID:  Nito Corado is a 84 y.o. male is here for  followup    With a pertinent medical history of chronic atrial fibrillation, hypertension, hyperlipidemia, MAXINE, nonobstructive coronary artery disease, rheumatoid arthritis, chronic HFpEF.  He sees Dr. Pantoja for prostate issues.   He has no family history of heart disease. He is , has children, is retired, quit smoking , has daily caffeine and no drugs.     History of Present Illness  Seen at [facility] ED in 2023 with acute HF.  He was admitted and diuresed.  Echocardiogram there showed ejection fraction 45 to 50%, severe aortic valve regurgitation.  He was transferred to [facility] in [place].  A repeat limited echocardiogram was done which showed an ejection fraction 20 to 25% and only mild aortic regurgitation.  Due to LV systolic function decline and discrepancy in aortic valve insufficiency a right and left cardiac catheterization and DENG were recommended.    DENG 2023 showed EF 40 to 45%, severely dilated left atrium, and moderate aortic regurgitation.  Right and left cardiac catheterization showed normal LV filling pressures and stable moderate nonobstructive CAD.    Echocardiogram done 3/8/2024 showed ejection fraction 36-40% with severe hypokinesis of the inferior and inferolateral walls, mild concentric left ventricular hypertrophy, grade 2 diastolic dysfunction, severe left atrial enlargement, RVSP 43 mmHg, moderate to severe aortic insufficiency-eccentric jet.     He saw Dr. Ballesteros in the office 2024.  His blood pressure was elevated and Entresto was increased.    Today Nito is here with his wife.  Overall he has felt well his main concern today is early  satiety.  He has had some weight loss over the past year from this.  He does have an appetite but once he eats feels quite full after only taking a few bites.  He has some chronic dizziness he feels is stable and related to vertigo.  He denies any exertional difficulties or complaints.  He has tolerated the increased dose of Entresto well he thinks.  His blood pressures better today. There is no chest pain or pressure, soa, pretty, pnd, presyncope/syncope, leg swelling, heart racing, or palpitations.     Current Outpatient Medications on File Prior to Visit   Medication Sig Dispense Refill    apixaban (Eliquis) 5 MG tablet tablet Take 1 tablet by mouth 2 (Two) Times a Day. 180 tablet 3    atorvastatin (LIPITOR) 20 MG tablet TAKE 1 TABLET BY MOUTH ONCE DAILY AT NIGHT 90 tablet 3    carvedilol (COREG) 12.5 MG tablet Take 1 tablet by mouth 2 (Two) Times a Day With Meals. 180 tablet 1    folic acid (FOLVITE) 1 MG tablet Take 1 tablet by mouth Daily.      Linzess 145 MCG capsule capsule Take 1 capsule by mouth Daily.      methotrexate 2.5 MG tablet Take 8 tablets by mouth 1 (One) Time Per Week. SATURDAY      omeprazole (priLOSEC) 20 MG capsule 1 capsule As Needed.      sacubitril-valsartan (ENTRESTO) 24-26 MG tablet 1 tab in am and 2 at night 270 tablet 3    spironolactone (ALDACTONE) 25 MG tablet Take 1 tablet by mouth Daily.      sulfaSALAzine (AZULFIDINE) 500 MG tablet Take 3 tablets by mouth 2 (Two) Times a Day.       No current facility-administered medications on file prior to visit.         Procedures    ECG 12 Lead    Date/Time: 2/5/2025 8:30 AM  Performed by: Roseanne Armenta APRN    Authorized by: Roseanne Armenta APRN  Comparison: compared with previous ECG from 9/13/2024  Rhythm: atrial fibrillation  BPM: 77  Conduction: right bundle branch block and left anterior fascicular block              Objective:      Vitals:    02/03/25 1409 02/05/25 0832   BP: 154/84 125/75   Pulse: 71    SpO2: 96%    Weight:  "71.9 kg (158 lb 9.6 oz)    Height: 182.9 cm (72\")      Body mass index is 21.51 kg/m².  Wt Readings from Last 3 Encounters:   02/03/25 71.9 kg (158 lb 9.6 oz)   09/13/24 73 kg (161 lb)   03/08/24 75 kg (165 lb 5.5 oz)         Constitutional:       Comments: Nito is an 84-year-old male who is slender, somewhat frail, otherwise well-appearing in no acute distress today   Eyes:      Pupils: Pupils are equal, round, and reactive to light.   Pulmonary:      Effort: Pulmonary effort is normal.      Breath sounds: Normal breath sounds.   Cardiovascular:      Normal rate. Irregular rhythm.      Murmurs: There is a grade 2/6 systolic murmur.   Pulses:     Radial: 2+ bilaterally.     Dorsalis pedis: 2+ bilaterally.     Posterior tibial: 2+ bilaterally.  Edema:     Peripheral edema absent.   Abdominal:      General: Bowel sounds are normal.      Palpations: Abdomen is soft.   Musculoskeletal:      Cervical back: Normal range of motion. Skin:     General: Skin is warm and dry.   Neurological:      Mental Status: Alert and oriented to person, place and time.   Psychiatric:         Speech: Speech normal.         Lab Review:      Lab Results   Component Value Date    TSH 4.900 (H) 04/28/2023       Lab Results   Component Value Date    CHOL 159 04/28/2023     Lab Results   Component Value Date    TRIG 111 04/28/2023     Lab Results   Component Value Date    HDL 33 (L) 04/28/2023     Lab Results   Component Value Date     (H) 04/28/2023       Lab Results   Component Value Date    WBC 5.90 05/01/2023    HGB 14.3 05/01/2023    HCT 42 05/01/2023    MCV 90.2 05/01/2023     05/01/2023       Lab Results   Component Value Date    GLUCOSE 98 05/02/2023    BUN 35 (H) 05/02/2023    CREATININE 1.18 05/02/2023    EGFR 62.4 05/02/2023    BCR 29.7 (H) 05/02/2023    K 3.8 05/02/2023    CO2 25.0 05/02/2023    CALCIUM 8.7 05/02/2023    ALBUMIN 3.4 (L) 04/28/2023    BILITOT 0.5 04/28/2023    AST 9 04/28/2023    ALT 7 04/28/2023 " "      No results found for: \"HGBA1C\"    Assessment:     1. Chronic congestive heart failure, unspecified heart failure type    2. Longstanding persistent atrial fibrillation    3. Essential hypertension    4. Nonrheumatic aortic valve insufficiency    5. Mixed hyperlipidemia    Chronic HFrEF = 36-40% on TTE 3/24.  He is euvolemic today.  Continue present regimen.  His GDMT includes ARNI, MRA, beta-blocker  Coronary artery disease, nonobstructive by catheterization May 2023.  He has no angina.  Persistent atrial fibrillation.  Rate controlled he is on carvedilol and anticoagulated on apixaban.  Hypertension, goal is less than 120/80 and his blood pressure is much better.   Moderate aortic valve regurgitation on DENG 5/2023 and noted moderate to severe on TTE 3/2024.   Hyperlipidemia on atorvastatin  RBBB, LAFB  Rheumatoid arthritis on methotrexate once weekly and sulfasalazine  He has had chronic issues with fatigue and weight loss now experiencing early satiety.  To continue follow-up with primary care      Plan:   No medication changes were made  Blood pressure is much better and he is stable from a cardiac standpoint today  To review his early satiety and continue follow-up for his chronic fatigue and weight loss with primary care Dr. Weaver he is scheduled to see her next month.   He will keep his appointment with Dr. Ballesteros August 2025 as scheduled    Thank you for allowing me to participate in this patient's care. Please call with any questions or concerns.          Dragon dictation device was utilized in this note.   "

## 2025-02-05 VITALS
HEIGHT: 72 IN | SYSTOLIC BLOOD PRESSURE: 125 MMHG | DIASTOLIC BLOOD PRESSURE: 75 MMHG | HEART RATE: 71 BPM | BODY MASS INDEX: 21.48 KG/M2 | OXYGEN SATURATION: 96 % | WEIGHT: 158.6 LBS

## 2025-03-04 ENCOUNTER — TELEPHONE (OUTPATIENT)
Dept: CARDIOLOGY | Facility: CLINIC | Age: 85
End: 2025-03-04
Payer: MEDICARE

## 2025-03-04 NOTE — TELEPHONE ENCOUNTER
Patient is requesting new prescription of Entresto be sent to the [facility] in [place].

## 2025-03-04 NOTE — TELEPHONE ENCOUNTER
Patient is requesting new prescription of Entresto be sent to the [facility] in [place].        NOV-08/22/25-RM  LOV-02/03/25-EE    Chronic HFrEF = 36-40% on TTE 3/24.  He is euvolemic today.  Continue present regimen.  His GDMT includes ARNI, MRA, beta-blocker  Coronary artery disease, nonobstructive by catheterization May 2023.  He has no angina.  Persistent atrial fibrillation.  Rate controlled he is on carvedilol and anticoagulated on apixaban.  Hypertension, goal is less than 120/80 and his blood pressure is much better.   Moderate aortic valve regurgitation on DENG 5/2023 and noted moderate to severe on TTE 3/2024.   Hyperlipidemia on atorvastatin  RBBB, LAFB  Rheumatoid arthritis on methotrexate once weekly and sulfasalazine  He has had chronic issues with fatigue and weight loss now experiencing early satiety.  To continue follow-up with primary care        Plan:   No medication changes were made  Blood pressure is much better and he is stable from a cardiac standpoint today  To review his early satiety and continue follow-up for his chronic fatigue and weight loss with primary care Dr. Weaver he is scheduled to see her next month.   He will keep his appointment with Dr. Ballesteros August 2025 as scheduled

## 2025-03-05 RX ORDER — SACUBITRIL AND VALSARTAN 24; 26 MG/1; MG/1
TABLET, FILM COATED ORAL
Qty: 270 TABLET | Refills: 3 | Status: SHIPPED | OUTPATIENT
Start: 2025-03-05

## 2025-07-11 RX ORDER — ATORVASTATIN CALCIUM 20 MG/1
20 TABLET, FILM COATED ORAL NIGHTLY
Qty: 90 TABLET | Refills: 3 | Status: SHIPPED | OUTPATIENT
Start: 2025-07-11

## 2025-07-11 RX ORDER — CARVEDILOL 12.5 MG/1
12.5 TABLET ORAL 2 TIMES DAILY WITH MEALS
Qty: 180 TABLET | Refills: 3 | Status: SHIPPED | OUTPATIENT
Start: 2025-07-11

## 2025-07-11 NOTE — TELEPHONE ENCOUNTER
NOV-08/22/25-RM  LOV-02/03/25-EE    Chronic HFrEF = 36-40% on TTE 3/24.  He is euvolemic today.  Continue present regimen.  His GDMT includes ARNI, MRA, beta-blocker  Coronary artery disease, nonobstructive by catheterization May 2023.  He has no angina.  Persistent atrial fibrillation.  Rate controlled he is on carvedilol and anticoagulated on apixaban.  Hypertension, goal is less than 120/80 and his blood pressure is much better.   Moderate aortic valve regurgitation on DENG 5/2023 and noted moderate to severe on TTE 3/2024.   Hyperlipidemia on atorvastatin  RBBB, LAFB  Rheumatoid arthritis on methotrexate once weekly and sulfasalazine  He has had chronic issues with fatigue and weight loss now experiencing early satiety.  To continue follow-up with primary care        Plan:   No medication changes were made  Blood pressure is much better and he is stable from a cardiac standpoint today  To review his early satiety and continue follow-up for his chronic fatigue and weight loss with primary care Dr. Weaver he is scheduled to see her next month.   He will keep his appointment with Dr. Ballesteros August 2025 as scheduled

## 2025-07-11 NOTE — TELEPHONE ENCOUNTER
Failed protocol    NOV-08/22/25-RM  LOV-02/03/25-EE    Chronic HFrEF = 36-40% on TTE 3/24.  He is euvolemic today.  Continue present regimen.  His GDMT includes ARNI, MRA, beta-blocker  Coronary artery disease, nonobstructive by catheterization May 2023.  He has no angina.  Persistent atrial fibrillation.  Rate controlled he is on carvedilol and anticoagulated on apixaban.  Hypertension, goal is less than 120/80 and his blood pressure is much better.   Moderate aortic valve regurgitation on DENG 5/2023 and noted moderate to severe on TTE 3/2024.   Hyperlipidemia on atorvastatin  RBBB, LAFB  Rheumatoid arthritis on methotrexate once weekly and sulfasalazine  He has had chronic issues with fatigue and weight loss now experiencing early satiety.  To continue follow-up with primary care        Plan:   No medication changes were made  Blood pressure is much better and he is stable from a cardiac standpoint today  To review his early satiety and continue follow-up for his chronic fatigue and weight loss with primary care Dr. Weaver he is scheduled to see her next month.   He will keep his appointment with Dr. Ballesteros August 2025 as scheduled

## 2025-08-20 RX ORDER — APIXABAN 5 MG/1
5 TABLET, FILM COATED ORAL 2 TIMES DAILY
Qty: 180 TABLET | Refills: 0 | Status: SHIPPED | OUTPATIENT
Start: 2025-08-20

## 2025-08-22 ENCOUNTER — OFFICE VISIT (OUTPATIENT)
Dept: CARDIOLOGY | Facility: CLINIC | Age: 85
End: 2025-08-22
Payer: MEDICARE

## 2025-08-22 VITALS
HEART RATE: 75 BPM | BODY MASS INDEX: 21.54 KG/M2 | HEIGHT: 72 IN | DIASTOLIC BLOOD PRESSURE: 80 MMHG | SYSTOLIC BLOOD PRESSURE: 150 MMHG | WEIGHT: 159 LBS

## 2025-08-22 DIAGNOSIS — I48.20 ATRIAL FIBRILLATION, CHRONIC: Primary | ICD-10-CM

## 2025-08-22 DIAGNOSIS — E78.2 MIXED HYPERLIPIDEMIA: ICD-10-CM

## 2025-08-22 DIAGNOSIS — I35.1 NONRHEUMATIC AORTIC VALVE INSUFFICIENCY: ICD-10-CM

## 2025-08-22 DIAGNOSIS — G47.33 OSA ON CPAP: ICD-10-CM

## 2025-08-22 DIAGNOSIS — I50.20 HFREF (HEART FAILURE WITH REDUCED EJECTION FRACTION): ICD-10-CM

## 2025-08-22 DIAGNOSIS — I10 ESSENTIAL HYPERTENSION: ICD-10-CM

## (undated) DEVICE — GW EMR FIX EXCHG J STD .035 3MM 260CM

## (undated) DEVICE — KT MANIFLD CARDIAC

## (undated) DEVICE — PK CATH CARD 40

## (undated) DEVICE — GLIDESHEATH BASIC HYDROPHILIC COATED INTRODUCER SHEATH: Brand: GLIDESHEATH

## (undated) DEVICE — CATH DIAG IMPULSE FL3.5 5F 100CM

## (undated) DEVICE — CATH VENT MIV RADL PIG ST TIP 5F 110CM

## (undated) DEVICE — GLIDESHEATH SLENDER STAINLESS STEEL KIT: Brand: GLIDESHEATH SLENDER

## (undated) DEVICE — HI-TORQUE BALANCE MIDDLEWEIGHT GUIDE WIRE .014 STRAIGHT TIP 3.0 CM X 190 CM: Brand: HI-TORQUE BALANCE MIDDLEWEIGHT

## (undated) DEVICE — CATH DIAG IMPULSE FR4 5F 100CM

## (undated) DEVICE — GW PRESSUREWIRE AERIS W/ AGILE TP 175CM

## (undated) DEVICE — BALN PRESS WEDGE 5F 110CM

## (undated) DEVICE — BND PRESS RADL COMFRT 14IN STRL

## (undated) DEVICE — 6F .070 XB 3 100CM: Brand: VISTA BRITE TIP